# Patient Record
Sex: MALE | Race: WHITE | NOT HISPANIC OR LATINO | URBAN - NONMETROPOLITAN AREA
[De-identification: names, ages, dates, MRNs, and addresses within clinical notes are randomized per-mention and may not be internally consistent; named-entity substitution may affect disease eponyms.]

---

## 2017-10-12 NOTE — PATIENT DISCUSSION
(H40.013) Open angle with borderline findings, low risk, bilateral - Assesment : Examination revealed suspicion for Open Angle Glaucoma OU. Based on Increased C/D Iop within normal range - Plan : Monitor for changes. Advised patient to call our office when decreased vision or increased eye pain.

## 2017-10-12 NOTE — PATIENT DISCUSSION
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 Hard drusen OU - Plan : Patient advised to check [employer] regularly (once weekly or more) and use nutraceuticals such as AREDS 2 eye vitamins. Wear sunglasses when outdoors and eat green, leafy vegetables to maintain ocular health.

## 2017-10-12 NOTE — PATIENT DISCUSSION
(U90.552) Other secondary cataract, bilateral - Assesment : Significant posterior capsule opacification present. Patient is symptomatic with visual function affected. - Plan : Discussed that opacity is the cause of the decreased vision. Discussed the risks and benefits of performing a YAG Capsulotomy including the risk of floaters, retinal detachment, and retinal swelling. Patient understands to expect floaters after the YAG capsulotomy procedures and understands that they may remain indefinitely.  Recommend that patient undergo a YAG Capsulotomy OU (Both Eyes)

## 2017-11-21 NOTE — PATIENT DISCUSSION
(P20.608) Other secondary cataract, bilateral - Assesment : s/p yag,open - Plan : Monitor for changes. Advised patient to call our office with decreased vision or an increase in symptoms.  Rtc 6 months dilation/octm for amd

## 2018-09-05 PROBLEM — H52.4: Noted: 2018-09-05

## 2018-09-05 PROBLEM — H25.13: Noted: 2019-07-12

## 2019-07-12 ENCOUNTER — IMPORTED ENCOUNTER (OUTPATIENT)
Dept: URBAN - NONMETROPOLITAN AREA CLINIC 1 | Facility: CLINIC | Age: 84
End: 2019-07-12

## 2019-07-12 PROCEDURE — 92014 COMPRE OPH EXAM EST PT 1/>: CPT

## 2019-07-12 PROCEDURE — 92015 DETERMINE REFRACTIVE STATE: CPT

## 2019-07-12 NOTE — PATIENT DISCUSSION
Presbyopia OUDiscussed refractive status in detail with patient. MR done today but do not recommend updating due to cataract progression. Continue to monitor. Cheryl OUDiscussed diagnosis with patient. Reviewed symptoms related to cataract progression. Discussed various treatment options with patient. Recommend cataract evaluation by Dr. Melinda Brown. Patient agrees with plan will schedule

## 2019-10-02 ENCOUNTER — IMPORTED ENCOUNTER (OUTPATIENT)
Dept: URBAN - NONMETROPOLITAN AREA CLINIC 1 | Facility: CLINIC | Age: 84
End: 2019-10-02

## 2019-10-02 PROBLEM — H25.13: Noted: 2019-10-02

## 2019-10-02 PROBLEM — H52.4: Noted: 2019-10-02

## 2019-10-02 PROCEDURE — 92014 COMPRE OPH EXAM EST PT 1/>: CPT

## 2019-10-02 NOTE — PATIENT DISCUSSION
Cataract(s)-Visually significant cataract OU .-Cataract(s) causing symptomatic impairment of visual function not correctable with a tolerable change in glasses or contact lenses lighting or non-operative means resulting in specific activity limitations and/or participation restrictions including but not limited to reading viewing television driving or meeting vocational or recreational needs. -Expectation is clearer vision and functional improvement in symptoms as well as reduced glare disability after cataract removal.-Order IOLMaster and OPD today. -Recommend LenSx OU/Toric OD/LRI OS   based on today's OPD testing and lifestyle questionnaire.-All questions were answered regarding surgery including pre and post-op medications appointments activity restrictions and anesthetic usage.-The risks benefits and alternatives and special risk factors for the patient were discussed in detail including but not limited to: bleeding infection retinal detachment vitreous loss problems with the implant and possible need for additional surgery.-Although rare the possibility of complete vision loss was discussed.-The possible need for glasses post-operatively was discussed.-Order medical clearance exam based on history of HBP-Patient elects to proceed with cataract surgery OS . Will schedule at patient's convenience and re-evaluate OD  in the future. **Doctor Recommendations**- Amblyopia OS discussed realistic visual outcome- Discussed Standard/Traditional surgery with patient - Discussed the need for glasses up close - Informed patient his right eye will always see better patient states understanding - Discussed LenSx surgery in detail with patient - Patient qualifies for Toric OD and LRI OS based on today's testing

## 2019-11-01 ENCOUNTER — IMPORTED ENCOUNTER (OUTPATIENT)
Dept: URBAN - NONMETROPOLITAN AREA CLINIC 1 | Facility: CLINIC | Age: 84
End: 2019-11-01

## 2019-11-01 PROBLEM — I10: Noted: 2019-11-01

## 2019-11-01 PROBLEM — E78.5: Noted: 2019-11-01

## 2019-11-01 PROBLEM — I25.2: Noted: 2019-11-01

## 2019-11-01 PROBLEM — Z01.818: Noted: 2019-11-01

## 2019-11-06 ENCOUNTER — IMPORTED ENCOUNTER (OUTPATIENT)
Dept: URBAN - NONMETROPOLITAN AREA CLINIC 1 | Facility: CLINIC | Age: 84
End: 2019-11-06

## 2019-11-06 PROBLEM — H25.11: Noted: 2019-11-06

## 2019-11-06 PROBLEM — Z98.42: Noted: 2019-11-06

## 2019-11-06 PROCEDURE — 99024 POSTOP FOLLOW-UP VISIT: CPT

## 2019-11-06 NOTE — PATIENT DISCUSSION
1 Day POV CE OS 11/5/19 LRI- Discussed diagnosis in detail with patient- Patient is stable and doing well- Wound intact- Continue all post op drops as directed- Continue to monitor- RTC 1 week POV with Dr. Theresa Cloud -Visually significant.-Cataract(s) causing symptomatic impairment of visual function not correctable with a tolerable change in glasses or contact lenses lighting or non-operative means resulting in specific activity limitations and/or participation restrictions including but not limited to reading viewing television driving or meeting vocational or recreational needs. -Expectation is clearer vision and reduced glare disability after cataract removal.-Refer to Dr Shawnee Gonzalez for cataract evaluation

## 2019-11-12 ENCOUNTER — IMPORTED ENCOUNTER (OUTPATIENT)
Dept: URBAN - NONMETROPOLITAN AREA CLINIC 1 | Facility: CLINIC | Age: 84
End: 2019-11-12

## 2019-11-12 PROBLEM — H25.11: Noted: 2019-11-12

## 2019-11-12 PROBLEM — Z98.42: Noted: 2019-11-12

## 2019-11-12 PROCEDURE — 92012 INTRM OPH EXAM EST PATIENT: CPT

## 2019-11-12 PROCEDURE — 99024 POSTOP FOLLOW-UP VISIT: CPT

## 2019-11-12 NOTE — PATIENT DISCUSSION
Cataract(s)-Visually significant cataract OD . -Cataract(s) causing symptomatic impairment of visual function not correctable with a tolerable change in glasses or contact lenses lighting or non-operative means resulting in specific activity limitations and/or participation restrictions including but not limited to reading viewing television driving or meeting vocational or recreational needs. -Expectation is clearer vision and functional improvement in symptoms as well as reduced glare disability after cataract removal.-Recommend Toric IOL based on previous OPD testing and lifestyle questionnaire.-All questions were answered regarding surgery including pre and post-op medications appointments activity restrictions and anesthetic usage.-The risks benefits and alternatives and special risk factors for the patient were discussed in detail including but not limited to: bleeding infection retinal detachment vitreous loss problems with the implant and possible need for additional surgery.-Although rare the possibility of complete vision loss was discussed.-The need for glasses post-operatively was discussed.-Patient elects to proceed with cataract surgery OD . Will schedule at patient's convenience. **Hx of Trauma OD**s/p PC IOL OS-Pt doing well at 1 week s/p PCIOL. -Continue post-op gtts according to instruction sheet.-Okay to resume usual activites and d/c eye shield.

## 2019-11-20 ENCOUNTER — IMPORTED ENCOUNTER (OUTPATIENT)
Dept: URBAN - NONMETROPOLITAN AREA CLINIC 1 | Facility: CLINIC | Age: 84
End: 2019-11-20

## 2019-11-20 PROBLEM — Z98.42: Noted: 2019-11-12

## 2019-11-20 PROBLEM — Z98.41: Noted: 2019-11-20

## 2019-11-20 PROCEDURE — 99024 POSTOP FOLLOW-UP VISIT: CPT

## 2019-11-20 NOTE — PATIENT DISCUSSION
1 day POV s/p CE OD 11/19/2019  - TORIC / CE OS 11/05/2019 - LRI- Discussed diagnosis in detail with patient - Patient is stable and doing well- Wound intact - Continue all post op drops as directed - Continue to montior - RTC A/S POV

## 2019-12-09 ENCOUNTER — IMPORTED ENCOUNTER (OUTPATIENT)
Dept: URBAN - NONMETROPOLITAN AREA CLINIC 1 | Facility: CLINIC | Age: 84
End: 2019-12-09

## 2019-12-09 PROCEDURE — 99024 POSTOP FOLLOW-UP VISIT: CPT

## 2019-12-09 NOTE — PATIENT DISCUSSION
3 week POV s/p CE OD 11/19/2019  - TORIC / CE OS 11/05/2019 - LRI- Discussed diagnosis in detail with patient - Patient is stable and doing well- Wound intact - Finish all post op drops as directed - Continue to [facility] 3 month for follow up with [facility]

## 2020-03-09 ENCOUNTER — IMPORTED ENCOUNTER (OUTPATIENT)
Dept: URBAN - NONMETROPOLITAN AREA CLINIC 1 | Facility: CLINIC | Age: 85
End: 2020-03-09

## 2020-03-09 PROBLEM — H43.811: Noted: 2020-03-09

## 2020-03-09 PROBLEM — Z96.1: Noted: 2020-03-09

## 2020-03-09 PROCEDURE — 92012 INTRM OPH EXAM EST PATIENT: CPT

## 2020-03-09 NOTE — PATIENT DISCUSSION
P/C IOL OUDiscussed diagnosis in detail with patient. Both intraocular implants in place and stable. Continue to monitor. PVD ODDiscussed findings of exam in detail with the patient. The risk of retinal detachment in patients with PVDs was discussed with the patient and the warning signs of retinal detachment were carefully reviewed with the patient. The patient was warned to return to the office or contact the ophthalmologist on call immediately if they experience signs of retinal detachment. Continue to monitor. RTC in 1 year with [facility]

## 2021-05-25 ENCOUNTER — TELEPHONE (OUTPATIENT)
Dept: CARDIOLOGY CLINIC | Age: 86
End: 2021-05-25

## 2021-05-25 NOTE — TELEPHONE ENCOUNTER
Records received from [facility] in [place], [place].

## 2021-06-02 ENCOUNTER — OFFICE VISIT (OUTPATIENT)
Dept: FAMILY MEDICINE CLINIC | Age: 86
End: 2021-06-02
Payer: MEDICARE

## 2021-06-02 VITALS
WEIGHT: 204 LBS | BODY MASS INDEX: 28.56 KG/M2 | OXYGEN SATURATION: 96 % | HEART RATE: 72 BPM | DIASTOLIC BLOOD PRESSURE: 64 MMHG | TEMPERATURE: 98.4 F | SYSTOLIC BLOOD PRESSURE: 114 MMHG | HEIGHT: 71 IN

## 2021-06-02 DIAGNOSIS — C61 PROSTATE CA (HCC): ICD-10-CM

## 2021-06-02 DIAGNOSIS — I25.10 CORONARY ARTERY DISEASE INVOLVING NATIVE CORONARY ARTERY OF NATIVE HEART WITHOUT ANGINA PECTORIS: Primary | ICD-10-CM

## 2021-06-02 PROCEDURE — 99204 OFFICE O/P NEW MOD 45 MIN: CPT | Performed by: STUDENT IN AN ORGANIZED HEALTH CARE EDUCATION/TRAINING PROGRAM

## 2021-06-02 RX ORDER — NITROGLYCERIN 0.4 MG/1
0.4 TABLET SUBLINGUAL
COMMUNITY

## 2021-06-02 RX ORDER — PANTOPRAZOLE SODIUM 40 MG/1
40 TABLET, DELAYED RELEASE ORAL DAILY
COMMUNITY
End: 2021-06-11 | Stop reason: SDUPTHER

## 2021-06-02 RX ORDER — METOPROLOL TARTRATE 100 MG/1
100 TABLET ORAL 2 TIMES DAILY
COMMUNITY
End: 2022-01-12

## 2021-06-02 RX ORDER — LISINOPRIL 10 MG/1
10 TABLET ORAL DAILY
COMMUNITY
End: 2022-03-02 | Stop reason: SDUPTHER

## 2021-06-02 RX ORDER — ASPIRIN 81 MG/1
81 TABLET ORAL EVERY OTHER DAY
COMMUNITY

## 2021-06-02 RX ORDER — ATORVASTATIN CALCIUM 20 MG/1
20 TABLET, FILM COATED ORAL DAILY
COMMUNITY
End: 2021-07-28 | Stop reason: SDUPTHER

## 2021-06-02 RX ORDER — LANOLIN ALCOHOL/MO/W.PET/CERES
400 CREAM (GRAM) TOPICAL DAILY
COMMUNITY

## 2021-06-02 RX ORDER — AMLODIPINE BESYLATE 10 MG/1
10 TABLET ORAL DAILY
COMMUNITY
End: 2021-06-11 | Stop reason: SDUPTHER

## 2021-06-02 NOTE — PATIENT INSTRUCTIONS
Request records from previous providers. Let new urologist know to forward records to primary care provider. Learning About Coronary Artery Disease (CAD) What is coronary artery disease? Coronary artery disease is a condition that occurs when plaque builds up in the arteries that bring oxygen-rich blood to your heart. Plaque is a fatty substance made of cholesterol, calcium, and other substances in the blood. This process is called hardening of the arteries, or atherosclerosis. What happens when you have coronary artery disease? · Plaque may narrow the coronary arteries. Narrowed arteries cause poor blood flow. This can lead to angina symptoms such as chest pain or discomfort. If blood flow is completely blocked, you could have a heart attack. · You can slow and reduce the risk of future problems by making changes in your lifestyle. These include quitting smoking and eating heart-healthy foods. · Treatment, along with changes in your lifestyle, can help you live a longer and healthier life. How can you prevent coronary artery disease? · Do not smoke. It may be the best thing you can do to prevent coronary artery disease. If you need help quitting, talk to your doctor about stop-smoking programs and medicines. These can increase your chances of quitting for good. · Be active. Try to do moderate activity at least 2½ hours a week. Or try to do vigorous activity at least 1¼ hours a week. You may want to walk or try other activities, such as running, swimming, cycling, or playing tennis or team sports. · Eat heart-healthy foods. Eat more fruits and vegetables and less food that contains saturated and trans fats. Limit alcohol, sodium, and sweets. · Stay at a healthy weight. Lose weight if you need to. · Manage other health problems such as diabetes, high blood pressure, and high cholesterol. How is coronary artery disease treated? · Your doctor will suggest that you make lifestyle changes.  For example, your doctor may ask you to eat healthy foods, quit smoking, lose extra weight, and be more active. · You will take medicines that help prevent a heart attack. · Your doctor may suggest a procedure to open narrowed or blocked arteries. This is called angioplasty. Or your doctor may suggest using healthy blood vessels to create detours around narrowed or blocked arteries. This is called bypass surgery. Follow-up care is a key part of your treatment and safety. Be sure to make and go to all appointments, and call your doctor if you are having problems. It's also a good idea to know your test results and keep a list of the medicines you take. Where can you learn more? Go to http://www.gray.com/ Enter (37) [phone] in the search box to learn more about \"Learning About Coronary Artery Disease (CAD). \" Current as of: August 31, 2020               Content Version: 12.8 © 8315-3963 [employer]. Care instructions adapted under license by [employer] (which disclaims liability or warranty for this information). If you have questions about a medical condition or this instruction, always ask your healthcare professional. [place] [address] any warranty or liability for your use of this information.

## 2021-06-02 NOTE — ASSESSMENT & PLAN NOTE
PSA elevated to 12.4, biopsied and 3/13 demonstrated \"something\". Has a follow-up with Urology in [place] area 7/2021.

## 2021-06-02 NOTE — PROGRESS NOTES
Faith Benito is a 81 y.o. male , id x 2(name and ). Reviewed record, history, and  medications. Chief Complaint   Patient presents with   [address]     new to the area, recent fall at home, had surgery, fell after surgery and had additional injury        Vitals:    21 1404   BP: 114/64   Pulse: (!) 45   Temp: 98.4 °F (36.9 °C)   TempSrc: Oral   SpO2: 96%   Weight: 204 lb (92.5 kg)   Height: 5' 11\" (1.803 m)       Coordination of Care Questionnaire:   1) Have you been to an emergency room, urgent care, or hospitalized since your last visit? yes       2. Have seen or consulted any other health care provider since your last visit? YES      3 most recent PHQ Screens 2021   Little interest or pleasure in doing things Not at all   Feeling down, depressed, irritable, or hopeless Several days   Total Score PHQ 2 1       Patient is accompanied by self I have received verbal consent from Faith Benito to discuss any/all medical information while they are present in the room.

## 2021-06-02 NOTE — PROGRESS NOTES
Progress Note    he is a 81y.o. year old male who presents for evalution. Chief Complaint   Patient presents with   [address]     new to the area, recent fall at home, had surgery, fell after surgery and had additional injury          Assessment/ Plan:   Diagnoses and all orders for this visit:    1. Coronary artery disease involving native coronary artery of native heart without angina pectoris  Assessment & Plan:  Plans to establish with Dr. Marita Marquez  Remote history of MI  No current symptoms. 2. Prostate CA [facility])  Assessment & Plan:   PSA elevated to 12.4, biopsied and 3/13 demonstrated \"something\". Has a follow-up with Urology in [facility] 7/2021. Follow-up and Dispositions    · Return in about 3 months (around 9/2/2021) for follow-up, fasting labs. I have discussed the diagnosis with the patient and the intended plan as seen in the above orders. The patient has received an after-visit summary and questions were answered concerning future plans. Pt conveyed understanding of plan. Medication Side Effects and Warnings were discussed with patient        Subjective:     Chief Complaint   Patient presents with   [address]     new to the area, recent fall at home, had surgery, fell after surgery and had additional injury      Moved to the area to be near family  Follow-up tomorrow with ortho. Hoping to have brace removed. Recently lost wife on mother's day. Heart problems 2013. Reports he had a \"funny feeling\", chest pain, took asa, and called 911. Found to have an MI and went to [place] where he had a stent  No other heart attacks. Reports no lasting damage to the heart. Last stress test normal, a couple years ago  Echo a couple months ago which was fine. No current concerns for his heart. Reviewed PmHx, RxHx, FmHx, SocHx, AllgHx and updated and dated in the chart.     Review of Systems - negative except as listed above in the HPI    Objective: Vitals:    06/02/21 1404   BP: 114/64   Pulse: 72   Temp: 98.4 °F (36.9 °C)   TempSrc: Oral   SpO2: 96%   Weight: 204 lb (92.5 kg)   Height: 5' 11\" (1.803 m)       Current Outpatient Medications   Medication Sig    atorvastatin (LIPITOR) 20 mg tablet Take 20 mg by mouth daily.  pantoprazole (PROTONIX) 40 mg tablet Take 40 mg by mouth daily.  amLODIPine (NORVASC) 10 mg tablet Take 10 mg by mouth daily.  lisinopriL (PRINIVIL, ZESTRIL) 10 mg tablet Take 10 mg by mouth daily.  metoprolol tartrate (LOPRESSOR) 100 mg IR tablet Take 100 mg by mouth two (2) times a day.  aspirin delayed-release 81 mg tablet Take 81 mg by mouth daily.  cetirizine HCl (ALLER-ABRAN PO) Take  by mouth.  nitroglycerin (NITROSTAT) 0.4 mg SL tablet 0.4 mg by SubLINGual route every five (5) minutes as needed for Chest Pain. Up to 3 doses.  docosahexaenoic acid/epa (FISH OIL PO) Take 300 mg by mouth two (2) times a day.  magnesium oxide (MAG-OX) 400 mg tablet Take 400 mg by mouth daily. No current facility-administered medications for this visit. Physical Exam  Vitals and nursing note reviewed. Constitutional:       General: He is not in acute distress. Appearance: Normal appearance. He is not ill-appearing, toxic-appearing or diaphoretic. HENT:      Head: Normocephalic and atraumatic. Eyes:      General: No scleral icterus. Right eye: No discharge. Left eye: No discharge. Conjunctiva/sclera: Conjunctivae normal.   Cardiovascular:      Rate and Rhythm: Normal rate and regular rhythm. Pulses: Normal pulses. Pulmonary:      Effort: Pulmonary effort is normal. No respiratory distress. Breath sounds: Normal breath sounds. Musculoskeletal:      Cervical back: No rigidity. Right lower leg: No edema. Left lower leg: No edema. Skin:     General: Skin is warm and dry. Neurological:      General: No focal deficit present. Mental Status: He is alert. Bradly Hernandez MD

## 2021-06-10 ENCOUNTER — TELEPHONE (OUTPATIENT)
Dept: FAMILY MEDICINE CLINIC | Age: 86
End: 2021-06-10

## 2021-06-10 DIAGNOSIS — K21.9 GASTROESOPHAGEAL REFLUX DISEASE, UNSPECIFIED WHETHER ESOPHAGITIS PRESENT: Primary | ICD-10-CM

## 2021-06-10 DIAGNOSIS — I10 ESSENTIAL HYPERTENSION: ICD-10-CM

## 2021-06-10 NOTE — TELEPHONE ENCOUNTER
Patient came into office and requested that the following medications be submitted to [facility] mail order      Amlodipine, 10 mg  Pantoprazole 40 mg

## 2021-06-11 ENCOUNTER — DOCUMENTATION ONLY (OUTPATIENT)
Dept: FAMILY MEDICINE CLINIC | Age: 86
End: 2021-06-11

## 2021-06-11 RX ORDER — AMLODIPINE BESYLATE 10 MG/1
10 TABLET ORAL DAILY
Qty: 90 TABLET | Refills: 3 | Status: SHIPPED | OUTPATIENT
Start: 2021-06-11 | End: 2022-05-23 | Stop reason: SDUPTHER

## 2021-06-11 RX ORDER — PANTOPRAZOLE SODIUM 40 MG/1
40 TABLET, DELAYED RELEASE ORAL DAILY
Qty: 90 TABLET | Refills: 3 | Status: SHIPPED | OUTPATIENT
Start: 2021-06-11 | End: 2022-06-23 | Stop reason: SDUPTHER

## 2021-06-11 NOTE — TELEPHONE ENCOUNTER
Please call patient and let him know that his medications have been sent to [address].     If he needs any local rx sent so there is no lapse in treatment, send back to me

## 2021-06-14 NOTE — TELEPHONE ENCOUNTER
Pt notified medications requested have been sent to THE [facility]. No need to send to local pharmacy.

## 2021-06-24 ENCOUNTER — DOCUMENTATION ONLY (OUTPATIENT)
Dept: FAMILY MEDICINE CLINIC | Age: 86
End: 2021-06-24

## 2021-06-28 ENCOUNTER — OFFICE VISIT (OUTPATIENT)
Dept: CARDIOLOGY CLINIC | Age: 86
End: 2021-06-28
Payer: MEDICARE

## 2021-06-28 VITALS
SYSTOLIC BLOOD PRESSURE: 118 MMHG | HEART RATE: 68 BPM | DIASTOLIC BLOOD PRESSURE: 72 MMHG | WEIGHT: 206 LBS | BODY MASS INDEX: 28.84 KG/M2 | HEIGHT: 71 IN | OXYGEN SATURATION: 97 %

## 2021-06-28 DIAGNOSIS — I10 ESSENTIAL HYPERTENSION: ICD-10-CM

## 2021-06-28 DIAGNOSIS — E78.5 DYSLIPIDEMIA: ICD-10-CM

## 2021-06-28 DIAGNOSIS — I25.10 CORONARY ARTERY DISEASE INVOLVING NATIVE CORONARY ARTERY OF NATIVE HEART WITHOUT ANGINA PECTORIS: Primary | ICD-10-CM

## 2021-06-28 PROCEDURE — G8754 DIAS BP LESS 90: HCPCS | Performed by: SPECIALIST

## 2021-06-28 PROCEDURE — G8427 DOCREV CUR MEDS BY ELIG CLIN: HCPCS | Performed by: SPECIALIST

## 2021-06-28 PROCEDURE — G8536 NO DOC ELDER MAL SCRN: HCPCS | Performed by: SPECIALIST

## 2021-06-28 PROCEDURE — 99204 OFFICE O/P NEW MOD 45 MIN: CPT | Performed by: SPECIALIST

## 2021-06-28 PROCEDURE — 1101F PT FALLS ASSESS-DOCD LE1/YR: CPT | Performed by: SPECIALIST

## 2021-06-28 PROCEDURE — G8419 CALC BMI OUT NRM PARAM NOF/U: HCPCS | Performed by: SPECIALIST

## 2021-06-28 PROCEDURE — 93000 ELECTROCARDIOGRAM COMPLETE: CPT | Performed by: SPECIALIST

## 2021-06-28 PROCEDURE — G8510 SCR DEP NEG, NO PLAN REQD: HCPCS | Performed by: SPECIALIST

## 2021-06-28 PROCEDURE — G8752 SYS BP LESS 140: HCPCS | Performed by: SPECIALIST

## 2021-06-28 NOTE — PROGRESS NOTES
Yeimy Shaikh MD. [facility]              Patient: Yaima Valdivia  : 1935      Today's Date: 2021          HISTORY OF PRESENT ILLNESS:     History of Present Illness:  Here to establish care since moving back to [place]. Had CABG in . Has done OK since then. No problems with chest pain. Breathing OK. Uses a cane to walk. PAST MEDICAL HISTORY:     Past Medical History:   Diagnosis Date    CAD (coronary artery disease)     NSTEMI ---> Cath  - LAD 99% ---> ROQUE to LAD     Dyslipidemia     Factor 5 Leiden mutation, heterozygous ([address]     H/O carpal tunnel repair     HTN (hypertension)     Prostate CA ([address]     radiation treatments          Past Surgical History:   Procedure Laterality Date    HX CORONARY STENT PLACEMENT      HX OPEN REDUCTION INTERNAL FIXATION Left 2021    left patella    CO CARDIAC SURG PROCEDURE UNLIST             MEDICATIONS:     Current Outpatient Medications   Medication Sig Dispense Refill    pantoprazole (PROTONIX) 40 mg tablet Take 1 Tablet by mouth daily. 90 Tablet 3    amLODIPine (NORVASC) 10 mg tablet Take 1 Tablet by mouth daily. 90 Tablet 3    atorvastatin (LIPITOR) 20 mg tablet Take 20 mg by mouth daily.  lisinopriL (PRINIVIL, ZESTRIL) 10 mg tablet Take 10 mg by mouth daily.  metoprolol tartrate (LOPRESSOR) 100 mg IR tablet Take 100 mg by mouth two (2) times a day.  aspirin delayed-release 81 mg tablet Take 81 mg by mouth daily.  cetirizine HCl (ALLER-ABRAN PO) Take  by mouth.  nitroglycerin (NITROSTAT) 0.4 mg SL tablet 0.4 mg by SubLINGual route every five (5) minutes as needed for Chest Pain. Up to 3 doses.  docosahexaenoic acid/epa (FISH OIL PO) Take 300 mg by mouth two (2) times a day.  magnesium oxide (MAG-OX) 400 mg tablet Take 400 mg by mouth daily.          No Known Allergies          SOCIAL HISTORY:     Social History     Tobacco Use    Smoking status: Never Smoker    Smokeless tobacco: Never Used   Substance Use Topics    Alcohol use: Not Currently    Drug use: Never         FAMILY HISTORY:     Family History   Problem Relation Age of Onset    Cancer Mother     Heart Disease Father            REVIEW OF SYMPTOMS:     Review of Symptoms:  Constitutional: Negative for fever, chills  HEENT: Negative for nosebleeds, tinnitus, and vision changes. Respiratory: Negative for cough, wheezing  Cardiovascular: Negative for orthopnea, claudication, syncope, and PND. Gastrointestinal: Negative for abdominal pain, diarrhea, melena. Genitourinary: Negative for dysuria  Musculoskeletal: Negative for myalgias. Skin: Negative for rash  Heme: No problems bleeding. Neurological: Negative for speech change and focal weakness. PHYSICAL EXAM:     Physical Exam:  Visit Vitals  /72 (BP 1 Location: Left upper arm, BP Patient Position: Sitting)   Pulse 68   Ht 5' 11\" (1.803 m)   Wt 206 lb (93.4 kg)   SpO2 97%   BMI 28.73 kg/m²     Patient appears generally well, mood and affect are appropriate and pleasant. HEENT:  Hearing intact, non-icteric, normocephalic, atraumatic. Neck Exam: Supple, No JVD or carotid bruits. Lung Exam: Clear to auscultation, even breath sounds. Cardiac Exam: Regular rate and rhythm with 2/6 systolic murmur   Abdomen: Soft, non-tender, normal bowel sounds. Obese   Extremities: Moves all ext well. Trace bilat lower extremity edema. MSKTL: Overall good ROM ext  Skin: No significant rashes  Psych: Appropriate affect  Neuro - Grossly intact        LABS / OTHER STUDIES reviewed:       No results found for: NA, K, CL, CO2, AGAP, GLU, BUN, CREA, BUCR, GFRAA, GFRNA, CA, TBIL, TBILI, AP, TP, ALB, GLOB, AGRAT, ALT, AST            CARDIAC DIAGNOSTICS:     Cardiac Evaluation Includes:  I reviewed the results below.      EKG 6/28/21 - NSR, multifocal PVC's, RBBB, LAFB      ASSESSMENT AND PLAN:     Assessment and Plan:    1) CAD   - He had PCI to LAD in 2013 after NSTEMI   - he is doing well lately and he denies angina  - get prior records to review   - continue cardiac meds as above (asa, statin, BB, ACE-I)  - Check an echo next visit    2) HTN  - BP OK - cont meds which he is tolerating     3) Dyslipidemia  - cont statin   - check labs     4) EKG 6/28/21 - NSR, multifocal PVC's, RBBB, LAFB  - will get prior records to review (he says an echo was done in past 6 months)   - Check echo next visit     5) See me back in 6 months. Wife passed in May 2021 (ESRD). 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 Jocy Bernardo MD, [place] [id number] [facility], [place] [address].  [address], [address] [place] [address]  Ph: [phone]   Ph [phone]      ADDENDUM   7/12/2021  Records reviewed     NSTEMI ---> Cath 6/13 - LAD 99% ---> ROQUE to LAD   Carotids 1/19 - < 50% ICA stenosis bilat   Echo 1/19 - LVEF 60-65%, mild AS   Exercise Cardiolite 2/19 - 7 METS, no ischemia, nml EF

## 2021-06-28 NOTE — PROGRESS NOTES
Room     Moved from [place] to [facility]    CAD-MI  CHOL  HTN  Irreg heart beat  Hx CABG 2013    Unable to review     Chest pain: no  Shortness of breath: no  Edema: no  Palpitations: no  Dizziness: no    New diagnosis/Surgeries: no    ER/Hospitalizations: no    Refills:

## 2021-07-13 LAB
ALBUMIN SERPL-MCNC: 4.7 G/DL (ref 3.6–4.6)
ALBUMIN/GLOB SERPL: 1.9 {RATIO} (ref 1.2–2.2)
ALP SERPL-CCNC: 84 IU/L (ref 48–121)
ALT SERPL-CCNC: 12 IU/L (ref 0–44)
AST SERPL-CCNC: 16 IU/L (ref 0–40)
BILIRUB SERPL-MCNC: 0.6 MG/DL (ref 0–1.2)
BUN SERPL-MCNC: 18 MG/DL (ref 8–27)
BUN/CREAT SERPL: 23 (ref 10–24)
CALCIUM SERPL-MCNC: 9.4 MG/DL (ref 8.6–10.2)
CHLORIDE SERPL-SCNC: 102 MMOL/L (ref 96–106)
CHOLEST SERPL-MCNC: 148 MG/DL (ref 100–199)
CO2 SERPL-SCNC: 26 MMOL/L (ref 20–29)
CREAT SERPL-MCNC: 0.79 MG/DL (ref 0.76–1.27)
ERYTHROCYTE [DISTWIDTH] IN BLOOD BY AUTOMATED COUNT: 12.3 % (ref 11.6–15.4)
GLOBULIN SER CALC-MCNC: 2.5 G/DL (ref 1.5–4.5)
GLUCOSE SERPL-MCNC: 118 MG/DL (ref 65–99)
HCT VFR BLD AUTO: 44.3 % (ref 37.5–51)
HDLC SERPL-MCNC: 57 MG/DL
HGB BLD-MCNC: 14.6 G/DL (ref 13–17.7)
LDLC SERPL CALC-MCNC: 72 MG/DL (ref 0–99)
MCH RBC QN AUTO: 28.9 PG (ref 26.6–33)
MCHC RBC AUTO-ENTMCNC: 33 G/DL (ref 31.5–35.7)
MCV RBC AUTO: 88 FL (ref 79–97)
PLATELET # BLD AUTO: 195 X10E3/UL (ref 150–450)
POTASSIUM SERPL-SCNC: 4.6 MMOL/L (ref 3.5–5.2)
PROT SERPL-MCNC: 7.2 G/DL (ref 6–8.5)
RBC # BLD AUTO: 5.05 X10E6/UL (ref 4.14–5.8)
SODIUM SERPL-SCNC: 139 MMOL/L (ref 134–144)
TRIGL SERPL-MCNC: 105 MG/DL (ref 0–149)
TSH SERPL DL<=0.005 MIU/L-ACNC: 0.63 UIU/ML (ref 0.45–4.5)
VLDLC SERPL CALC-MCNC: 19 MG/DL (ref 5–40)
WBC # BLD AUTO: 5.1 X10E3/UL (ref 3.4–10.8)

## 2021-07-26 PROBLEM — N40.0 BPH (BENIGN PROSTATIC HYPERPLASIA): Status: ACTIVE | Noted: 2021-07-26

## 2021-09-01 ENCOUNTER — OFFICE VISIT (OUTPATIENT)
Dept: FAMILY MEDICINE CLINIC | Age: 86
End: 2021-09-01
Payer: MEDICARE

## 2021-09-01 VITALS
TEMPERATURE: 97.9 F | SYSTOLIC BLOOD PRESSURE: 131 MMHG | WEIGHT: 211.5 LBS | HEIGHT: 71 IN | BODY MASS INDEX: 29.61 KG/M2 | DIASTOLIC BLOOD PRESSURE: 68 MMHG | OXYGEN SATURATION: 98 % | HEART RATE: 62 BPM

## 2021-09-01 DIAGNOSIS — I10 ESSENTIAL HYPERTENSION: ICD-10-CM

## 2021-09-01 DIAGNOSIS — R73.01 ELEVATED FASTING GLUCOSE: Primary | ICD-10-CM

## 2021-09-01 DIAGNOSIS — C61 PROSTATE CA (HCC): ICD-10-CM

## 2021-09-01 DIAGNOSIS — E78.5 DYSLIPIDEMIA: ICD-10-CM

## 2021-09-01 DIAGNOSIS — F43.21 GRIEVING: ICD-10-CM

## 2021-09-01 DIAGNOSIS — I25.10 CORONARY ARTERY DISEASE INVOLVING NATIVE CORONARY ARTERY OF NATIVE HEART WITHOUT ANGINA PECTORIS: ICD-10-CM

## 2021-09-01 PROCEDURE — G8754 DIAS BP LESS 90: HCPCS | Performed by: STUDENT IN AN ORGANIZED HEALTH CARE EDUCATION/TRAINING PROGRAM

## 2021-09-01 PROCEDURE — 99214 OFFICE O/P EST MOD 30 MIN: CPT | Performed by: STUDENT IN AN ORGANIZED HEALTH CARE EDUCATION/TRAINING PROGRAM

## 2021-09-01 PROCEDURE — G8419 CALC BMI OUT NRM PARAM NOF/U: HCPCS | Performed by: STUDENT IN AN ORGANIZED HEALTH CARE EDUCATION/TRAINING PROGRAM

## 2021-09-01 PROCEDURE — G8427 DOCREV CUR MEDS BY ELIG CLIN: HCPCS | Performed by: STUDENT IN AN ORGANIZED HEALTH CARE EDUCATION/TRAINING PROGRAM

## 2021-09-01 PROCEDURE — 1101F PT FALLS ASSESS-DOCD LE1/YR: CPT | Performed by: STUDENT IN AN ORGANIZED HEALTH CARE EDUCATION/TRAINING PROGRAM

## 2021-09-01 PROCEDURE — G8536 NO DOC ELDER MAL SCRN: HCPCS | Performed by: STUDENT IN AN ORGANIZED HEALTH CARE EDUCATION/TRAINING PROGRAM

## 2021-09-01 PROCEDURE — G8510 SCR DEP NEG, NO PLAN REQD: HCPCS | Performed by: STUDENT IN AN ORGANIZED HEALTH CARE EDUCATION/TRAINING PROGRAM

## 2021-09-01 PROCEDURE — G8752 SYS BP LESS 140: HCPCS | Performed by: STUDENT IN AN ORGANIZED HEALTH CARE EDUCATION/TRAINING PROGRAM

## 2021-09-01 NOTE — PATIENT INSTRUCTIONS
Portions are an important thing to pay attention to in regards to diet and weight gain. Find ways to keep busy and engaged with people. Take time for exercise daily, try new things to keep your body moving. Eating healthy and staying well hydrated are important for physical and mental health. Let Dr. Marvin Moon know if you feel that sadness stays all the time or if you feel it is impacting your ability to enjoy activities. Grief (Actual/Anticipated): Care Instructions  Your Care Instructions   Grief is your emotional reaction to a major loss. The words \"sorrow\" and \"heartache\" often are used to describe feelings of grief. You feel grief when you lose a beloved person, pet, place, or thing. It is also natural to feel grief when you lose a valued way of life, such as a job, marriage, or good health. You may begin to grieve before a loss occurs. You may grieve for a loved one who is sick and dying. Children and adults often feel the pain of loss before a big move or divorce. This type of grief helps you get ready for a loss. Grief is different for each person. There is no \"normal\" or \"expected\" period of time for grieving. Some people adjust to their loss within a couple of months. Others may take 2 years or longer, especially if their lives were changed a lot or if the loss was sudden and shocking. Grieving can cause problems such as headaches, loss of appetite, and trouble with thinking or sleeping. You may withdraw from friends and family and behave in ways that are unusual for you. Grief may cause you to question your beliefs and views about life. Grief is natural and does not require medical treatment. But if you have trouble sleeping, it may help to take sleeping pills for a short time. It may help to talk with people who have been through or are going through similar losses. You may also want to talk to a counselor about your feelings.  Talking about your loss, sharing your cares and concerns, and getting support from others are important parts of healthy grieving. Follow-up care is a key part of your treatment and safety. Be sure to make and go to all appointments, and call your doctor if you are having problems. It's also a good idea to know your test results and keep a list of the medicines you take. How can you care for yourself at home? · Get enough sleep. Your mind helps make sense of your life while you sleep. Missing sleep can lead to illness and make it harder for you to deal with your grief. · Eat healthy foods. Try to avoid eating only foods that give you comfort. Ask someone to join you for a meal if you do not like eating alone. Consider taking a multivitamin every day. · Get some exercise every day. Even a walk can help you deal with your grief. Other exercises, such as yoga, can also help you manage stress. · Comfort yourself. Take time to look at photos or use special items that make you feel better. · Stay involved in your life. Do not withdraw from the activities you enjoy. People you know at work, [employer], clubs, or other groups can help you get through your period of grief. · Think about joining a support group to help you deal with your grief. There are many support groups to help people recover from grief. When should you call for help? Call 911 anytime you think you may need emergency care. For example, call if:    · You feel you cannot stop from hurting yourself or someone else. Watch closely for changes in your health, and be sure to contact your doctor if:    · You think you may be depressed.     · You do not get better as expected. Where can you learn more? Go to http://www.gray.com/  Enter H249 in the search box to learn more about \"Grief (Actual/Anticipated): Care Instructions. \"  Current as of: July 17, 2020               Content Version: 12.8  © 0763-1085 Healthwise, Incorporated.    Care instructions adapted under license by [employer] (which disclaims liability or warranty for this information). If you have questions about a medical condition or this instruction, always ask your healthcare professional. [address] any warranty or liability for your use of this information.

## 2021-09-01 NOTE — ASSESSMENT & PLAN NOTE
No history of diabetes. Previously has been told that blood sugar was mildly elevated.   Assess further at this time with A1c  Discussed healthy diet and activity to prevent developing diabetes and target weight gain

## 2021-09-01 NOTE — ASSESSMENT & PLAN NOTE
well controlled, continue current medications  Lab Results   Component Value Date/Time    LDL, calculated 72 07/12/2021 08:10 AM

## 2021-09-01 NOTE — PROGRESS NOTES
Noble Cabezas is a 81 y.o. male , id x 2(name and ). Reviewed record, history, and  medications. Chief Complaint   Patient presents with    Labs     3 month f/up , had cantalope and trailmix bar, and a cup of coffee. Vitals:    21 0836   BP: 131/68   Pulse: 62   Temp: 97.9 °F (36.6 °C)   SpO2: 98%   Weight: 211 lb 8 oz (95.9 kg)   Height: 5' 11\" (1.803 m)       Coordination of Care Questionnaire:   1) Have you been to an emergency room, urgent care, or hospitalized since your last visit?   no       2. Have seen or consulted any other health care provider since your last visit? NO      3 most recent PHQ Screens 2021   Little interest or pleasure in doing things Not at all   Feeling down, depressed, irritable, or hopeless Not at all   Total Score PHQ 2 0       Patient is accompanied by self I have received verbal consent from Noble Cabezas to discuss any/all medical information while they are present in the room.

## 2021-09-01 NOTE — ASSESSMENT & PLAN NOTE
Encouraged healthy lifestyle, maintaining relationships, staying active and busy. Discussed signs of normal grief and signs of depression. At this time, low concern for depression.

## 2021-09-01 NOTE — PROGRESS NOTES
Progress Note    he is a 81y.o. year old male who presents for evalution. Chief Complaint   Patient presents with    Labs     3 month f/up , had cantalope and trailmix bar, and a cup of coffee. Assessment/ Plan:   Diagnoses and all orders for this visit:    1. Elevated fasting glucose  Assessment & Plan:  No history of diabetes. Previously has been told that blood sugar was mildly elevated. Assess further at this time with A1c  Discussed healthy diet and activity to prevent developing diabetes and target weight gain    Orders:  -     HEMOGLOBIN A1C WITH EAG; Future    2. Grieving  Assessment & Plan:  Encouraged healthy lifestyle, maintaining relationships, staying active and busy. Discussed signs of normal grief and signs of depression. At this time, low concern for depression. 3. Essential hypertension  Assessment & Plan:   well controlled, continue current medications      4. Dyslipidemia  Assessment & Plan:   well controlled, continue current medications  Lab Results   Component Value Date/Time    LDL, calculated 72 07/12/2021 08:10 AM           5. Prostate CA [facility])  Assessment & Plan:   monitored by specialist. No acute findings meriting change in the plan      6. Coronary artery disease involving native coronary artery of native heart without angina pectoris  Assessment & Plan:   monitored by specialist. No acute findings meriting change in the plan  Followed by Dr. Heena Galeana, notes from recent office visit reviewed. Follow-up and Dispositions    · Return in about 6 months (around 3/1/2022) for follow-up blood pressure and labs; sooner for concerns with mood or continued weight gain. I have discussed the diagnosis with the patient and the intended plan as seen in the above orders. The patient has received an after-visit summary and questions were answered concerning future plans. Pt conveyed understanding of plan.     Medication Side Effects and Warnings were discussed with patient        Subjective:     Chief Complaint   Patient presents with    Labs     3 month f/up , had cantalope and trailmix bar, and a cup of coffee. Got a good report from urologist  PSA 12.4 to 2    Notes weight up to 227 lbs, lowest was 195  Sees weight going up again now. Diet: reports doesn't eat bad but does eat too much; ice cream is a favorite of his; eats 3 meals/day   Goes to his daughter's house a few nights/week for dinner   Not a fast food person, once every 1-2 weeks   TV dinner once a week. Exercise: active around the home  Was fasting for labs with Dr. Korin Dixon. Grieving due to wife's death in May  No persistent sadness or anhedonia. Keeps busy with projects, recently built a deck with son-in-law  Good family support, children call him frequently      Reviewed PmHx, RxHx, FmHx, SocHx, AllgHx and updated and dated in the chart. Review of Systems - negative except as listed above in the HPI    Objective:     Vitals:    09/01/21 0836   BP: 131/68   Pulse: 62   Temp: 97.9 °F (36.6 °C)   SpO2: 98%   Weight: 211 lb 8 oz (95.9 kg)   Height: 5' 11\" (1.803 m)       Current Outpatient Medications   Medication Sig    atorvastatin (LIPITOR) 20 mg tablet Take 1 Tablet by mouth daily.  pantoprazole (PROTONIX) 40 mg tablet Take 1 Tablet by mouth daily.  amLODIPine (NORVASC) 10 mg tablet Take 1 Tablet by mouth daily.  lisinopriL (PRINIVIL, ZESTRIL) 10 mg tablet Take 10 mg by mouth daily.  metoprolol tartrate (LOPRESSOR) 100 mg IR tablet Take 100 mg by mouth two (2) times a day.  aspirin delayed-release 81 mg tablet Take 81 mg by mouth daily.  cetirizine HCl (ALLER-ABRAN PO) Take  by mouth.  nitroglycerin (NITROSTAT) 0.4 mg SL tablet 0.4 mg by SubLINGual route every five (5) minutes as needed for Chest Pain. Up to 3 doses.  docosahexaenoic acid/epa (FISH OIL PO) Take 300 mg by mouth two (2) times a day.  magnesium oxide (MAG-OX) 400 mg tablet Take 400 mg by mouth daily. No current facility-administered medications for this visit. Physical Exam  Vitals and nursing note reviewed. Constitutional:       General: He is not in acute distress. Appearance: Normal appearance. He is not ill-appearing, toxic-appearing or diaphoretic. HENT:      Head: Normocephalic and atraumatic. Eyes:      General: No scleral icterus. Right eye: No discharge. Left eye: No discharge. Conjunctiva/sclera: Conjunctivae normal.   Cardiovascular:      Rate and Rhythm: Normal rate and regular rhythm. Pulses: Normal pulses. Pulmonary:      Effort: Pulmonary effort is normal. No respiratory distress. Breath sounds: Normal breath sounds. Musculoskeletal:      Cervical back: No rigidity. Right lower leg: No edema. Left lower leg: No edema. Skin:     General: Skin is warm and dry. Neurological:      General: No focal deficit present. Mental Status: He is alert. Psychiatric:         Mood and Affect: Mood normal. Affect is tearful (when discussing wife's death). Behavior: Behavior normal.         Thought Content:  Thought content normal.         Judgment: Judgment normal.              Alyssa Vega MD

## 2021-09-01 NOTE — ASSESSMENT & PLAN NOTE
monitored by specialist. No acute findings meriting change in the plan  Followed by Dr. Dolly Gonzales, notes from recent office visit reviewed.

## 2021-09-02 LAB
EST. AVERAGE GLUCOSE BLD GHB EST-MCNC: 123 MG/DL
HBA1C MFR BLD: 5.9 % (ref 4–5.6)

## 2021-09-06 NOTE — PROGRESS NOTES
May call or send letter. A1c is mildly elevated, in the prediabetic range. I recommend focusing on healthy diet and activity level to prevent developing diabetes. Reference diet handouts given at recent office visit.

## 2021-12-07 ENCOUNTER — OFFICE VISIT (OUTPATIENT)
Dept: FAMILY MEDICINE CLINIC | Age: 86
End: 2021-12-07
Payer: MEDICARE

## 2021-12-07 VITALS
HEIGHT: 71 IN | TEMPERATURE: 97.8 F | WEIGHT: 217.8 LBS | DIASTOLIC BLOOD PRESSURE: 78 MMHG | BODY MASS INDEX: 30.49 KG/M2 | SYSTOLIC BLOOD PRESSURE: 130 MMHG | HEART RATE: 65 BPM | OXYGEN SATURATION: 98 %

## 2021-12-07 DIAGNOSIS — S09.93XD BLUNT TRAUMA OF FACE, SUBSEQUENT ENCOUNTER: Primary | ICD-10-CM

## 2021-12-07 PROCEDURE — 99213 OFFICE O/P EST LOW 20 MIN: CPT | Performed by: STUDENT IN AN ORGANIZED HEALTH CARE EDUCATION/TRAINING PROGRAM

## 2021-12-07 NOTE — PATIENT INSTRUCTIONS
Monitor for signs of infection:  - redness spreading  - increasing swelling  - skin feeling hot  - pus drainage      Skin Tears: Care Instructions  Your Care Instructions  As we get older, our skin gets drier and more fragile. Sometimes this can cause the outer layers of skin to split and tear open. Skin tears are treated in different ways. In some cases, doctors use pieces of tape called Steri-Strips to pull the skin together and help it heal. Other times, it's best to leave the tear open and cover it with a special wound-care bandage. Skin tears are usually not serious. They usually heal in a few weeks. But how long you take to heal depends on your body and the type of tear you have. Sometimes the torn piece of skin is used to protect the wound while it heals. But that piece of skin does not heal. It may fall off on its own. Or the doctor may remove it. As your tear heals, it's important to keep it clean to help prevent infection. The doctor has checked you carefully, but problems can develop later. If you notice any problems or new symptoms, get medical treatment right away. Follow-up care is a key part of your treatment and safety. Be sure to make and go to all appointments, and call your doctor if you are having problems. It's also a good idea to know your test results and keep a list of the medicines you take. How can you care for yourself at home? · If you have pain, ask your doctor if you can take an over-the-counter pain medicine, such as acetaminophen (Tylenol), ibuprofen (Advil, Motrin), or naproxen (Aleve). Be safe with medicines. Read and follow all instructions on the label. · If you have a bandage, follow your doctor's instructions for changing it. · If you have Steri-Strips, leave them on until they fall off. · Follow your doctor's instructions about bathing. · Gently wash the skin tear with plain water 2 times a day. Do not rub the area. · Let the area air dry.  Or you can pat it carefully with a soft towel. When should you call for help? Call your doctor now or seek immediate medical care if:    · You have signs of infection, such as:  ? Increased pain, swelling, warmth, or redness around the tear. ? Red streaks leading from the tear. ? Pus draining from the tear. ? A fever.     · The tear starts to bleed a lot. Small amounts of blood are normal.   Watch closely for changes in your health, and be sure to contact your doctor if:    · You do not get better as expected. Where can you learn more? Go to http://www.gray.com/  Enter O444 in the search box to learn more about \"Skin Tears: Care Instructions. \"  Current as of: July 1, 2021               Content Version: 13.0  © 2006-2021 Healthwise, Incorporated. Care instructions adapted under license by [employer] (which disclaims liability or warranty for this information). If you have questions about a medical condition or this instruction, always ask your healthcare professional. [address] any warranty or liability for your use of this information.

## 2021-12-07 NOTE — PROGRESS NOTES
Progress Note    he is a 81y.o. year old male who presents for evalution. Assessment/ Plan:   Diagnoses and all orders for this visit:    1. Blunt trauma of face, subsequent encounter    All injuries appear to be healing well. Continue otc ointment to keep wounds moist.  Encouraged to monitor for signs of infection. Follow-up as previously scheduled      Follow-up and Dispositions    · Return if symptoms worsen or fail to improve. I have discussed the diagnosis with the patient and the intended plan as seen in the above orders. The patient has received an after-visit summary and questions were answered concerning future plans. Pt conveyed understanding of plan. Medication Side Effects and Warnings were discussed with patient        Subjective:     Chief Complaint   Patient presents with   [facility] Follow[facility]up     fell Saturday and hit his head on a slab of concrete. here for a wound check. Earlyne Whit Saturday, had a lot of bleeding through the night   No LOC, oral trauma  EMS came, same des that responded when his wife passed. [facility], no fracture   Given a tetanus shot   Been using polysporin on the abrasions. Been increasing activity and trying to watch diet      Reviewed PmHx, RxHx, FmHx, SocHx, AllgHx and updated and dated in the chart. Review of Systems - negative except as listed above in the HPI    Objective:     Vitals:    12/07/21 1001   BP: 130/78   Pulse: 65   Temp: 97.8 °F (36.6 °C)   SpO2: 98%   Weight: 217 lb 12.8 oz (98.8 kg)   Height: 5' 11\" (1.803 m)       Current Outpatient Medications   Medication Sig    atorvastatin (LIPITOR) 20 mg tablet Take 1 Tablet by mouth daily.  pantoprazole (PROTONIX) 40 mg tablet Take 1 Tablet by mouth daily.  amLODIPine (NORVASC) 10 mg tablet Take 1 Tablet by mouth daily.  lisinopriL (PRINIVIL, ZESTRIL) 10 mg tablet Take 10 mg by mouth daily.     metoprolol tartrate (LOPRESSOR) 100 mg IR tablet Take 100 mg by mouth two (2) times a day.  aspirin delayed-release 81 mg tablet Take 81 mg by mouth daily.  cetirizine HCl (ALLER-ABRAN PO) Take  by mouth.  nitroglycerin (NITROSTAT) 0.4 mg SL tablet 0.4 mg by SubLINGual route every five (5) minutes as needed for Chest Pain. Up to 3 doses.  docosahexaenoic acid/epa (FISH OIL PO) Take 300 mg by mouth two (2) times a day.  magnesium oxide (MAG-OX) 400 mg tablet Take 400 mg by mouth daily. No current facility-administered medications for this visit. Physical Exam  Vitals and nursing note reviewed. Constitutional:       General: He is not in acute distress. Appearance: Normal appearance. He is not ill-appearing, toxic-appearing or diaphoretic. HENT:      Head: Normocephalic and atraumatic. Right Ear: External ear normal.      Left Ear: External ear normal.      Nose:      Comments: Swelling, overlying abrasion     Mouth/Throat:      Mouth: Mucous membranes are moist.      Pharynx: Oropharynx is clear. No oropharyngeal exudate or posterior oropharyngeal erythema. Eyes:      General: No scleral icterus. Right eye: No discharge. Left eye: No discharge. Extraocular Movements: Extraocular movements intact. Conjunctiva/sclera: Conjunctivae normal.   Pulmonary:      Effort: Pulmonary effort is normal. No respiratory distress. Musculoskeletal:      Cervical back: No rigidity. Skin:     General: Skin is warm and dry. Findings: Bruising (R periorbital and overlying R maxilla) present. Comments: Skin tears and swelling over R eyebrow   Neurological:      General: No focal deficit present. Mental Status: He is alert. Mental status is at baseline. Cranial Nerves: No cranial nerve deficit. Motor: No weakness.       Gait: Gait normal.              Theresa Shaw MD

## 2021-12-07 NOTE — PROGRESS NOTES
Duke Franklin is a 81 y.o. male , id x 2(name and ). Reviewed record, history, and  medications. Chief Complaint   Patient presents with   [facility] ED Follow-up     fell Saturday and hit his head on a slab of concrete. here for a wound check. Vitals:    21 1001   BP: 130/78   Pulse: 65   Temp: 97.8 °F (36.6 °C)   SpO2: 98%   Weight: 217 lb 12.8 oz (98.8 kg)   Height: 5' 11\" (1.803 m)       Coordination of Care Questionnaire:   1) Have you been to an emergency room, urgent care, or hospitalized since your last visit?   no       2. Have seen or consulted any other health care provider since your last visit? NO      3 most recent PHQ Screens 2021   Little interest or pleasure in doing things Not at all   Feeling down, depressed, irritable, or hopeless Not at all   Total Score PHQ 2 0       Patient is accompanied by self I have received verbal consent from Duke Franklin to discuss any/all medical information while they are present in the room.

## 2022-01-12 ENCOUNTER — ANCILLARY PROCEDURE (OUTPATIENT)
Dept: CARDIOLOGY CLINIC | Age: 87
End: 2022-01-12

## 2022-01-12 ENCOUNTER — OFFICE VISIT (OUTPATIENT)
Dept: CARDIOLOGY CLINIC | Age: 87
End: 2022-01-12
Payer: MEDICARE

## 2022-01-12 VITALS
BODY MASS INDEX: 31.22 KG/M2 | HEART RATE: 66 BPM | WEIGHT: 223 LBS | DIASTOLIC BLOOD PRESSURE: 66 MMHG | HEIGHT: 71 IN | SYSTOLIC BLOOD PRESSURE: 100 MMHG

## 2022-01-12 VITALS
BODY MASS INDEX: 31.22 KG/M2 | SYSTOLIC BLOOD PRESSURE: 118 MMHG | DIASTOLIC BLOOD PRESSURE: 62 MMHG | HEIGHT: 71 IN | WEIGHT: 223 LBS

## 2022-01-12 DIAGNOSIS — E78.5 DYSLIPIDEMIA: ICD-10-CM

## 2022-01-12 DIAGNOSIS — I25.10 CORONARY ARTERY DISEASE INVOLVING NATIVE CORONARY ARTERY OF NATIVE HEART WITHOUT ANGINA PECTORIS: ICD-10-CM

## 2022-01-12 DIAGNOSIS — I10 PRIMARY HYPERTENSION: ICD-10-CM

## 2022-01-12 DIAGNOSIS — I25.10 CORONARY ARTERY DISEASE INVOLVING NATIVE CORONARY ARTERY OF NATIVE HEART WITHOUT ANGINA PECTORIS: Primary | ICD-10-CM

## 2022-01-12 LAB
ECHO AO ASC DIAM: 3.9 CM
ECHO AO ASCENDING AORTA INDEX: 1.76 CM/M2
ECHO AO ROOT DIAM: 3.7 CM
ECHO AO ROOT INDEX: 1.67 CM/M2
ECHO AV AREA PEAK VELOCITY: 1.9 CM2
ECHO AV AREA PEAK VELOCITY: 1.9 CM2
ECHO AV AREA VTI: 2 CM2
ECHO AV AREA VTI: 2 CM2
ECHO AV MEAN GRADIENT: 7 MMHG
ECHO AV MEAN VELOCITY: 1.2 M/S
ECHO AV PEAK GRADIENT: 12 MMHG
ECHO AV PEAK VELOCITY: 1.7 M/S
ECHO AV VELOCITY RATIO: 0.47
ECHO AV VTI: 38.1 CM
ECHO EST RA PRESSURE: 3 MMHG
ECHO LA DIAMETER INDEX: 2.35 CM/M2
ECHO LA DIAMETER: 5.2 CM
ECHO LA TO AORTIC ROOT RATIO: 1.41
ECHO LA VOL 2C: 70 ML (ref 18–58)
ECHO LA VOL 4C: 78 ML (ref 18–58)
ECHO LA VOLUME AREA LENGTH: 83 ML
ECHO LA VOLUME INDEX A2C: 32 ML/M2 (ref 16–34)
ECHO LA VOLUME INDEX A4C: 35 ML/M2 (ref 16–34)
ECHO LA VOLUME INDEX AREA LENGTH: 38 ML/M2 (ref 16–34)
ECHO LV E' LATERAL VELOCITY: 8 CM/S
ECHO LV E' SEPTAL VELOCITY: 6 CM/S
ECHO LV EDV A2C: 127 ML
ECHO LV EDV A4C: 136 ML
ECHO LV EDV BP: 131 ML (ref 67–155)
ECHO LV EDV BP: 131 ML (ref 67–155)
ECHO LV EDV INDEX A4C: 62 ML/M2
ECHO LV EDV NDEX A2C: 57 ML/M2
ECHO LV EJECTION FRACTION A2C: 61 %
ECHO LV EJECTION FRACTION A4C: 60 %
ECHO LV EJECTION FRACTION BIPLANE: 59 % (ref 55–100)
ECHO LV ESV A2C: 50 ML
ECHO LV ESV A4C: 55 ML
ECHO LV ESV BP: 54 ML (ref 22–58)
ECHO LV ESV INDEX A2C: 23 ML/M2
ECHO LV ESV INDEX A4C: 25 ML/M2
ECHO LV ESV INDEX BP: 24 ML/M2
ECHO LV FRACTIONAL SHORTENING: 32 % (ref 28–44)
ECHO LV INTERNAL DIMENSION DIASTOLE INDEX: 2.58 CM/M2
ECHO LV INTERNAL DIMENSION DIASTOLIC: 5.7 CM (ref 4.2–5.9)
ECHO LV INTERNAL DIMENSION SYSTOLIC INDEX: 1.76 CM/M2
ECHO LV INTERNAL DIMENSION SYSTOLIC: 3.9 CM
ECHO LV IVSD: 1 CM (ref 0.6–1)
ECHO LV MASS 2D: 226.4 G (ref 88–224)
ECHO LV MASS INDEX 2D: 102.4 G/M2 (ref 49–115)
ECHO LV POSTERIOR WALL DIASTOLIC: 1 CM (ref 0.6–1)
ECHO LV RELATIVE WALL THICKNESS RATIO: 0.35
ECHO LVOT AREA: 4.2 CM2
ECHO LVOT AV VTI INDEX: 0.47
ECHO LVOT DIAM: 2.3 CM
ECHO LVOT MEAN GRADIENT: 1 MMHG
ECHO LVOT PEAK GRADIENT: 2 MMHG
ECHO LVOT PEAK VELOCITY: 0.8 M/S
ECHO LVOT STROKE VOLUME INDEX: 33.4 ML/M2
ECHO LVOT SV: 73.9 ML
ECHO LVOT VTI: 17.8 CM
ECHO MV A VELOCITY: 0.88 M/S
ECHO MV AREA PHT: 3.8 CM2
ECHO MV E DECELERATION TIME (DT): 197.9 MS
ECHO MV E VELOCITY: 0.74 M/S
ECHO MV E/A RATIO: 0.84
ECHO MV E/E' LATERAL: 9.25
ECHO MV E/E' RATIO (AVERAGED): 10.79
ECHO MV E/E' SEPTAL: 12.33
ECHO MV PRESSURE HALF TIME (PHT): 57.4 MS
ECHO RIGHT VENTRICULAR SYSTOLIC PRESSURE (RVSP): 35 MMHG
ECHO RV FREE WALL PEAK S': 12 CM/S
ECHO RV INTERNAL DIMENSION: 3.3 CM
ECHO RV TAPSE: 2.8 CM (ref 1.5–2)
ECHO TV REGURGITANT MAX VELOCITY: 2.84 M/S
ECHO TV REGURGITANT PEAK GRADIENT: 32 MMHG

## 2022-01-12 PROCEDURE — G8427 DOCREV CUR MEDS BY ELIG CLIN: HCPCS | Performed by: SPECIALIST

## 2022-01-12 PROCEDURE — G8754 DIAS BP LESS 90: HCPCS | Performed by: SPECIALIST

## 2022-01-12 PROCEDURE — 1101F PT FALLS ASSESS-DOCD LE1/YR: CPT | Performed by: SPECIALIST

## 2022-01-12 PROCEDURE — 93306 TTE W/DOPPLER COMPLETE: CPT | Performed by: SPECIALIST

## 2022-01-12 PROCEDURE — 99214 OFFICE O/P EST MOD 30 MIN: CPT | Performed by: SPECIALIST

## 2022-01-12 PROCEDURE — G8536 NO DOC ELDER MAL SCRN: HCPCS | Performed by: SPECIALIST

## 2022-01-12 PROCEDURE — G8752 SYS BP LESS 140: HCPCS | Performed by: SPECIALIST

## 2022-01-12 PROCEDURE — G8432 DEP SCR NOT DOC, RNG: HCPCS | Performed by: SPECIALIST

## 2022-01-12 PROCEDURE — G8417 CALC BMI ABV UP PARAM F/U: HCPCS | Performed by: SPECIALIST

## 2022-01-12 RX ORDER — METOPROLOL SUCCINATE 100 MG/1
100 TABLET, EXTENDED RELEASE ORAL DAILY
COMMUNITY
End: 2022-02-11 | Stop reason: SDUPTHER

## 2022-01-12 RX ORDER — MULTIVITAMIN
1000 TABLET ORAL 2 TIMES DAILY
COMMUNITY

## 2022-01-12 RX ORDER — TAMSULOSIN HYDROCHLORIDE 0.4 MG/1
0.4 CAPSULE ORAL DAILY
COMMUNITY

## 2022-01-12 NOTE — PROGRESS NOTES
Philip Timmons MD. [facility]              Patient: Ely Lopez  : 1935      Today's Date: 2022          HISTORY OF PRESENT ILLNESS:     History of Present Illness: Had CABG in . Has done OK since then. No problems with chest pain. Breathing OK. Uses a cane to walk. Lives alone since wife passed in . PAST MEDICAL HISTORY:     Past Medical History:   Diagnosis Date    CAD (coronary artery disease)     NSTEMI ---> Cath  - LAD 99% ---> ROQUE to LAD     Dyslipidemia     Factor 5 Leiden mutation, heterozygous ([facility]     H/O carpal tunnel repair     HTN (hypertension)     Prostate CA ([facility]     radiation treatments          Past Surgical History:   Procedure Laterality Date    HX CORONARY STENT PLACEMENT      HX OPEN REDUCTION INTERNAL FIXATION Left 2021    left patella    HI CARDIAC SURG PROCEDURE UNLIST             MEDICATIONS:     Current Outpatient Medications   Medication Sig Dispense Refill    tamsulosin (FLOMAX) 0.4 mg capsule Take 0.4 mg by mouth daily.  cpap machine kit by Does Not Apply route.  atorvastatin (LIPITOR) 20 mg tablet Take 1 Tablet by mouth daily. 90 Tablet 3    pantoprazole (PROTONIX) 40 mg tablet Take 1 Tablet by mouth daily. 90 Tablet 3    amLODIPine (NORVASC) 10 mg tablet Take 1 Tablet by mouth daily. 90 Tablet 3    lisinopriL (PRINIVIL, ZESTRIL) 10 mg tablet Take 10 mg by mouth daily.  aspirin delayed-release 81 mg tablet Take 81 mg by mouth every other day.  cetirizine HCl (ALLER-ABRAN PO) Take  by mouth.  nitroglycerin (NITROSTAT) 0.4 mg SL tablet 0.4 mg by SubLINGual route every five (5) minutes as needed for Chest Pain. Up to 3 doses.  docosahexaenoic acid/epa (FISH OIL PO) Take 300 mg by mouth two (2) times a day.  magnesium oxide (MAG-OX) 400 mg tablet Take 400 mg by mouth daily.  metoprolol tartrate (LOPRESSOR) 100 mg IR tablet Take 100 mg by mouth two (2) times a day.          No Known Allergies          SOCIAL HISTORY:     Social History     Tobacco Use    Smoking status: Never Smoker    Smokeless tobacco: Never Used   Substance Use Topics    Alcohol use: Not Currently    Drug use: Never         FAMILY HISTORY:     Family History   Problem Relation Age of Onset    Cancer Mother     Heart Disease Father            REVIEW OF SYMPTOMS:     Review of Symptoms:  Constitutional: Negative for fever, chills  HEENT: Negative for nosebleeds, tinnitus, and vision changes. Respiratory: Negative for cough, wheezing  Cardiovascular: Negative for orthopnea, claudication, syncope, and PND. Gastrointestinal: Negative for abdominal pain, diarrhea, melena. Genitourinary: Negative for dysuria  Musculoskeletal: Negative for myalgias. Skin: Negative for rash  Heme: No problems bleeding. Neurological: Negative for speech change and focal weakness. PHYSICAL EXAM:     Physical Exam:  Visit Vitals  /66   Pulse 66   Ht 5' 11\" (1.803 m)   Wt 223 lb (101.2 kg)   BMI 31.10 kg/m²     Patient appears generally well, mood and affect are appropriate and pleasant. HEENT:  Hearing intact, non-icteric, normocephalic, atraumatic. Neck Exam: Supple, No JVD or carotid bruits. Lung Exam: Clear to auscultation, even breath sounds. Cardiac Exam: Regular rate and rhythm with 2/6 systolic murmur   Abdomen: Soft, non-tender, normal bowel sounds. Obese   Extremities: Moves all ext well. Trace bilat lower extremity edema.   MSKTL: Overall good ROM ext  Skin: No significant rashes  Psych: Appropriate affect  Neuro - Grossly intact        LABS / OTHER STUDIES reviewed:       Lab Results   Component Value Date/Time    Sodium 139 07/12/2021 08:10 AM    Potassium 4.6 07/12/2021 08:10 AM    Chloride 102 07/12/2021 08:10 AM    CO2 26 07/12/2021 08:10 AM    Glucose 118 (H) 07/12/2021 08:10 AM    BUN 18 07/12/2021 08:10 AM    Creatinine 0.79 07/12/2021 08:10 AM    BUN/Creatinine ratio 23 07/12/2021 08:10 AM GFR est AA 95 07/12/2021 08:10 AM    GFR est non-AA 82 07/12/2021 08:10 AM    Calcium 9.4 07/12/2021 08:10 AM    Bilirubin, total 0.6 07/12/2021 08:10 AM    Alk. phosphatase 84 07/12/2021 08:10 AM    Protein, total 7.2 07/12/2021 08:10 AM    Albumin 4.7 (H) 07/12/2021 08:10 AM    A-G Ratio 1.9 07/12/2021 08:10 AM    ALT (SGPT) 12 07/12/2021 08:10 AM    AST (SGOT) 16 07/12/2021 08:10 AM     Lab Results   Component Value Date/Time    Sodium 139 07/12/2021 08:10 AM    Potassium 4.6 07/12/2021 08:10 AM    Chloride 102 07/12/2021 08:10 AM    CO2 26 07/12/2021 08:10 AM    Glucose 118 (H) 07/12/2021 08:10 AM    BUN 18 07/12/2021 08:10 AM    Creatinine 0.79 07/12/2021 08:10 AM    BUN/Creatinine ratio 23 07/12/2021 08:10 AM    GFR est AA 95 07/12/2021 08:10 AM    GFR est non-AA 82 07/12/2021 08:10 AM    Calcium 9.4 07/12/2021 08:10 AM    Bilirubin, total 0.6 07/12/2021 08:10 AM    Alk. phosphatase 84 07/12/2021 08:10 AM    Protein, total 7.2 07/12/2021 08:10 AM    Albumin 4.7 (H) 07/12/2021 08:10 AM    A-G Ratio 1.9 07/12/2021 08:10 AM    ALT (SGPT) 12 07/12/2021 08:10 AM    AST (SGOT) 16 07/12/2021 08:10 AM     Lab Results   Component Value Date/Time    WBC 5.1 07/12/2021 08:10 AM    HGB 14.6 07/12/2021 08:10 AM    HCT 44.3 07/12/2021 08:10 AM    PLATELET 298 26/69/9543 08:10 AM    MCV 88 07/12/2021 08:10 AM       Lab Results   Component Value Date/Time    Cholesterol, total 148 07/12/2021 08:10 AM    HDL Cholesterol 57 07/12/2021 08:10 AM    LDL, calculated 72 07/12/2021 08:10 AM    VLDL, calculated 19 07/12/2021 08:10 AM    Triglyceride 105 07/12/2021 08:10 AM     Lab Results   Component Value Date/Time    TSH 0.627 07/12/2021 08:10 AM             CARDIAC DIAGNOSTICS:     Cardiac Evaluation Includes:  I reviewed the results below.      EKG 6/28/21 - NSR, multifocal PVC's, RBBB, LAFB    NSTEMI ---> Cath 6/13 - LAD 99% ---> ROQUE to LAD   Carotids 1/19 - < 50% ICA stenosis bilat   Echo 1/19 - LVEF 60-65%, mild AS   Exercise Cardiolite 2/19 - 7 METS, no ischemia, nml EF      Echo 1/12/22 - LVEF 55-60%, AV sclerosis and mild AS, Asc Ao 3.9 cm         ASSESSMENT AND PLAN:     Assessment and Plan:    1) CAD   - He had PCI to LAD in 2013 after NSTEMI   - Exercise Cardiolite 2/19 - 7 METS, no ischemia, nml EF  - he is doing well lately and he denies angina  - continue cardiac meds as above (asa, statin, BB, ACE-I) ---> he is to call and verify his BB dose     2) HTN  - BP OK overall   - cont meds which he is tolerating and follow at home occasionally     3) Dyslipidemia  - cont statin   - prior lipids OK     4) Very Mild aortic stenosis  - follow     5) See me back in 6 months (his preference). Wife passed in May 2021 (ESRD). Retired builder. Moved back to [address] [facility] in 2021 to be near family. Almas Tripp MD, [address], [place] [address].  [address], [address] [place] [address]  Ph: [phone]   Ph [phone]

## 2022-01-12 NOTE — PROGRESS NOTES
Emily Wong is a 81 y.o. male    There were no vitals taken for this visit.     Chief Complaint   Patient presents with    Coronary Artery Disease    Hypertension    Other     DLD

## 2022-01-12 NOTE — PROGRESS NOTES
Chief Complaint   Patient presents with    Coronary Artery Disease     Echo today    Hypertension    Other     DLD     Visit Vitals  /62   Ht 5' 11\" (1.803 m)   Wt 223 lb (101.2 kg)   BMI 31.10 kg/m²     Chest pain denied   SOB denied   Palpitations denied   Swelling in hands/feet denied   Dizziness denied   Recent hospital stays denied   Refills denied     Pt forgot to bring his med list.  Stated the only medication that was added was tamsulosin. I don't have anything I'm taking BID. Pt stated he can call when gets home to confirm meds. Had prostate cancer last year, lasered, PSA is getting better.

## 2022-02-14 RX ORDER — METOPROLOL SUCCINATE 100 MG/1
100 TABLET, EXTENDED RELEASE ORAL DAILY
Qty: 90 TABLET | Refills: 1 | Status: SHIPPED | OUTPATIENT
Start: 2022-02-14 | End: 2022-02-16 | Stop reason: SDUPTHER

## 2022-02-15 ENCOUNTER — DOCUMENTATION ONLY (OUTPATIENT)
Dept: FAMILY MEDICINE CLINIC | Age: 87
End: 2022-02-15

## 2022-02-16 RX ORDER — METOPROLOL SUCCINATE 100 MG/1
100 TABLET, EXTENDED RELEASE ORAL DAILY
Qty: 90 TABLET | Refills: 1 | Status: ON HOLD | OUTPATIENT
Start: 2022-02-16 | End: 2022-06-29 | Stop reason: SDUPTHER

## 2022-03-02 ENCOUNTER — OFFICE VISIT (OUTPATIENT)
Dept: FAMILY MEDICINE CLINIC | Age: 87
End: 2022-03-02
Payer: MEDICARE

## 2022-03-02 VITALS
SYSTOLIC BLOOD PRESSURE: 123 MMHG | RESPIRATION RATE: 18 BRPM | DIASTOLIC BLOOD PRESSURE: 62 MMHG | HEART RATE: 64 BPM | HEIGHT: 71 IN | BODY MASS INDEX: 31.5 KG/M2 | OXYGEN SATURATION: 95 % | TEMPERATURE: 97.8 F | WEIGHT: 225 LBS

## 2022-03-02 DIAGNOSIS — C61 PROSTATE CA (HCC): ICD-10-CM

## 2022-03-02 DIAGNOSIS — S09.93XS: Primary | ICD-10-CM

## 2022-03-02 DIAGNOSIS — R73.01 ELEVATED FASTING GLUCOSE: ICD-10-CM

## 2022-03-02 DIAGNOSIS — I10 PRIMARY HYPERTENSION: ICD-10-CM

## 2022-03-02 PROCEDURE — G8752 SYS BP LESS 140: HCPCS | Performed by: STUDENT IN AN ORGANIZED HEALTH CARE EDUCATION/TRAINING PROGRAM

## 2022-03-02 PROCEDURE — 99214 OFFICE O/P EST MOD 30 MIN: CPT | Performed by: STUDENT IN AN ORGANIZED HEALTH CARE EDUCATION/TRAINING PROGRAM

## 2022-03-02 PROCEDURE — G8432 DEP SCR NOT DOC, RNG: HCPCS | Performed by: STUDENT IN AN ORGANIZED HEALTH CARE EDUCATION/TRAINING PROGRAM

## 2022-03-02 PROCEDURE — G8754 DIAS BP LESS 90: HCPCS | Performed by: STUDENT IN AN ORGANIZED HEALTH CARE EDUCATION/TRAINING PROGRAM

## 2022-03-02 PROCEDURE — G8427 DOCREV CUR MEDS BY ELIG CLIN: HCPCS | Performed by: STUDENT IN AN ORGANIZED HEALTH CARE EDUCATION/TRAINING PROGRAM

## 2022-03-02 PROCEDURE — G8417 CALC BMI ABV UP PARAM F/U: HCPCS | Performed by: STUDENT IN AN ORGANIZED HEALTH CARE EDUCATION/TRAINING PROGRAM

## 2022-03-02 PROCEDURE — G8536 NO DOC ELDER MAL SCRN: HCPCS | Performed by: STUDENT IN AN ORGANIZED HEALTH CARE EDUCATION/TRAINING PROGRAM

## 2022-03-02 PROCEDURE — 1101F PT FALLS ASSESS-DOCD LE1/YR: CPT | Performed by: STUDENT IN AN ORGANIZED HEALTH CARE EDUCATION/TRAINING PROGRAM

## 2022-03-02 RX ORDER — LISINOPRIL 10 MG/1
10 TABLET ORAL DAILY
Qty: 90 TABLET | Refills: 3 | Status: SHIPPED | OUTPATIENT
Start: 2022-03-02

## 2022-03-02 NOTE — PATIENT INSTRUCTIONS
Add in some walks during the week. Exercise goals:  150 minutes of moderate intensity cardio exercise in the week  3 days of total body strength training  Work on stretching/flexibility as well. It's great to get outside! But you can also check out youtube for fun videos and workouts. New shingles vaccine, Shingrix, is 2 doses. Second dose makes you feel sick so plan accordingly. It should be available at your local pharmacy.

## 2022-03-02 NOTE — PROGRESS NOTES
Patient here for htn , labs. 1. Have you been to the ER, urgent care clinic since your last visit? Hospitalized since your last visit? No    2. Have you seen or consulted any other health care providers outside of the [address] [place] [address] since your last visit? Include any pap smears or colon screening.  No

## 2022-03-02 NOTE — LETTER
3/8/2022    Mr. Shay Gaona [facility] [id number]      Dear Shay Fallon:    Please find your most recent results below. Resulted Orders   METABOLIC PANEL, COMPREHENSIVE   Result Value Ref Range    Glucose 167 (H) 65 - 99 mg/dL    BUN 21 8 - 27 mg/dL    Creatinine 1.05 0.76 - 1.27 mg/dL    eGFR 70 >59 mL/min/1.73    BUN/Creatinine ratio 20 10 - 24    Sodium 143 134 - 144 mmol/L    Potassium 4.3 3.5 - 5.2 mmol/L    Chloride 103 96 - 106 mmol/L    CO2 21 20 - 29 mmol/L    Calcium 9.4 8.6 - 10.2 mg/dL    Protein, total 6.8 6.0 - 8.5 g/dL    Albumin 4.5 3.6 - 4.6 g/dL    GLOBULIN, TOTAL 2.3 1.5 - 4.5 g/dL    A-G Ratio 2.0 1.2 - 2.2    Bilirubin, total 0.7 0.0 - 1.2 mg/dL    Alk. phosphatase 91 44 - 121 IU/L    AST (SGOT) 22 0 - 40 IU/L    ALT (SGPT) 18 0 - 44 IU/L    Narrative    Performed at:  [facility]  [address]  [id number]  : Gary Sanchez MD, Phone:  [phone]   HEMOGLOBIN A1C WITH EAG   Result Value Ref Range    Hemoglobin A1c 6.6 (H) 4.8 - 5.6 %    Estimated average glucose 143 mg/dL    Narrative    Performed at:  [address] [facility]  [id number]  : Gary Sanchez MD, Phone:  [phone]         RECOMMENDATIONS:    -Normal CBC, lipids, TSH    -Elevated blood sugar, otherwise normal CMP    -A1c now in the diabetic range, controlled.  May consider starting metformin or primarily focus on healthy lifestyle.     Please call me if you have any questions: [phone]    Sincerely,      Dr. Ada Rebolledo

## 2022-03-02 NOTE — PROGRESS NOTES
Progress Note    he is a 81y.o. year old male who presents for evalution. Assessment/ Plan:   Diagnoses and all orders for this visit:    1. Blunt trauma of face, sequela  Assessment & Plan:  Concerned about the healing of his face, feels like it has not gone back to normal and would like to see a specialist.    Orders:  -     REFERRAL TO PLASTIC SURGERY    2. Prostate CA ([address]  Assessment & Plan:   monitored by specialist. No acute findings meriting change in the plan      3. Primary hypertension  Assessment & Plan:   well controlled, continue current medications    Orders:  -     lisinopriL (PRINIVIL, ZESTRIL) 10 mg tablet; Take 1 Tablet by mouth daily.  -     METABOLIC PANEL, COMPREHENSIVE; Future  -     LIPID PANEL; Future  -     CBC WITH AUTOMATED DIFF; Future  -     TSH 3RD GENERATION; Future    4. Elevated fasting glucose  Assessment & Plan: With recent weight gain, reassess with labs. Orders:  -     HEMOGLOBIN A1C WITH EAG; Future    Other orders  -     CBC WITH AUTOMATED DIFF  -     METABOLIC PANEL, COMPREHENSIVE  -     LIPID PANEL  -     CVD REPORT  -     CKD REPORT  -     HEMOGLOBIN A1C WITH EAG  -     TSH 3RD GENERATION       Follow-up and Dispositions    · Return in about 7 months (around 10/3/2022) for follow-up blood pressure and medicare wellness visit. I have discussed the diagnosis with the patient and the intended plan as seen in the above orders. The patient has received an after-visit summary and questions were answered concerning future plans. Pt conveyed understanding of plan. Medication Side Effects and Warnings were discussed with patient        Subjective:     Chief Complaint   Patient presents with    Hypertension     6 month f/u     18 grandchildren  6 great-grandchildren now. Little girl is the new arrival, Analy Kirkpatrick (wife's name). Her parents will be  on his wedding anniversary. Going to visit and meet her soon.     Notes weight gain, had been down to 195 lbs. 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 Reports home weight is 214 lbs  Sleep is good. Stress management is good. Seeing Dr. Martínez April, cardiology  Seeing Dr. Fito Pérez, urology, last in January. Reports the numbers have gone in the right direction. Feels like face hasn't smoothed out since trauma. [place] stairs have been torn out and replaced. Saw the dermatologist in January who said it was too soon to assess further. Hasn't noticed any issues with motor function. 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 He is sensitive about his eyes due to wife's blindness. Reviewed PmHx, RxHx, FmHx, SocHx, AllgHx and updated and dated in the chart. Review of Systems - negative except as listed above in the HPI    Objective:     Vitals:    03/02/22 0852   BP: 123/62   Pulse: 64   Resp: 18   Temp: 97.8 °F (36.6 °C)   SpO2: 95%   Weight: 225 lb (102.1 kg)   Height: 5' 11\" (1.803 m)       Current Outpatient Medications   Medication Sig    lisinopriL (PRINIVIL, ZESTRIL) 10 mg tablet Take 1 Tablet by mouth daily.  metoprolol succinate (TOPROL-XL) 100 mg tablet Take 1 Tablet by mouth daily.  tamsulosin (FLOMAX) 0.4 mg capsule Take 0.4 mg by mouth daily.  cpap machine kit by Does Not Apply route.  cinnamon bark (Cinnamon) 500 mg cap Take 1,000 mg by mouth two (2) times a day.  MOMETASONE FUROATE, BULK, by Does Not Apply route. Topical on scapl daily.  atorvastatin (LIPITOR) 20 mg tablet Take 1 Tablet by mouth daily.  pantoprazole (PROTONIX) 40 mg tablet Take 1 Tablet by mouth daily.  amLODIPine (NORVASC) 10 mg tablet Take 1 Tablet by mouth daily.  aspirin delayed-release 81 mg tablet Take 81 mg by mouth every other day.  cetirizine HCl (ALLER-ABRAN PO) Take 1 Tablet by mouth daily.     nitroglycerin (NITROSTAT) 0.4 mg SL tablet 0.4 mg by SubLINGual route every five (5) minutes as needed for Chest Pain. Up to 3 doses.  docosahexaenoic acid/epa (FISH OIL PO) Take 300 mg by mouth two (2) times a day.  magnesium oxide (MAG-OX) 400 mg tablet Take 400 mg by mouth daily.  metFORMIN ER (GLUCOPHAGE XR) 500 mg tablet Take 1 Tablet by mouth daily (with dinner). No current facility-administered medications for this visit. Physical Exam  Vitals and nursing note reviewed. Constitutional:       General: He is not in acute distress. Appearance: Normal appearance. He is not ill-appearing, toxic-appearing or diaphoretic. HENT:      Head: Normocephalic and atraumatic. Eyes:      General: No scleral icterus. Right eye: No discharge. Left eye: No discharge. Conjunctiva/sclera: Conjunctivae normal.      Comments: Swelling over right eyelid, medial>lateral   Cardiovascular:      Rate and Rhythm: Normal rate and regular rhythm. Pulses: Normal pulses. Heart sounds: Murmur heard. Pulmonary:      Effort: Pulmonary effort is normal. No respiratory distress. Breath sounds: Normal breath sounds. Musculoskeletal:      Cervical back: No rigidity. Right lower leg: No edema. Left lower leg: No edema. Skin:     General: Skin is warm and dry. Neurological:      General: No focal deficit present. Mental Status: He is alert. Psychiatric:         Mood and Affect: Mood and affect normal.         Behavior: Behavior normal.         Thought Content:  Thought content normal.         Judgment: Judgment normal.              Say Hammond MD

## 2022-03-03 PROBLEM — E11.9 DIABETES MELLITUS TYPE 2, DIET-CONTROLLED (HCC): Status: ACTIVE | Noted: 2021-09-01

## 2022-03-03 LAB
ALBUMIN SERPL-MCNC: 4.5 G/DL (ref 3.6–4.6)
ALBUMIN/GLOB SERPL: 2 {RATIO} (ref 1.2–2.2)
ALP SERPL-CCNC: 91 IU/L (ref 44–121)
ALT SERPL-CCNC: 18 IU/L (ref 0–44)
AST SERPL-CCNC: 22 IU/L (ref 0–40)
BASOPHILS # BLD AUTO: 0.1 X10E3/UL (ref 0–0.2)
BASOPHILS NFR BLD AUTO: 1 %
BILIRUB SERPL-MCNC: 0.7 MG/DL (ref 0–1.2)
BUN SERPL-MCNC: 21 MG/DL (ref 8–27)
BUN/CREAT SERPL: 20 (ref 10–24)
CALCIUM SERPL-MCNC: 9.4 MG/DL (ref 8.6–10.2)
CHLORIDE SERPL-SCNC: 103 MMOL/L (ref 96–106)
CHOLEST SERPL-MCNC: 128 MG/DL (ref 100–199)
CO2 SERPL-SCNC: 21 MMOL/L (ref 20–29)
CREAT SERPL-MCNC: 1.05 MG/DL (ref 0.76–1.27)
EGFR: 70 ML/MIN/1.73
EOSINOPHIL # BLD AUTO: 0.3 X10E3/UL (ref 0–0.4)
EOSINOPHIL NFR BLD AUTO: 5 %
ERYTHROCYTE [DISTWIDTH] IN BLOOD BY AUTOMATED COUNT: 13.1 % (ref 11.6–15.4)
EST. AVERAGE GLUCOSE BLD GHB EST-MCNC: 143 MG/DL
GLOBULIN SER CALC-MCNC: 2.3 G/DL (ref 1.5–4.5)
GLUCOSE SERPL-MCNC: 167 MG/DL (ref 65–99)
HBA1C MFR BLD: 6.6 % (ref 4.8–5.6)
HCT VFR BLD AUTO: 42.1 % (ref 37.5–51)
HDLC SERPL-MCNC: 48 MG/DL
HGB BLD-MCNC: 13.8 G/DL (ref 13–17.7)
IMM GRANULOCYTES # BLD AUTO: 0 X10E3/UL (ref 0–0.1)
IMM GRANULOCYTES NFR BLD AUTO: 1 %
IMP & REVIEW OF LAB RESULTS: NORMAL
INTERPRETATION: NORMAL
LDLC SERPL CALC-MCNC: 57 MG/DL (ref 0–99)
LYMPHOCYTES # BLD AUTO: 1 X10E3/UL (ref 0.7–3.1)
LYMPHOCYTES NFR BLD AUTO: 16 %
MCH RBC QN AUTO: 27.8 PG (ref 26.6–33)
MCHC RBC AUTO-ENTMCNC: 32.8 G/DL (ref 31.5–35.7)
MCV RBC AUTO: 85 FL (ref 79–97)
MONOCYTES # BLD AUTO: 0.5 X10E3/UL (ref 0.1–0.9)
MONOCYTES NFR BLD AUTO: 8 %
NEUTROPHILS # BLD AUTO: 4.4 X10E3/UL (ref 1.4–7)
NEUTROPHILS NFR BLD AUTO: 69 %
PLATELET # BLD AUTO: 215 X10E3/UL (ref 150–450)
POTASSIUM SERPL-SCNC: 4.3 MMOL/L (ref 3.5–5.2)
PROT SERPL-MCNC: 6.8 G/DL (ref 6–8.5)
RBC # BLD AUTO: 4.96 X10E6/UL (ref 4.14–5.8)
SODIUM SERPL-SCNC: 143 MMOL/L (ref 134–144)
TRIGL SERPL-MCNC: 134 MG/DL (ref 0–149)
TSH SERPL DL<=0.005 MIU/L-ACNC: 0.65 UIU/ML (ref 0.45–4.5)
VLDLC SERPL CALC-MCNC: 23 MG/DL (ref 5–40)
WBC # BLD AUTO: 6.3 X10E3/UL (ref 3.4–10.8)

## 2022-03-04 NOTE — PROGRESS NOTES
Normal CBC, lipids, TSH  Elevated blood sugar, otherwise normal CMP  A1c now in the diabetic range, controlled. May consider starting metformin or primarily focus on healthy lifestyle.

## 2022-03-11 ENCOUNTER — TELEPHONE (OUTPATIENT)
Dept: FAMILY MEDICINE CLINIC | Age: 87
End: 2022-03-11

## 2022-03-11 RX ORDER — METFORMIN HYDROCHLORIDE 500 MG/1
500 TABLET, EXTENDED RELEASE ORAL
Qty: 90 TABLET | Refills: 1 | Status: SHIPPED | OUTPATIENT
Start: 2022-03-11 | End: 2022-09-07

## 2022-03-11 NOTE — TELEPHONE ENCOUNTER
Pt states he saw lab results and in agreement with starting metformin therapy. Please send 90 day supply to [facility].     Pt DB#[phone]

## 2022-03-14 PROBLEM — S09.93XA BLUNT TRAUMA OF FACE: Status: ACTIVE | Noted: 2022-03-14

## 2022-03-15 NOTE — ASSESSMENT & PLAN NOTE
Concerned about the healing of his face, feels like it has not gone back to normal and would like to see a specialist.

## 2022-03-19 PROBLEM — F43.21 GRIEVING: Status: ACTIVE | Noted: 2021-09-01

## 2022-03-19 PROBLEM — N40.0 BPH (BENIGN PROSTATIC HYPERPLASIA): Status: ACTIVE | Noted: 2021-07-26

## 2022-03-19 PROBLEM — E11.9 DIABETES MELLITUS TYPE 2, DIET-CONTROLLED (HCC): Status: ACTIVE | Noted: 2021-09-01

## 2022-03-19 PROBLEM — S09.93XA BLUNT TRAUMA OF FACE: Status: ACTIVE | Noted: 2022-03-14

## 2022-04-09 ASSESSMENT — TONOMETRY
OD_IOP_MMHG: 12
OD_IOP_MMHG: 10
OS_IOP_MMHG: 17
OS_IOP_MMHG: 13
OD_IOP_MMHG: 12
OS_IOP_MMHG: 12
OS_IOP_MMHG: 12
OS_IOP_MMHG: 13
OS_IOP_MMHG: 13
OD_IOP_MMHG: 12
OS_IOP_MMHG: 12

## 2022-04-09 ASSESSMENT — VISUAL ACUITY
OS_GLARE: 20/63+
OD_CC: 20/20
OS_PH: 20/32+
OS_CC: 20/20-1
OD_PAM: 20/20
OD_CC: 20/20-1
OD_CC: 20/29
OD_GLARE: 20/32+
OU_CC: 20/20
OS_PH: 20/30-2
OS_AM: 20/25
OS_CC: 20/20
OD_CC: 20/20
OD_GLARE: 20/32+
OD_PAM: 20/20
OS_CC: 20/20
OS_CC: 20/40-
OD_GLARE: 20/32+
OS_CC: 20/20
OS_GLARE: 20/63+
OD_CC: 20/20-2
OD_CC: 20/20-
OS_CC: 20/40

## 2022-06-23 DIAGNOSIS — K21.9 GASTROESOPHAGEAL REFLUX DISEASE, UNSPECIFIED WHETHER ESOPHAGITIS PRESENT: ICD-10-CM

## 2022-06-23 RX ORDER — PANTOPRAZOLE SODIUM 40 MG/1
40 TABLET, DELAYED RELEASE ORAL DAILY
Qty: 90 TABLET | Refills: 3 | Status: SHIPPED | OUTPATIENT
Start: 2022-06-23

## 2022-06-27 ENCOUNTER — HOSPITAL ENCOUNTER (INPATIENT)
Age: 87
LOS: 4 days | Discharge: HOME HEALTH | DRG: 287 | End: 2022-07-01
Attending: EMERGENCY MEDICINE | Admitting: FAMILY MEDICINE
Payer: MEDICARE

## 2022-06-27 ENCOUNTER — APPOINTMENT (OUTPATIENT)
Dept: GENERAL RADIOLOGY | Age: 87
DRG: 287 | End: 2022-06-27
Attending: EMERGENCY MEDICINE
Payer: MEDICARE

## 2022-06-27 DIAGNOSIS — I10 PRIMARY HYPERTENSION: ICD-10-CM

## 2022-06-27 DIAGNOSIS — K21.9 GASTROESOPHAGEAL REFLUX DISEASE, UNSPECIFIED WHETHER ESOPHAGITIS PRESENT: ICD-10-CM

## 2022-06-27 DIAGNOSIS — I25.10 CORONARY ARTERY DISEASE INVOLVING NATIVE CORONARY ARTERY OF NATIVE HEART WITHOUT ANGINA PECTORIS: ICD-10-CM

## 2022-06-27 DIAGNOSIS — R94.39 ABNORMAL STRESS TEST: ICD-10-CM

## 2022-06-27 DIAGNOSIS — I20.9 ANGINA PECTORIS (HCC): Primary | ICD-10-CM

## 2022-06-27 DIAGNOSIS — E78.5 DYSLIPIDEMIA: ICD-10-CM

## 2022-06-27 DIAGNOSIS — N40.0 BENIGN PROSTATIC HYPERPLASIA, UNSPECIFIED WHETHER LOWER URINARY TRACT SYMPTOMS PRESENT: ICD-10-CM

## 2022-06-27 DIAGNOSIS — E11.9 DIABETES MELLITUS TYPE 2, DIET-CONTROLLED (HCC): ICD-10-CM

## 2022-06-27 LAB
ANION GAP SERPL CALC-SCNC: 12 MMOL/L (ref 5–15)
BASOPHILS # BLD: 0.1 K/UL (ref 0–0.1)
BASOPHILS NFR BLD: 1 % (ref 0–1)
BUN SERPL-MCNC: 17 MG/DL (ref 8–23)
BUN/CREAT SERPL: 22 (ref 12–20)
CALCIUM SERPL-MCNC: 9.2 MG/DL (ref 8.8–10.2)
CHLORIDE SERPL-SCNC: 105 MMOL/L (ref 98–107)
CO2 SERPL-SCNC: 24 MMOL/L (ref 22–29)
CREAT SERPL-MCNC: 0.78 MG/DL (ref 0.7–1.2)
DIFFERENTIAL METHOD BLD: NORMAL
EOSINOPHIL # BLD: 0.3 K/UL (ref 0–0.4)
EOSINOPHIL NFR BLD: 5 % (ref 0–7)
ERYTHROCYTE [DISTWIDTH] IN BLOOD BY AUTOMATED COUNT: 14.5 % (ref 11.5–14.5)
GLUCOSE SERPL-MCNC: 172 MG/DL (ref 65–100)
HCT VFR BLD AUTO: 41.9 % (ref 36.6–50.3)
HGB BLD-MCNC: 13.8 G/DL (ref 12.1–17)
IMM GRANULOCYTES # BLD AUTO: 0 K/UL (ref 0–0.04)
IMM GRANULOCYTES NFR BLD AUTO: 0 % (ref 0–0.5)
LYMPHOCYTES # BLD: 1.1 K/UL (ref 0.8–3.5)
LYMPHOCYTES NFR BLD: 18 % (ref 12–49)
MCH RBC QN AUTO: 27.8 PG (ref 26–34)
MCHC RBC AUTO-ENTMCNC: 32.9 G/DL (ref 30–36.5)
MCV RBC AUTO: 84.3 FL (ref 80–99)
MONOCYTES # BLD: 0.6 K/UL (ref 0–1)
MONOCYTES NFR BLD: 10 % (ref 5–13)
NEUTS SEG # BLD: 4.3 K/UL (ref 1.8–8)
NEUTS SEG NFR BLD: 67 % (ref 32–75)
NRBC # BLD: 0 K/UL (ref 0–0.01)
NRBC BLD-RTO: 0 PER 100 WBC
PLATELET # BLD AUTO: 185 K/UL (ref 150–400)
PMV BLD AUTO: 10 FL (ref 8.9–12.9)
POTASSIUM SERPL-SCNC: 4 MMOL/L (ref 3.5–5.1)
RBC # BLD AUTO: 4.97 M/UL (ref 4.1–5.7)
SODIUM SERPL-SCNC: 141 MMOL/L (ref 136–145)
TROPONIN I BLD-MCNC: <0.04 NG/ML (ref 0–0.08)
WBC # BLD AUTO: 6.4 K/UL (ref 4.1–11.1)

## 2022-06-27 PROCEDURE — 84484 ASSAY OF TROPONIN QUANT: CPT

## 2022-06-27 PROCEDURE — 99285 EMERGENCY DEPT VISIT HI MDM: CPT

## 2022-06-27 PROCEDURE — 65270000046 HC RM TELEMETRY

## 2022-06-27 PROCEDURE — 36415 COLL VENOUS BLD VENIPUNCTURE: CPT

## 2022-06-27 PROCEDURE — 85025 COMPLETE CBC W/AUTO DIFF WBC: CPT

## 2022-06-27 PROCEDURE — 80048 BASIC METABOLIC PNL TOTAL CA: CPT

## 2022-06-27 PROCEDURE — 74011250637 HC RX REV CODE- 250/637: Performed by: EMERGENCY MEDICINE

## 2022-06-27 PROCEDURE — 71046 X-RAY EXAM CHEST 2 VIEWS: CPT

## 2022-06-27 PROCEDURE — 93005 ELECTROCARDIOGRAM TRACING: CPT

## 2022-06-27 RX ORDER — GUAIFENESIN 100 MG/5ML
243 LIQUID (ML) ORAL
Status: COMPLETED | OUTPATIENT
Start: 2022-06-27 | End: 2022-06-27

## 2022-06-27 RX ADMIN — ASPIRIN 243 MG: 81 TABLET, CHEWABLE ORAL at 19:19

## 2022-06-27 RX ADMIN — NITROGLYCERIN 1 INCH: 20 OINTMENT TOPICAL at 20:14

## 2022-06-27 NOTE — Clinical Note
TRANSFER - OUT REPORT:     Verbal report given to: Iftikhar Martinez. Report consisted of patient's Situation, Background, Assessment and   Recommendations(SBAR). Opportunity for questions and clarification was provided. Patient transported with a Registered Nurse. Patient transported to: recovery.

## 2022-06-27 NOTE — Clinical Note
TRANSFER - IN REPORT:     Verbal report received from: Elke Massey. Report consisted of patient's Situation, Background, Assessment and   Recommendations(SBAR). Opportunity for questions and clarification was provided. Assessment completed upon patient's arrival to unit and care assumed. Patient transported with a Registered Nurse and Monitor.

## 2022-06-27 NOTE — ED TRIAGE NOTES
Pt states that he started having chest pain approx 15 min pta. States that the pain was rt sided and sharp in nature. Pt states that he took 2 SL nitro and now is pain free. Pt states that he had a MI in 2013 but the pain was different. No s/s of distress obs. Skin warm and dry. Resp wnl. Neuro intact.

## 2022-06-28 ENCOUNTER — APPOINTMENT (OUTPATIENT)
Dept: VASCULAR SURGERY | Age: 87
DRG: 287 | End: 2022-06-28
Attending: STUDENT IN AN ORGANIZED HEALTH CARE EDUCATION/TRAINING PROGRAM
Payer: MEDICARE

## 2022-06-28 ENCOUNTER — APPOINTMENT (OUTPATIENT)
Dept: NON INVASIVE DIAGNOSTICS | Age: 87
DRG: 287 | End: 2022-06-28
Attending: NURSE PRACTITIONER
Payer: MEDICARE

## 2022-06-28 LAB
ANION GAP SERPL CALC-SCNC: 7 MMOL/L (ref 5–15)
BUN SERPL-MCNC: 16 MG/DL (ref 6–20)
BUN/CREAT SERPL: 21 (ref 12–20)
CALCIUM SERPL-MCNC: 9.1 MG/DL (ref 8.5–10.1)
CHLORIDE SERPL-SCNC: 107 MMOL/L (ref 97–108)
CO2 SERPL-SCNC: 27 MMOL/L (ref 21–32)
COMMENT, HOLDF: NORMAL
CREAT SERPL-MCNC: 0.75 MG/DL (ref 0.7–1.3)
ECHO AO ARCH DIAM: 2.5 CM
ECHO AO ASC DIAM: 3.8 CM
ECHO AO ASCENDING AORTA INDEX: 1.78 CM/M2
ECHO AV AREA PEAK VELOCITY: 1.7 CM2
ECHO AV AREA/BSA PEAK VELOCITY: 0.8 CM2/M2
ECHO AV PEAK GRADIENT: 13 MMHG
ECHO AV PEAK VELOCITY: 1.8 M/S
ECHO AV VELOCITY RATIO: 0.44
ECHO LA DIAMETER INDEX: 1.6 CM/M2
ECHO LA DIAMETER: 3.4 CM
ECHO LA VOL 2C: 33 ML (ref 18–58)
ECHO LA VOL 4C: 33 ML (ref 18–58)
ECHO LA VOL BP: 34 ML (ref 18–58)
ECHO LA VOL/BSA BIPLANE: 16 ML/M2 (ref 16–34)
ECHO LA VOLUME AREA LENGTH: 38 ML
ECHO LA VOLUME INDEX A2C: 15 ML/M2 (ref 16–34)
ECHO LA VOLUME INDEX A4C: 15 ML/M2 (ref 16–34)
ECHO LA VOLUME INDEX AREA LENGTH: 18 ML/M2 (ref 16–34)
ECHO LV E' LATERAL VELOCITY: 7 CM/S
ECHO LV E' SEPTAL VELOCITY: 5 CM/S
ECHO LV EDV A2C: 106 ML
ECHO LV EDV A4C: 163 ML
ECHO LV EDV BP: 132 ML (ref 67–155)
ECHO LV EDV INDEX A4C: 77 ML/M2
ECHO LV EDV INDEX BP: 62 ML/M2
ECHO LV EDV NDEX A2C: 50 ML/M2
ECHO LV EF PHYSICIAN: 60 %
ECHO LV EJECTION FRACTION A2C: 50 %
ECHO LV EJECTION FRACTION A4C: 50 %
ECHO LV ESV A2C: 53 ML
ECHO LV ESV A4C: 82 ML
ECHO LV ESV BP: 67 ML (ref 22–58)
ECHO LV ESV INDEX A2C: 25 ML/M2
ECHO LV ESV INDEX A4C: 38 ML/M2
ECHO LV ESV INDEX BP: 31 ML/M2
ECHO LV FRACTIONAL SHORTENING: 35 % (ref 28–44)
ECHO LV INTERNAL DIMENSION DIASTOLE INDEX: 2.25 CM/M2
ECHO LV INTERNAL DIMENSION DIASTOLIC: 4.8 CM (ref 4.2–5.9)
ECHO LV INTERNAL DIMENSION SYSTOLIC INDEX: 1.46 CM/M2
ECHO LV INTERNAL DIMENSION SYSTOLIC: 3.1 CM
ECHO LV IVSD: 0.8 CM (ref 0.6–1)
ECHO LV MASS 2D: 126.7 G (ref 88–224)
ECHO LV MASS INDEX 2D: 59.5 G/M2 (ref 49–115)
ECHO LV POSTERIOR WALL DIASTOLIC: 0.8 CM (ref 0.6–1)
ECHO LV RELATIVE WALL THICKNESS RATIO: 0.33
ECHO LVOT AREA: 3.8 CM2
ECHO LVOT DIAM: 2.2 CM
ECHO LVOT PEAK GRADIENT: 3 MMHG
ECHO LVOT PEAK VELOCITY: 0.8 M/S
ECHO MV A VELOCITY: 0.83 M/S
ECHO MV E DECELERATION TIME (DT): 360.3 MS
ECHO MV E VELOCITY: 0.65 M/S
ECHO MV E/A RATIO: 0.78
ECHO MV E/E' LATERAL: 9.29
ECHO MV E/E' RATIO (AVERAGED): 11.14
ECHO MV E/E' SEPTAL: 13
ECHO MV REGURGITANT PEAK GRADIENT: 112 MMHG
ECHO MV REGURGITANT PEAK VELOCITY: 5.3 M/S
ECHO PULMONARY ARTERY END DIASTOLIC PRESSURE: 3 MMHG
ECHO PV MAX VELOCITY: 1 M/S
ECHO PV PEAK GRADIENT: 4 MMHG
ECHO PV REGURGITANT MAX VELOCITY: 0.9 M/S
ECHO RV FREE WALL PEAK S': 9 CM/S
ECHO RV INTERNAL DIMENSION: 4.2 CM
ECHO RV TAPSE: 2.1 CM (ref 1.7–?)
ECHO TV REGURGITANT MAX VELOCITY: 2.61 M/S
ECHO TV REGURGITANT PEAK GRADIENT: 27 MMHG
ERYTHROCYTE [DISTWIDTH] IN BLOOD BY AUTOMATED COUNT: 14.3 % (ref 11.5–14.5)
GLUCOSE SERPL-MCNC: 107 MG/DL (ref 65–100)
HCT VFR BLD AUTO: 41.5 % (ref 36.6–50.3)
HGB BLD-MCNC: 13.6 G/DL (ref 12.1–17)
LACTATE SERPL-SCNC: 1.1 MMOL/L (ref 0.4–2)
MAGNESIUM SERPL-MCNC: 1.8 MG/DL (ref 1.6–2.4)
MCH RBC QN AUTO: 28 PG (ref 26–34)
MCHC RBC AUTO-ENTMCNC: 32.8 G/DL (ref 30–36.5)
MCV RBC AUTO: 85.4 FL (ref 80–99)
NRBC # BLD: 0 K/UL (ref 0–0.01)
NRBC BLD-RTO: 0 PER 100 WBC
PLATELET # BLD AUTO: 174 K/UL (ref 150–400)
PMV BLD AUTO: 10.5 FL (ref 8.9–12.9)
POTASSIUM SERPL-SCNC: 4 MMOL/L (ref 3.5–5.1)
RBC # BLD AUTO: 4.86 M/UL (ref 4.1–5.7)
SAMPLES BEING HELD,HOLD: NORMAL
SODIUM SERPL-SCNC: 141 MMOL/L (ref 136–145)
TROPONIN-HIGH SENSITIVITY: 11 NG/L (ref 0–76)
TROPONIN-HIGH SENSITIVITY: 9 NG/L (ref 0–76)
WBC # BLD AUTO: 5.8 K/UL (ref 4.1–11.1)

## 2022-06-28 PROCEDURE — 93306 TTE W/DOPPLER COMPLETE: CPT | Performed by: INTERNAL MEDICINE

## 2022-06-28 PROCEDURE — 93306 TTE W/DOPPLER COMPLETE: CPT

## 2022-06-28 PROCEDURE — 97530 THERAPEUTIC ACTIVITIES: CPT

## 2022-06-28 PROCEDURE — 80048 BASIC METABOLIC PNL TOTAL CA: CPT

## 2022-06-28 PROCEDURE — 97535 SELF CARE MNGMENT TRAINING: CPT

## 2022-06-28 PROCEDURE — 97116 GAIT TRAINING THERAPY: CPT

## 2022-06-28 PROCEDURE — 83605 ASSAY OF LACTIC ACID: CPT

## 2022-06-28 PROCEDURE — 83735 ASSAY OF MAGNESIUM: CPT

## 2022-06-28 PROCEDURE — 85027 COMPLETE CBC AUTOMATED: CPT

## 2022-06-28 PROCEDURE — 93005 ELECTROCARDIOGRAM TRACING: CPT

## 2022-06-28 PROCEDURE — 93970 EXTREMITY STUDY: CPT

## 2022-06-28 PROCEDURE — 99222 1ST HOSP IP/OBS MODERATE 55: CPT | Performed by: FAMILY MEDICINE

## 2022-06-28 PROCEDURE — 36415 COLL VENOUS BLD VENIPUNCTURE: CPT

## 2022-06-28 PROCEDURE — 97161 PT EVAL LOW COMPLEX 20 MIN: CPT

## 2022-06-28 PROCEDURE — 74011250637 HC RX REV CODE- 250/637: Performed by: STUDENT IN AN ORGANIZED HEALTH CARE EDUCATION/TRAINING PROGRAM

## 2022-06-28 PROCEDURE — 84484 ASSAY OF TROPONIN QUANT: CPT

## 2022-06-28 PROCEDURE — 99222 1ST HOSP IP/OBS MODERATE 55: CPT | Performed by: SPECIALIST

## 2022-06-28 PROCEDURE — 65270000046 HC RM TELEMETRY

## 2022-06-28 PROCEDURE — 97165 OT EVAL LOW COMPLEX 30 MIN: CPT

## 2022-06-28 PROCEDURE — APPSS30 APP SPLIT SHARED TIME 16-30 MINUTES: Performed by: NURSE PRACTITIONER

## 2022-06-28 RX ORDER — ENOXAPARIN SODIUM 100 MG/ML
40 INJECTION SUBCUTANEOUS DAILY
Status: DISCONTINUED | OUTPATIENT
Start: 2022-06-28 | End: 2022-07-01 | Stop reason: HOSPADM

## 2022-06-28 RX ORDER — LISINOPRIL 5 MG/1
10 TABLET ORAL DAILY
Status: DISCONTINUED | OUTPATIENT
Start: 2022-06-28 | End: 2022-07-01 | Stop reason: HOSPADM

## 2022-06-28 RX ORDER — PANTOPRAZOLE SODIUM 40 MG/1
40 TABLET, DELAYED RELEASE ORAL
Status: DISCONTINUED | OUTPATIENT
Start: 2022-06-28 | End: 2022-07-01 | Stop reason: HOSPADM

## 2022-06-28 RX ORDER — CETIRIZINE HCL 10 MG
10 TABLET ORAL DAILY
Status: DISCONTINUED | OUTPATIENT
Start: 2022-06-28 | End: 2022-07-01 | Stop reason: HOSPADM

## 2022-06-28 RX ORDER — ASPIRIN 81 MG/1
81 TABLET ORAL EVERY OTHER DAY
Status: DISCONTINUED | OUTPATIENT
Start: 2022-06-28 | End: 2022-07-01 | Stop reason: HOSPADM

## 2022-06-28 RX ORDER — ATORVASTATIN CALCIUM 20 MG/1
20 TABLET, FILM COATED ORAL DAILY
Status: DISCONTINUED | OUTPATIENT
Start: 2022-06-28 | End: 2022-07-01 | Stop reason: HOSPADM

## 2022-06-28 RX ORDER — LANOLIN ALCOHOL/MO/W.PET/CERES
400 CREAM (GRAM) TOPICAL DAILY
Status: DISCONTINUED | OUTPATIENT
Start: 2022-06-28 | End: 2022-07-01 | Stop reason: HOSPADM

## 2022-06-28 RX ORDER — TAMSULOSIN HYDROCHLORIDE 0.4 MG/1
0.4 CAPSULE ORAL DAILY
Status: DISCONTINUED | OUTPATIENT
Start: 2022-06-28 | End: 2022-07-01 | Stop reason: HOSPADM

## 2022-06-28 RX ORDER — AMLODIPINE BESYLATE 5 MG/1
10 TABLET ORAL DAILY
Status: DISCONTINUED | OUTPATIENT
Start: 2022-06-28 | End: 2022-07-01 | Stop reason: HOSPADM

## 2022-06-28 RX ORDER — METOPROLOL SUCCINATE 50 MG/1
100 TABLET, EXTENDED RELEASE ORAL DAILY
Status: DISCONTINUED | OUTPATIENT
Start: 2022-06-28 | End: 2022-06-29

## 2022-06-28 RX ADMIN — Medication 400 MG: at 09:11

## 2022-06-28 RX ADMIN — PANTOPRAZOLE SODIUM 40 MG: 40 TABLET, DELAYED RELEASE ORAL at 06:48

## 2022-06-28 RX ADMIN — ATORVASTATIN CALCIUM 20 MG: 20 TABLET, FILM COATED ORAL at 09:11

## 2022-06-28 RX ADMIN — AMLODIPINE BESYLATE 10 MG: 5 TABLET ORAL at 09:11

## 2022-06-28 RX ADMIN — ASPIRIN 81 MG: 81 TABLET, COATED ORAL at 09:11

## 2022-06-28 RX ADMIN — LISINOPRIL 10 MG: 5 TABLET ORAL at 09:11

## 2022-06-28 RX ADMIN — TAMSULOSIN HYDROCHLORIDE 0.4 MG: 0.4 CAPSULE ORAL at 09:11

## 2022-06-28 RX ADMIN — CETIRIZINE HYDROCHLORIDE 10 MG: 10 TABLET, FILM COATED ORAL at 09:11

## 2022-06-28 NOTE — PROGRESS NOTES
Physical Therapy Note    Chart reviewed. Duplex B LE to rule out DVT ordered. Will follow-up as appropriate and able following results.     Avel Andrea, PT, DPT

## 2022-06-28 NOTE — ED NOTES
Critical Care transport in department. No change noted in pt condition. Report given and care handed over.

## 2022-06-28 NOTE — PROGRESS NOTES
Senior Resident Admission Note     CC: Chest pain. HPI:  Kay Mayorga is a 81 y.o. male w/ a PMHX of CAD, Type II DM, HLD, HTN, Prostate CA who presents to the ER complaining of chest pain. According to patient, he was sitting on his chair when he all of a sudden had right sided chest pain. Sharp, occurred for a few seconds and went away. He had frequent episodes. He took 81 mg of ASA before he came to the ED and also Nitroglycerin. When patient was evaluated, he did not have chest pain. ED doctor discussed case with Dr. Paddy Liu (cardiology), he agress with admission for additional cardiac workup. Chart reviewed. Patient seen, examined, and discussed with Dr. Aisha Hager (PGY-1). See H&P for more details. A/P: Admit to Tele. Code status: Full code. 1.  Angina pectoris: in the setting of CAD. Episodes of sharp right sided chest pain. Currently, patient does not have chest pain. POC trop: <0.04. CXR: NAP. Per ED's note: EKG showed normal sinus rhythm with a first-degree AV block, 72 bpm, premature atrial complexes, right bundle branch block, no STEMI. Received Nitroglycerin and 243 ASA in the ED TSH (03/2/2022) wnl. Echo 01/2022: EF 55-60%. Grade I diastolic dysfunction. He had PCI to LAD in 2013 after NSTEMI   - Cardiac monitoring.   - Trend trop  - Mag daily.   - Follow up EKG. - Cardiology consulted, appreciate recs. I agree with remaining assessment and plan as documented in Dr. Chris Dillon Full H&P. Pt discussed with Dr. Jack Pace. (on-call attending physician).     Willis Voss MD  Family Medicine Resident, PGY-2

## 2022-06-28 NOTE — PROGRESS NOTES
Problem: Falls - Risk of  Goal: *Absence of Falls  Description: Document [place] Fall Risk and appropriate interventions in the flowsheet.   Outcome: Progressing Towards Goal  Note: Fall Risk Interventions:  Mobility Interventions: Bed/chair exit alarm                   History of Falls Interventions: Bed/chair exit alarm

## 2022-06-28 NOTE — H&P
[address] [place] [address]   Office [phone]  Fax [phone]       Admission H&P     Name: Isael Galvez MRN: [id number]  Sex: Male   YOB: 1935  Age: 81 y.o. PCP: Susanne Michele MD     Source of Information: patient, medical records    Chief complaint: Chest pain    History of Present Illness  Isael Galvez is a 81 y.o. male with known history of CAD (s/p stent x1 in 2013), T2DM, HTN, HLD, GERD, prostate cancer who presents to the ER complaining of chest pain. He says that yesterday he was painting throughout the day, and when he sat down after he finished, he began to feel a sense sharp pain in his right upper chest.  The pain lasted for few seconds, and went away, but then returned several minutes later. At this time he took an aspirin and a nitro, followed by another nitro 5 minutes later, with relief. He then went to the emergency department. He has not experienced chest pain since the second dose of nitro. Denies shortness of breath, nausea, vomiting, radiating pain. COVID Screening Questions:   Experiencing any of the following symptoms: fever, chills, cough, SOB, diarrhea, URI symptoms. No  Any Sick contacts with fever, cough, diarrhea, SOB, URI symptoms. No  Traveled out of state or out of country. 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 No    In the Er:   vital signs were   Visit Vitals  /68 (BP 1 Location: Right upper arm, BP Patient Position: At rest)   Pulse 60   Temp 97.6 °F (36.4 °C)   Resp 16   Ht 5' 11\" (1.803 m)   Wt 205 lb (93 kg)   SpO2 92%   BMI 28.59 kg/m²     Labs were remarkable for troponin <0.04. EKG showed: Per ED provider, first-degree AV block, 72 bpm, premature atrial complexes, right bundle branch block, no STEMI. Similar to EKG from 6/28/2021. Imaging showed: CXR: Clear lungs. Treatment included  mg, Nitropaste.      Patient Vitals for the past 12 hrs:   Temp Pulse Resp BP SpO2   06/28/22 0249 97.6 °F (36.4 °C) 60 16 125/68 92 %   06/28/22 0030 -- (!) 53 -- -- --   06/28/22 0007 97.9 °F (36.6 °C) (!) 56 18 131/66 95 %   06/27/22 2315 -- -- -- (!) 131/58 --   06/27/22 2300 -- (!) 56 -- (!) 106/58 96 %   06/27/22 2245 -- 63 -- 123/60 93 %   06/27/22 2230 -- 61 -- 114/64 90 %   06/27/22 2215 -- (!) 55 -- 120/74 94 %   06/27/22 2200 -- 64 -- 115/62 95 %   06/27/22 2145 -- 61 -- (!) 110/58 96 %   06/27/22 2130 -- 68 -- (!) 124/58 93 %   06/27/22 2115 -- 65 -- (!) 126/59 95 %   06/27/22 2100 -- 64 -- (!) 120/58 91 %   06/27/22 2045 -- (!) 59 -- 125/68 92 %   06/27/22 2030 -- 63 -- 125/62 91 %   06/27/22 2015 -- 62 19 122/63 93 %   06/27/22 2014 -- 62 -- 120/66 --   06/27/22 2000 -- 63 -- 120/66 95 %   06/27/22 1945 -- 67 14 123/86 --   06/1935 98.6 °F (37 °C) 78 18 (!) 142/87 98 %        Past Medical History:   Diagnosis Date    CAD (coronary artery disease)     NSTEMI ---> Cath 6/13 - LAD 99% ---> ROQUE to LAD     Dyslipidemia     [facility] Leiden [facility], [facility]     H/O carpal tunnel repair     HTN (hypertension)     Prostate CA ([facility]     radiation treatments         Home Medications   Prior to Admission medications    Medication Sig Start Date End Date Taking? Authorizing Provider   pantoprazole (PROTONIX) 40 mg tablet Take 1 Tablet by mouth daily. 6/23/22  Yes Nigel Sifuentes MD   amLODIPine (NORVASC) 10 mg tablet Take 1 Tablet by mouth daily. 5/23/22  Yes Nigel Sifuentes MD   metFORMIN ER (GLUCOPHAGE XR) 500 mg tablet Take 1 Tablet by mouth daily (with dinner). 3/11/22  Yes Nigel Sifuentes MD   lisinopriL (PRINIVIL, ZESTRIL) 10 mg tablet Take 1 Tablet by mouth daily. 3/2/22  Yes Nigel Sifuentes MD   metoprolol succinate (TOPROL-XL) 100 mg tablet Take 1 Tablet by mouth daily. 2/16/22  Yes Nigel Sifuentes MD   tamsulosin (FLOMAX) 0.4 mg capsule Take 0.4 mg by mouth daily. Yes Provider, Historical   cpap machine kit by Does Not Apply route.    Yes Provider, Historical   cinnamon bark (Cinnamon) 500 mg cap Take 1,000 mg by mouth two (2) times a day. Yes Provider, Historical   MOMETASONE FUROATE, BULK, by Does Not Apply route. Topical on scapl daily. Yes Provider, Historical   atorvastatin (LIPITOR) 20 mg tablet Take 1 Tablet by mouth daily. 7/28/21  Yes Maria Antonia Heck MD   aspirin delayed-release 81 mg tablet Take 81 mg by mouth every other day. Yes Provider, Historical   nitroglycerin (NITROSTAT) 0.4 mg SL tablet 0.4 mg by SubLINGual route every five (5) minutes as needed for Chest Pain. Up to 3 doses. Yes Provider, Historical   docosahexaenoic acid/epa (FISH OIL PO) Take 300 mg by mouth two (2) times a day. Yes Provider, Historical   magnesium oxide (MAG-OX) 400 mg tablet Take 400 mg by mouth daily. Yes Provider, Historical   cetirizine HCl (ALLER-ABRAN PO) Take 1 Tablet by mouth daily.     Provider, Historical       Allergies  No Known Allergies    Past Medical History:   Diagnosis Date    CAD (coronary artery disease)     NSTEMI ---> Cath 6/13 - LAD 99% ---> ROQUE to LAD     Dyslipidemia     Factor 5 Leiden mutation, heterozygous ([facility]     H/O carpal tunnel repair     HTN (hypertension)     Prostate CA ([facility]     radiation treatments        Past Surgical History:   Procedure Laterality Date    HX CORONARY STENT PLACEMENT  2013    HX OPEN REDUCTION INTERNAL FIXATION Left 05/2021    left patella    ID CARDIAC SURG PROCEDURE UNLIST         Family History   Problem Relation Age of Onset    Cancer Mother     Heart Disease Father        Social History  Social History     Socioeconomic History    Marital status:      Spouse name: Not on file    Number of children: Not on file    Years of education: Not on file    Highest education level: Not on file   Occupational History    Not on file   Tobacco Use    Smoking status: Never Smoker    Smokeless tobacco: Never Used   Substance and Sexual Activity    Alcohol use: Not Currently    Drug use: Never    Sexual activity: Not Currently   Other Topics Concern    Not on file   Social History Narrative    Not on file     Social Determinants of Health     Financial Resource Strain:     Difficulty of Paying Living Expenses: Not on file   Food Insecurity:     Worried About Running Out of Food in the Last Year: Not on file    Aakash of Food in the Last Year: Not on file   Transportation Needs:     Lack of Transportation (Medical): Not on file    Lack of Transportation (Non-Medical): Not on file   Physical Activity:     Days of Exercise per Week: Not on file    Minutes of Exercise per Session: Not on file   Stress:     Feeling of Stress : Not on file   Social Connections:     Frequency of Communication with Friends and Family: Not on file    Frequency of Social Gatherings with Friends and Family: Not on file    Attends Confucianist Services: Not on file    Active Member of [address] [place] [address] or Organizations: Not on file    Attends Club or Organization Meetings: Not on file    Marital Status: Not on file   Intimate Partner Violence:     Fear of Current or Ex-Partner: Not on file    Emotionally Abused: Not on file    Physically Abused: Not on file    Sexually Abused: Not on file   Housing Stability:     Unable to Pay for Housing in the Last Year: Not on file    Number of [place] in the Last Year: Not on file    Unstable Housing in the Last Year: Not on file       Alcohol history: Rarely  Smoking history: Non-smoker  Illicit drug history: Not at all    Living arrangement: patient lives alone. Ambulates: Independently     Review of Systems:   Review of Systems   Constitutional: Negative for chills and fever. HENT: Negative for congestion and sore throat. Eyes: Negative for discharge and redness. Respiratory: Negative for cough and shortness of breath. Cardiovascular: Negative for chest pain and palpitations. Gastrointestinal: Negative for diarrhea and vomiting. Endocrine: Negative for polydipsia and polyuria. Genitourinary: Negative for dysuria and hematuria. Skin: Negative for rash and wound. Neurological: Negative for weakness and numbness. Physical Exam  Visit Vitals  /68 (BP 1 Location: Right upper arm, BP Patient Position: At rest)   Pulse 60   Temp 97.6 °F (36.4 °C)   Resp 16   Ht 5' 11\" (1.803 m)   Wt 205 lb (93 kg)   SpO2 92%   BMI 28.59 kg/m²       O2 Device: None (Room air)   General:  No acute distress. Alert. Cooperative. Head: Normocephalic. Atraumatic. Eyes:              Conjunctiva pink. Sclera white. Throat: Mucosa pink. Moist mucous membranes. Neck: Supple. Normal ROM. No stiffness. Respiratory: CTAB. No w/r/r/c.   Cardiovascular: RRR. Normal S1,S2. No m/r/g. Pulses 2+ throughout. GI: + bowel sounds. Nontender. No rebound tenderness or guarding. Nondistended. Extremities:  2+ RLE edema, 1+ LLE edema. No tenderness, erythema or warmth to RLE. Distal pulses intact. Musculoskeletal: Full ROM in all extremities. Skin: Warm, dry. No rashes. Neuro: A&Ox 3. CN II-XII grossly intact. Strength 5/5 in all extremities. Laboratory Data  Recent Results (from the past 8 hour(s))   CBC WITH AUTOMATED DIFF    Collection Time: 06/27/22  7:15 PM   Result Value Ref Range    WBC 6.4 4.1 - 11.1 K/uL    RBC 4.97 4.10 - 5.70 M/uL    HGB 13.8 12.1 - 17.0 g/dL    HCT 41.9 36.6 - 50.3 %    MCV 84.3 80.0 - 99.0 FL    MCH 27.8 26.0 - 34.0 PG    MCHC 32.9 30.0 - 36.5 g/dL    RDW 14.5 11.5 - 14.5 %    PLATELET 576 555 - 241 K/uL    MPV 10.0 8.9 - 12.9 FL    NRBC 0.0 0  WBC    ABSOLUTE NRBC 0.00 0.00 - 0.01 K/uL    NEUTROPHILS 67 32 - 75 %    LYMPHOCYTES 18 12 - 49 %    MONOCYTES 10 5 - 13 %    EOSINOPHILS 5 0 - 7 %    BASOPHILS 1 0 - 1 %    IMMATURE GRANULOCYTES 0 0.0 - 0.5 %    ABS. NEUTROPHILS 4.3 1.8 - 8.0 K/UL    ABS. LYMPHOCYTES 1.1 0.8 - 3.5 K/UL    ABS. MONOCYTES 0.6 0.0 - 1.0 K/UL    ABS. EOSINOPHILS 0.3 0.0 - 0.4 K/UL    ABS. BASOPHILS 0.1 0.0 - 0.1 K/UL    ABS. IMM. GRANS. 0.0 0.00 - 0.04 K/UL    DF AUTOMATED     METABOLIC PANEL, BASIC    Collection Time: 06/27/22  7:15 PM   Result Value Ref Range    Sodium 141 136 - 145 mmol/L    Potassium 4.0 3.5 - 5.1 mmol/L    Chloride 105 98 - 107 mmol/L    CO2 24 22 - 29 mmol/L    Anion gap 12 5 - 15 mmol/L    Glucose 172 (H) 65 - 100 mg/dL    BUN 17 8 - 23 MG/DL    Creatinine 0.78 0.70 - 1.20 MG/DL    BUN/Creatinine ratio 22 (H) 12 - 20      GFR est AA >60 >60 ml/min/1.73m2    GFR est non-AA >60 >60 ml/min/1.73m2    Calcium 9.2 8.8 - 10.2 MG/DL   POC TROPONIN-I    Collection Time: 06/27/22  7:22 PM   Result Value Ref Range    Troponin-I (POC) <0.04 0.00 - 0.08 ng/mL   TROPONIN-HIGH SENSITIVITY    Collection Time: 06/28/22 12:34 AM   Result Value Ref Range    Troponin-High Sensitivity 9 0 - 76 ng/L   METABOLIC PANEL, BASIC    Collection Time: 06/28/22 12:34 AM   Result Value Ref Range    Sodium 141 136 - 145 mmol/L    Potassium 4.0 3.5 - 5.1 mmol/L    Chloride 107 97 - 108 mmol/L    CO2 27 21 - 32 mmol/L    Anion gap 7 5 - 15 mmol/L    Glucose 107 (H) 65 - 100 mg/dL    BUN 16 6 - 20 MG/DL    Creatinine 0.75 0.70 - 1.30 MG/DL    BUN/Creatinine ratio 21 (H) 12 - 20      GFR est AA >60 >60 ml/min/1.73m2    GFR est non-AA >60 >60 ml/min/1.73m2    Calcium 9.1 8.5 - 10.1 MG/DL   MAGNESIUM    Collection Time: 06/28/22 12:34 AM   Result Value Ref Range    Magnesium 1.8 1.6 - 2.4 mg/dL   CBC W/O DIFF    Collection Time: 06/28/22 12:34 AM   Result Value Ref Range    WBC 5.8 4.1 - 11.1 K/uL    RBC 4.86 4.10 - 5.70 M/uL    HGB 13.6 12.1 - 17.0 g/dL    HCT 41.5 36.6 - 50.3 %    MCV 85.4 80.0 - 99.0 FL    MCH 28.0 26.0 - 34.0 PG    MCHC 32.8 30.0 - 36.5 g/dL    RDW 14.3 11.5 - 14.5 %    PLATELET 322 006 - 699 K/uL    MPV 10.5 8.9 - 12.9 FL    NRBC 0.0 0  WBC    ABSOLUTE NRBC 0.00 0.00 - 0.01 K/uL   LACTIC ACID    Collection Time: 06/28/22 12:40 AM   Result Value Ref Range    Lactic acid 1.1 0.4 - 2.0 MMOL/L   SAMPLES BEING HELD    Collection Time: 06/28/22 12:48 AM   Result Value Ref Range    SAMPLES BEING HELD [id number]     COMMENT        Add-on orders for these samples will be processed based on acceptable specimen integrity and analyte stability, which may vary by analyte. Imaging  CXR Results  (Last 48 hours)               06/1935  XR CHEST PA LAT Final result    Impression:  Clear lungs. Narrative:  PA AND LATERAL CHEST RADIOGRAPHS: 6/27/2022 7:36 PM       INDICATION: Chest pain. COMPARISON: None. TECHNIQUE: Frontal and lateral radiographs of the chest.       FINDINGS:   The lungs are clear. The central airways are patent. The heart size is normal.   No pneumothorax or pleural effusion. CT Results  (Last 48 hours)    None            Assessment and Plan     Lillie Alva is a 81 y.o. male with a PMHx of  CAD (s/p stent x1 in 2013), T2DM, HTN, HLD, GERD, prostate cancer who is admitted for angina pectoris. Angina pectoris in s/o CAD s/p stent: Patient with significant cardiac history, presents with chest pain relieved by nitro. Had stent placed in LAD in 2013. Follows with Dr. Bernhard Oppenheim. Troponin negative at OSH ED, EKG unchanged from prior. Given cardiac history, will admit for further cardiac work-up. - Admit to telemetry  - Monitor vitals per unit protocol  - O2 prn, wean as tolerated  -Continue home ASA 81 mg every other day  -Continue home Lipitor 20 mg daily  -Nitro-Bid as needed  - Daily CBC, BMP, mag  - F/U repeat troponin  - Consult cardiology    RLE edema: Patient says this is something he recently noticed. No warmth or erythema to the region. Patient not on anticoagulation at home. -LE duplex    Hypertension: POA /87.  - Continuing home medications of amlodipine 10 mg daily, lisinopril 10 mg daily, metoprolol 100 mg daily. - Will continue to monitor at this time and readjust as BP's trend.      Diabetes Mellitus T2:  Lab Results   Component Value Date/Time    Hemoglobin A1c 6.6 (H) 03/02/2022 12:00 AM   - Holding home milligrams daily. -Monitor blood sugar on daily labs     Hyperlipidemia:   Lab Results   Component Value Date/Time    Cholesterol, total 128 03/02/2022 12:00 AM    HDL Cholesterol 48 03/02/2022 12:00 AM    LDL, calculated 57 03/02/2022 12:00 AM    VLDL, calculated 23 03/02/2022 12:00 AM    Triglyceride 134 03/02/2022 12:00 AM   -Continue home Lipitor 20 mg daily    Hx of Prostate Cancer: s/p radiation at [address] in [place].  -Continue outpatient follow-up    GERD: Chronic.  -Continue home Protonix 40 daily    Seasonal allergies: Chronic.  -Continue home cetirizine 10 mg daily    BPH: Chronic.  -Continue home Flomax 0.4 mg daily      FEN/GI - NPO. Activity - Ambulate with assistance  DVT prophylaxis - Lovenox  GI prophylaxis - Protonix  Fall prophylaxis - Not indicated at this time. Disposition - Admit to Telemetry. Plan to d/c to TBD. Consulting PT/OT/CM  Code Status - Full, discussed with patient / caregivers. Next of Kin Name and Contact Latanya Ivy (Daughter)   [phone]    Patient Marcy Fung will be discussed Dr. Leonora Denny.      2:42 AM, 06/28/22  Ted Ron MD  Family Medicine Resident       For Billing    Chief Complaint   Patient presents with   Tee Chest Pain       Hospital Problems  Date Reviewed: 1/12/2022          Codes Class Noted POA    * (Principal) [facility] p[facility]is ([facility] ICD-10-CM: I20.9  ICD-9-CM: 413.9  6/27/2022 Unknown        Diabetes mellitus type 2, diet-controlled ([facility] ICD-10-CM: E11.9  ICD-9-CM: 250.00  9/1/2021 Yes        HTN (hypertension) ICD-10-CM: I10  ICD-9-CM: 401.9  Unknown Yes        Dyslipidemia ICD-10-CM: E78.5  ICD-9-CM: 272.4  Unknown Yes        GERD (gastroesophageal reflux disease) ICD-10-CM: K21.9  ICD-9-CM: 530.81  Unknown Yes        BPH (benign prostatic hyperplasia) ICD-10-CM: N40.0  ICD-9-CM: 600.00  7/26/2021 Yes        Prostate CA ([facility] ICD-10-CM: C61  ICD-9-CM: 039  Unknown Yes    Overview Addendum 7/26/2021 9:08 PM by Rosalee Gallegos MD     Seeing [facility], last was Dr. 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

## 2022-06-28 NOTE — PROGRESS NOTES
1930 Bedside shift change report given to Chelsie (oncoming nurse) by Gab Mock RN  (offgoing nurse). Report included the following information SBAR, Intake/Output, MAR, Recent Results and Cardiac Rhythm NSR.     0700 Bedside shift change report given to [facility] MELVA Edwards (oncoming nurse) by Chelsie (offgoing nurse). Report included the following information SBAR, Intake/Output, MAR, Recent Results and Cardiac Rhythm NSR. This patient was assisted with Intentional Toileting every 2 hours during this shift as appropriate. Documentation of ambulation and output reflected on Flowsheet as appropriate. Purposeful hourly rounding was completed using AIDET and 5Ps. Outcomes of PHR documented as they occurred. Bed alarm in use as appropriate. Dual Suction and ambubag in place.

## 2022-06-28 NOTE — PROGRESS NOTES
OCCUPATIONAL THERAPY EVALUATION/DISCHARGE  Patient: Radha Gimenez (61 y.o. male)  Date: 6/28/2022  Primary Diagnosis: Angina pectoris ([facility] [address] [I20.9]       Precautions: fall       ASSESSMENT  Based on the objective data described below, the patient presents near baseline in regards to functional abilities following admission for chest pain and R LE edema. Patient had B LE duplex to r/o DVT that was negative. Patient was received in bed alert, oriented x4, talkative, and agreeable to activity. Patient lives alone but has local support from his daughter and son-in-law. Patient performed transfers with SBA with higher level balance impairment noted. Patient engaged in ADLs with good tolerance. Education provided regarding energy conservation techniques, safe trasnfer techniques, and home safety. Would recommend home health at discharge and patient is considering. Current Level of Function (ADLs/self-care): Patient required SBA for functional mobility. Patient is independent to mod I level for ADLs. Functional Outcome Measure: The patient scored 80/100 on the Barthel Index outcome measure. PLAN :  Recommendation for discharge: (in order for the patient to meet his/her long term goals)  Occupational therapy at least 2 days/week in the home     This discharge recommendation:  Has been made in collaboration with the attending provider and/or case management    IF patient discharges home will need the following DME: none       SUBJECTIVE:   Patient stated I do pretty well at home.     OBJECTIVE DATA SUMMARY:   HISTORY:   Past Medical History:   Diagnosis Date    CAD (coronary artery disease)     NSTEMI ---> Cath 6/13 - LAD 99% ---> ROQUE to LAD     Dyslipidemia     Factor 5 Leiden mutation, heterozygous ([facility] [address]     H/O carpal tunnel repair     HTN (hypertension)     Prostate CA ([facility] [address]     radiation treatments      Past Surgical History:   Procedure Laterality Date    HX CORONARY STENT PLACEMENT 2013    HX OPEN REDUCTION INTERNAL FIXATION Left 05/2021    left patella    [place] CARDIAC SURG PROCEDURE UNLIST         Prior Level of Function/Environment/Context: Patient lives alone. Expanded or extensive additional review of patient history:   Home Situation  Home Environment: Private residence  # Steps to Enter: 3  Rails to Enter: Yes  Hand Rails : Bilateral  One/Two Story Residence: One story  Living Alone: Yes  Support Systems: Child(karen)  Patient Expects to be Discharged to[de-identified] Home  Current DME Used/Available at Home: Cane, straight,Walker, rolling,Wheelchair,Shower chair,Commode, bedside    Hand dominance: Right    EXAMINATION OF PERFORMANCE DEFICITS:  Cognitive/Behavioral Status:  Neurologic State: Alert  Orientation Level: Oriented X4  Cognition: Follows commands  Perception: Appears intact  Perseveration: No perseveration noted  Safety/Judgement: Awareness of environment    Skin: Intact in the uppers    Edema: None noted in the uppers    Hearing: Auditory  Auditory Impairment: Hard of hearing, bilateral,Hearing aid(s)  Hearing Aids/Status: Not in hospital    Vision/Perceptual:    Tracking: Able to track stimulus in all quadrants w/o difficulty    Diplopia: No    Acuity: Within Defined Limits       Range of Motion:  AROM: Within functional limits  PROM: Within functional limits    Strength:  WDL in the uppers     Coordination:  Fine Motor Skills-Upper: Left Intact; Right Intact    Gross Motor Skills-Upper: Left Intact; Right Intact    Tone & Sensation:  Tone: Normal  Sensation:  (denies changes)    Balance:  Sitting: Intact  Standing: Impaired  Standing - Static: Good;Fair (improved with use of shoes)  Standing - Dynamic : Fair    Functional Mobility and Transfers for ADLs:  Bed Mobility:  Supine to Sit: Stand-by assistance; Additional time    Transfers:  Sit to Stand: Stand-by assistance  Stand to Sit: Stand-by assistance    ADL Assessment:  Feeding: Independent    Oral Facial Hygiene/Grooming: Independent    Bathing: Modified independent    Upper Body Dressing: Independent    Lower Body Dressing: Modified independent    Toileting: Modified independent     Cognitive Retraining  Safety/Judgement: Awareness of environment    Functional Measure:    Barthel Index:  Bathin  Bladder: 10  Bowels: 10  Groomin  Dressing: 10  Feeding: 10  Mobility: 10  Stairs: 5  Toilet Use: 5  Transfer (Bed to Chair and Back): 10  Total: 80/100      The Barthel ADL Index: Guidelines  1. The index should be used as a record of what a patient does, not as a record of what a patient could do. 2. The main aim is to establish degree of independence from any help, physical or verbal, however minor and for whatever reason. 3. The need for supervision renders the patient not independent. 4. A patient's performance should be established using the best available evidence. Asking the patient, friends/relatives and nurses are the usual sources, but direct observation and common sense are also important. However direct testing is not needed. 5. Usually the patient's performance over the preceding 24-48 hours is important, but occasionally longer periods will be relevant. 6. Middle categories imply that the patient supplies over 50 per cent of the effort. 7. Use of aids to be independent is allowed. Score Interpretation (from [address] [address])    Independent   60-79 Minimally independent   40-59 Partially dependent   20-39 Very dependent   <20 Totally dependent     -Liseth Singer., Barthel, D.W. (1965). Functional evaluation: the Barthel Index. [address] [facility] ([address] (). The Barthel activities of daily living index: self-reporting versus actual performance in the old (> or = 75 years). Journal of [address] [place] [address] 45(7), [facility], J.JYeniferMYeniferF, Dmitri Andrew., [place]. (1999).  Measuring the change in disability after inpatient rehabilitation; comparison of the responsiveness of the Barthel Index and Functional [place] Measure. Journal of Neurology, Neurosurgery, and Psychiatry, 66(4), 443-100. FAHAD Dimas, NADJA Yanez, & Gisselle Sierra M.A. (2004) Assessment of post-stroke quality of life in cost-effectiveness studies: The usefulness of the Barthel Index and the EuroQoL-5D. Quality of Life Research, 15, 202-10       Occupational Therapy Evaluation Charge Determination   History Examination Decision-Making   LOW Complexity : Brief history review  LOW Complexity : 1-3 performance deficits relating to physical, cognitive , or psychosocial skils that result in activity limitations and / or participation restrictions  LOW Complexity : No comorbidities that affect functional and no verbal or physical assistance needed to complete eval tasks       Based on the above components, the patient evaluation is determined to be of the following complexity level: LOW   Activity Tolerance:   Good    After treatment patient left in no apparent distress:    Sitting in chair, Call bell within reach, Bed / chair alarm activated and Caregiver / family present    COMMUNICATION/EDUCATION:   The patients plan of care was discussed with: Physical therapist, Registered nurse and patient. .     Thank you for this referral.  Melina Boland OTR/L  Time Calculation: 37 mins

## 2022-06-28 NOTE — ED PROVIDER NOTES
The history is provided by the patient. Chest Pain   This is a new problem. The current episode started 1 to 2 hours ago. The problem has been resolved. The pain is associated with normal activity. The pain is present in the substernal region. The pain is moderate. The quality of the pain is described as sharp. The pain does not radiate. Pertinent negatives include no cough, no diaphoresis, no fever, no nausea, no near-syncope, no shortness of breath and no vomiting. He has tried nitroglycerin for the symptoms. The treatment provided significant relief. Risk factors include cardiac disease and hypertension. Procedural history includes cardiac catheterization, cardiac stents and CABG.        Past Medical History:   Diagnosis Date    CAD (coronary artery disease)     NSTEMI ---> Cath 6/13 - LAD 99% ---> ROQUE to LAD     Dyslipidemia     Factor 5 Leiden mutation, heterozygous ([facility] [address]     H/O carpal tunnel repair     HTN (hypertension)     Prostate CA ([facility] [address]     radiation treatments        Past Surgical History:   Procedure Laterality Date    HX CORONARY STENT PLACEMENT  2013    HX OPEN REDUCTION INTERNAL FIXATION Left 05/2021    left patella    [place] CARDIAC SURG PROCEDURE UNLIST           Family History:   Problem Relation Age of Onset    Cancer Mother     Heart Disease Father        Social History     Socioeconomic History    Marital status:      Spouse name: Not on file    Number of children: Not on file    Years of education: Not on file    Highest education level: Not on file   Occupational History    Not on file   Tobacco Use    Smoking status: Never Smoker    Smokeless tobacco: Never Used   Substance and Sexual Activity    Alcohol use: Not Currently    Drug use: Never    Sexual activity: Not Currently   Other Topics Concern    Not on file   Social History Narrative    Not on file     Social Determinants of Health     Financial Resource Strain:     Difficulty of Paying Living Expenses: Not on file   Food Insecurity:     Worried About [address] in the Last Year: Not on file    Aakash of Food in the Last Year: Not on file   Transportation Needs:     Lack of Transportation (Medical): Not on file    Lack of Transportation (Non-Medical): Not on file   Physical Activity:     Days of Exercise per Week: Not on file    Minutes of Exercise per Session: Not on file   Stress:     Feeling of Stress : Not on file   Social Connections:     Frequency of Communication with Friends and Family: Not on file    Frequency of Social Gatherings with Friends and Family: Not on file    Attends Ep[employer] Services: Not on file    Active Member of [address] [place] [address] [employer] or Organizations: Not on file    Attends Club or Organization Meetings: Not on file    Marital Status: Not on file   Intimate Partner Violence:     Fear of Current or Ex-Partner: Not on file    Emotionally Abused: Not on file    Physically Abused: Not on file    Sexually Abused: Not on file   Housing Stability:     Unable to Pay for Housing in the Last Year: Not on file    Number of [place] in the Last Year: Not on file    Unstable Housing in the Last Year: Not on file         ALLERGIES: Patient has no known allergies. Review of Systems   Constitutional: Negative for diaphoresis and fever. Respiratory: Negative for cough and shortness of breath. Cardiovascular: Positive for chest pain. Negative for near-syncope. Gastrointestinal: Negative for nausea and vomiting. All other systems reviewed and are negative. Vitals:    06/27/22 1945 06/27/22 2000 06/27/22 2014 06/27/22 2015   BP: 123/86 120/66 120/66 122/63   Pulse: 67 63 62 62   Resp: 14   19   Temp:       SpO2:  95%  93%   Weight:       Height:                Physical Exam  Vitals and nursing note reviewed. Constitutional:       General: He is not in acute distress. Appearance: He is well-developed. HENT:      Head: Normocephalic and atraumatic.    Eyes: Conjunctiva/sclera: Conjunctivae normal.      Pupils: Pupils are equal, round, and reactive to light. Cardiovascular:      Rate and Rhythm: Normal rate. Rhythm irregular. Pulmonary:      Effort: Pulmonary effort is normal.      Breath sounds: Normal breath sounds. Abdominal:      General: There is no distension. Palpations: Abdomen is soft. Tenderness: There is no abdominal tenderness. Musculoskeletal:         General: Normal range of motion. Cervical back: Normal range of motion. Right lower leg: Edema present. Left lower leg: Edema present. Skin:     General: Skin is warm and dry. Capillary Refill: Capillary refill takes less than 2 seconds. Neurological:      General: No focal deficit present. Mental Status: He is alert and oriented to person, place, and time. Psychiatric:         Mood and Affect: Mood normal.         Behavior: Behavior normal.          [facility]  ED Course as of 22 EKG at 7:06 PM shows normal sinus rhythm with a first-degree AV block, 72 bpm, premature atrial complexes, right bundle branch block, no STEMI. EKG is interpreted by Sigrid Rob MD  [IO]      ED Course User Index  [IO] Stacy Hallman MD       Procedures          MEDICAL DECISION MAKIN y.o. male presents with Chest Pain    Differential diagnosis includes but not limited to:  acute myocardial infarction, significant arrhythmia, unstable angina, esophageal perforation, pulmonary embolism, aortic dissection, pneumothorax, severe pneumonia, sepsis     LABORATORY TESTS:  Labs Reviewed   METABOLIC PANEL, BASIC - Abnormal; Notable for the following components:       Result Value    Glucose 172 (*)     BUN/Creatinine ratio 22 (*)     All other components within normal limits   CBC WITH AUTOMATED DIFF   POC TROPONIN-I       IMAGING RESULTS:  XR CHEST PA LAT   Final Result   Clear lungs.           MEDICATIONS GIVEN:  Medications   nitroglycerin (NITROBID) 2 % ointment 1 Inch (1 Inch Topical Given 6/27/22 2014)   aspirin chewable tablet 243 mg (243 mg Oral Given 6/27/22 1919)       CONSULTS:  Family Practice:  [facility] for Admission  8:50 PM    ED Room Number: C10/C10  Patient Name and age:  Andrew Buitrago 81 y.o.  male  Working Diagnosis:   1. Angina pectoris ([address]        COVID-19 Suspicion:  no  Sepsis present:  no  Reassessment needed: N/A  Code Status:  Full Code  Readmission: no  Isolation Requirements:  no  Recommended Level of Care:  telemetry  Department: [facility]  Other:  8:51 PM case discussed with Dr. Bailee Trilpett, cardiology, who agrees with admission for additional cardiac workup        Eugene Vasquez MD      Please note that this dictation was completed with [employer], the computer voice recognition software. Quite often unanticipated grammatical, syntax, homophones, and other interpretive errors are inadvertently transcribed by the computer software. Please disregard these errors. Please excuse any errors that have escaped final proofreading.

## 2022-06-28 NOTE — PROGRESS NOTES
06/28/22      Medicare pt has received, reviewed, and signed 1st  IM letter informing them of their right to appeal the discharge. Patient unable to sign document provider and nurse in room with patient. Letter was given to nurse to give to patient.

## 2022-06-28 NOTE — PROGRESS NOTES
Reason for Admission:  Angina pectoris                     RUR Score:    6%                 Plan for utilizing home health: To be determined        PCP: First and Last name:  Nadia Shanks MD     Name of Practice:    Are you a current patient: Yes/No: yes   Approximate date of last visit: a few months   Can you participate in a virtual visit with your PCP:  Yes                     Current Advanced Directive/Advance Care Plan: Full Code  None; his three adult children are his next of kin    Healthcare Decision Maker:   Click here to complete [address] including selection of the Healthcare Decision Maker Relationship (ie \"Primary\")                             Transition of Care Plan:                    Met with pt for d/c planning. PTA pt was independent with ADL's, was driving and used no DME. He has a w/c, RW, cane and a built-in shower bench at home. He lives by himself loretta 1 story house with 2-3 entry steps. Medications are from [facility] on [address] [place] [facility] and by mail. 1. Pt's family to transport him home at d/c  2. PT/OT following  3. Cardiology following--ECHO pending  4. Pt to f/u OP  5. CM following    Care Management Interventions  Mode of Transport at Discharge: Other (see comment)  Physical Therapy Consult: Yes  Occupational Therapy Consult: Yes  Support Systems: Child(karen)  Confirm Follow Up Transport: Family  Discharge Location  Patient Expects to be Discharged to[de-identified] Home with one kitty Mendes RN

## 2022-06-28 NOTE — CONSULTS
CARDIOLOGY CONSULTATION NOTE    Boom Atkinson MD,  [address], [place], [address] [facility]  Phone [phone]; Fax [phone]  Mobile [phone]   Voice Mail [phone]                               2022  7:02 PM  Primary Care Trav Zuñiga MD  :  [id number]   MRN:  [id number]     CC: Chest pain right-sided and sharp    Reason for consult:  Chest discomfort      Admission Diagnosis: Angina pectoris ([address] [I20.9]         ATTENTION:   This medical record was transcribed using an electronic medical records/speech recognition system. Although proofread, it may and can contain electronic, spelling and other errors. Corrections may be executed at a later time. Please feel free to contact us for any clarifications as needed. 20 minutes reviewing records and 12 minutes talking with patient. His family is present and answered their questions. Impression Plan/Recommendation   1. Chest discomfort  2. History of CAD with stenting in 2013  3. Hypertension  4. Dyslipidemia  5. Factor V Leiden mutation  6. Aortic stenosis, mild      Troponin 9, BUN 16, Cr 0.75     1. Repeat troponin, 9 on admission  2. Check TTE  3. EKG with first degree block, RBBB which are present on prev EKG's   4. Metoprolol on hold for bradycardia? Could resume at lower dose       Would plan on lexiscan Cardiolite for tomorrow. He is followed by Dr. Caro Monroe and last saw him on 2022. Esteban Plaza is a 81 y.o. male I am seeing for chest discomfort in the setting of negative troponins and right-sided pain. He has a past medical history significant for CAD with history of MI in 2013 s/p stenting, dyslipidemia, hypertension, and T2DM. Pt reports he was painting at his house yesterday and when he sat down experienced a sharp pain in R side of his chest.  It occurred a few seconds and then went away.   He took an aspirin and a nitro at home, then took a second dose of nitro about 5 min later and pain resolved. He describes the pain as substernal on moderate and soft sensation with no radiation. He has not had any pain since. Denies any associated SOB or palpitations. He does admit to Solvonics retention\" and will see swelling in his legs. Does not take a diuretic currently. He was using the computer at the time. never had similar pain to MI and believes it was all arm pain. Cardiac risk factors: dyslipidemia, diabetes mellitus, male gender, hypertension. No Known Allergies      Past Medical History:   Diagnosis Date    CAD (coronary artery disease)     NSTEMI ---> Cath 6/13 - LAD 99% ---> ROQUE to LAD     Dyslipidemia     Factor 5 Leiden mutation, heterozygous ([facility]     H/O carpal tunnel repair     HTN (hypertension)     Prostate CA ([facility] [facility]     radiation treatments         Past Surgical History:   Procedure Laterality Date    HX CORONARY STENT PLACEMENT  2013    HX OPEN REDUCTION INTERNAL FIXATION Left 05/2021    left patella    CT CARDIAC SURG PROCEDURE UNLIST          . Home Medications:  Prior to Admission Medications   Prescriptions Last Dose Informant Patient Reported? Taking? MOMETASONE FUROATE, BULK, 6/28/2022 at Unknown time  Yes Yes   Sig: by Does Not Apply route. Topical on scapl daily. amLODIPine (NORVASC) 10 mg tablet 6/28/2022 at Unknown time  No Yes   Sig: Take 1 Tablet by mouth daily. aspirin delayed-release 81 mg tablet 6/28/2022 at Unknown time  Yes Yes   Sig: Take 81 mg by mouth every other day. atorvastatin (LIPITOR) 20 mg tablet 6/28/2022 at Unknown time  No Yes   Sig: Take 1 Tablet by mouth daily. cetirizine HCl (ALLER-ABRAN PO) Unknown at Unknown time  Yes No   Sig: Take 1 Tablet by mouth daily. cinnamon bark (Cinnamon) 500 mg cap 6/28/2022 at Unknown time  Yes Yes   Sig: Take 1,000 mg by mouth two (2) times a day. cpap machine kit 6/27/2022 at Unknown time  Yes Yes   Sig: by Does Not Apply route.    docosahexaenoic acid/epa (FISH OIL PO) 2022 at Unknown time  Yes Yes   Sig: Take 300 mg by mouth two (2) times a day. lisinopriL (PRINIVIL, ZESTRIL) 10 mg tablet 2022 at Unknown time  No Yes   Sig: Take 1 Tablet by mouth daily. magnesium oxide (MAG-OX) 400 mg tablet 2022 at Unknown time  Yes Yes   Sig: Take 400 mg by mouth daily. metFORMIN ER (GLUCOPHAGE XR) 500 mg tablet 2022 at Unknown time  No Yes   Sig: Take 1 Tablet by mouth daily (with dinner). metoprolol succinate (TOPROL-XL) 100 mg tablet 2022 at Unknown time  No Yes   Sig: Take 1 Tablet by mouth daily. nitroglycerin (NITROSTAT) 0.4 mg SL tablet 2022 at Unknown time  Yes Yes   Si.4 mg by SubLINGual route every five (5) minutes as needed for Chest Pain. Up to 3 doses. pantoprazole (PROTONIX) 40 mg tablet 2022 at Unknown time  No Yes   Sig: Take 1 Tablet by mouth daily. tamsulosin (FLOMAX) 0.4 mg capsule 2022 at Unknown time  Yes Yes   Sig: Take 0.4 mg by mouth daily.       Facility-Administered Medications: None       Hospital Medications:  Current Facility-Administered Medications   Medication Dose Route Frequency    [Held by provider] enoxaparin (LOVENOX) injection 40 mg  40 mg SubCUTAneous DAILY    amLODIPine (NORVASC) tablet 10 mg  10 mg Oral DAILY    aspirin delayed-release tablet 81 mg  81 mg Oral EVERY OTHER DAY    atorvastatin (LIPITOR) tablet 20 mg  20 mg Oral DAILY    cetirizine (ZYRTEC) tablet 10 mg  10 mg Oral DAILY    magnesium oxide (MAG-OX) tablet 400 mg  400 mg Oral DAILY    lisinopriL (PRINIVIL, ZESTRIL) tablet 10 mg  10 mg Oral DAILY    [Held by provider] metoprolol succinate (TOPROL-XL) XL tablet 100 mg  100 mg Oral DAILY    pantoprazole (PROTONIX) tablet 40 mg  40 mg Oral ACB    tamsulosin (FLOMAX) capsule 0.4 mg  0.4 mg Oral DAILY    nitroglycerin (NITROBID) 2 % ointment 1 Inch  1 Inch Topical BID PRN          OBJECTIVE       Laboratory and Imaging have been reviewed and are notable for      ECG:  Date: 6/28/2022 sinus rhythm with right bundle branch block first-degree heart block LAD occasional PACs with aberrant conduction      Diagnostic Tests:     No results for input(s): CPK, CKMB, TROIQ in the last 72 hours. No lab exists for component: CKQMB, CPKMB  Recent Labs     06/28/22  0034 06/27/22  1915    141   K 4.0 4.0   CO2 27 24   BUN 16 17   CREA 0.75 0.78   * 172*   MG 1.8  --    WBC 5.8 6.4   HGB 13.6 13.8   HCT 41.5 41.9    185         Cardiac work up to date:    1) cardiac catheterization  6/13: LAD 99% ROQUE to LAD    2) echocardiogram  1/12/2022: EF 55-60%, mild MR, left atrial argument, right atrial enlargement, mild ascending aorta 3.9 cm    3) stress test  Exercise Cardiolite (2/19)-7 METS no ischemia normal EF    4) carotid studies  (1/19)-less than 50% ICA stenosis bilateral    5) cholesterol  3/2/2022:  HDL 48, LDL 57,                      Social History:  Social History     Tobacco Use    Smoking status: Never Smoker    Smokeless tobacco: Never Used   Substance Use Topics    Alcohol use: Not Currently       Family History:  Family History   Problem Relation Age of Onset    Cancer Mother     Heart Disease Father        Review of Symptoms:  A comprehensive review of systems was negative except for that written in the HPI. Physical Exam:      Visit Vitals  BP (!) 161/72 (BP 1 Location: Right arm, BP Patient Position: At rest)   Pulse 61   Temp 97.4 °F (36.3 °C)   Resp 15   Ht 5' 11\" (1.803 m)   Wt 93 kg (205 lb)   SpO2 96%   BMI 28.59 kg/m²     General Appearance:  Well developed, well nourished,alert and oriented x 3, and individual in no acute distress.    Ears/Nose/Mouth/Throat:   Hearing grossly normal.Normal oral mucosa,no scleral icterus     Neck: Supple no JVD or bruits,no cervical lymphadenopathy left carotid bruit   Chest:   Lungs clear to auscultation bilaterally,no rales rhonchi or wheezing   Cardiovascular:  Regular rate and rhythm,   Abdomen: Soft, non-tender, bowel sounds are active. No abdominal bruits   Extremities: No edema bilaterally. Pulses detected, no varicosities   Skin: Warm and dry. No bruising  Neuro  Moves all extermities and neurologically intact                                                       I have discussed the diagnosis with the patient and the intended plan as seen in the above orders. Questions were answered concerning future plans. I have discussed medication side effects and warnings with the patient as well. Lee Robins is in agreement to the plan listed above and wishes to proceed. he  was instructed not to smoke, eat heart healthy diet  and to exercise. Thank you for the consult and the opportunity to be involved in the care of Lee Robins.         Claudius Klinefelter, MD

## 2022-06-28 NOTE — PROGRESS NOTES
Problem: Mobility Impaired (Adult and Pediatric)  Goal: *Acute Goals and Plan of Care (Insert Text)  Description: FUNCTIONAL STATUS PRIOR TO ADMISSION: Patient was independent and active without use of DME. Was painting his deck prior to admission. Has had two falls in the past year that he reports is related to his stairs that were replaced. One fall resulted in injury to L patella requiring intervention and second fall resulted in hitting head but no LOC. He has not worked with therapy in the past [facility] or Outpatient per patient    [address]: The patient lived alone with son-in-law and daughter living nearby to provide assistance as needed. Physical Therapy Goals  Initiated 6/28/2022  1. Patient will move from supine to sit and sit to supine  in bed with independence within 7 day(s). 2.  Patient will transfer from bed to chair and chair to bed with independence using the least restrictive device within 7 day(s). 3.  Patient will perform sit to stand with independence within 7 day(s). 4.  Patient will ambulate with modified independence for 200 feet with the least restrictive device within 7 day(s). 5.  Patient will ascend/descend 3 stairs with  handrail(s) with supervision/set-up within 7 day(s). Outcome: Progressing Towards Goal     PHYSICAL THERAPY EVALUATION  Patient: Felix Dale (20 y.o. male)  Date: 6/28/2022  Primary Diagnosis: Angina pectoris ([facility] [address] [I20.9]        Precautions: Fall       ASSESSMENT  Patient admitted with chest pain and R LE edema, troponin and DVT duplex negative. Based on the objective data described below, the patient presents with impaired balance, mild deconditioning, and impaired safety awareness. Able to ambulate in hallway with contact guard assist to min assist with path deviations especially with distraction. Unsteady with increased ankle activation in standing without shoes that is improved somewhat with his shoes with inserts.  Has had two falls in past year that he reports were related to issues with his stairs that resulted in a L knee injury and hitting his head. He has not had therapy in the past but would benefit from HHPT for balance training and fall prevention as he is unsteady and a high risk for falls. He stays fairly active and appears somewhat resistant to this recommendation but agreeable to consider with encouragement. Also would recommend increased assist from family as able. Current Level of Function Impacting Discharge (mobility/balance): CGA to min A for balance with gait in hallway    Other factors to consider for discharge: lives alone, prior falls     Patient will benefit from skilled therapy intervention to address the above noted impairments. PLAN :  Recommendations and Planned Interventions: bed mobility training, transfer training, gait training, therapeutic exercises, neuromuscular re-education, patient and family training/education and therapeutic activities      Frequency/Duration: Patient will be followed by physical therapy:  4 times a week to address goals. Recommendation for discharge: (in order for the patient to meet his/her long term goals)  Physical therapy at least 2 days/week in the home AND ensure assist and/or supervision for safety with functional mobility as needed    This discharge recommendation:  Has not yet been discussed the attending provider and/or case management    IF patient discharges home will need the following DME: patient owns DME required for discharge       SUBJECTIVE:   Patient stated I like positive people.     OBJECTIVE DATA SUMMARY:   HISTORY:    Past Medical History:   Diagnosis Date    CAD (coronary artery disease)     NSTEMI ---> Cath 6/13 - LAD 99% ---> ROQUE to LAD     Dyslipidemia     Factor 5 Leiden mutation, heterozygous ([facility] [address]     H/O carpal tunnel repair     HTN (hypertension)     Prostate CA ([facility] [address]     radiation treatments      Past Surgical History:   Procedure Laterality Date    HX CORONARY STENT PLACEMENT  2013    HX OPEN REDUCTION INTERNAL FIXATION Left 05/2021    left patella    CA CARDIAC SURG PROCEDURE UNLIST       Personal factors and/or comorbidities impacting plan of care: CAD, prostate CA    Home Situation  Home Environment: (P) Private residence  # Steps to Enter: (P) 3  Rails to Enter: (P) Yes  Hand Rails : (P) Bilateral  One/Two Story Residence: (P) One story  Living Alone: (P) Yes  Support Systems: (P) Child(karen)  Patient Expects to be Discharged to[de-identified] (P) Home  Current DME Used/Available at Home: (P) Cane, straight,Walker, rolling,Wheelchair,Shower chair,Commode, bedside    EXAMINATION/PRESENTATION/DECISION MAKING:   Critical Behavior:  Neurologic State: (P) Alert  Orientation Level: (P) Oriented X4  Cognition: (P) Follows commands  Safety/Judgement: (P) Awareness of environment  Hearing: Auditory  Auditory Impairment: Hard of hearing, bilateral,Hearing aid(s)  Hearing Aids/Status: Not in hospital  Range Of Motion:  AROM: Within functional limits     PROM: Within functional limits  Strength:    Strength: Generally decreased, functional   Tone & Sensation:   Tone: Normal   Sensation:  (denies changes)     Coordination:  Coordination: Generally decreased, functional  Vision:   Tracking: Able to track stimulus in all quadrants w/o difficulty  Diplopia: No  Acuity: Within Defined Limits  Functional Mobility:  Bed Mobility:     Supine to Sit: Stand-by assistance; Additional time        Transfers:  Sit to Stand: Stand-by assistance  Stand to Sit: Stand-by assistance  Balance:   Sitting: Intact  Standing: Impaired  Standing - Static: Good;Fair (improved with use of shoes)  Standing - Dynamic : Fair  Ambulation/Gait Training:  Distance (ft): 150 Feet (ft)  Ambulation - Level of Assistance: Contact guard assistance;Minimal assistance (gait belt)  Gait Description (WDL): Exceptions to WDL  Gait Abnormalities: Decreased step clearance; Path deviations  Base of Support: Center of gravity altered  Speed/Kayli: Fluctuations     Physical Therapy Evaluation Charge Determination   History Examination Presentation Decision-Making   LOW Complexity : Zero comorbidities / personal factors that will impact the outcome / POC LOW Complexity : 1-2 Standardized tests and measures addressing body structure, function, activity limitation and / or participation in recreation  LOW Complexity : Stable, uncomplicated  LOW Complexity : FOTO score of       Based on the above components, the patient evaluation is determined to be of the following complexity level: LOW     Pain Rating:  Did not interfere    Activity Tolerance:   Good    After treatment patient left in no apparent distress:   Sitting in chair, Call bell within reach and Bed / chair alarm activated    COMMUNICATION/EDUCATION:   The patients plan of care was discussed with: Occupational therapist and Registered nurse. Fall prevention education was provided and the patient/caregiver indicated understanding., Patient/family have participated as able in goal setting and plan of care. and Patient/family agree to work toward stated goals and plan of care.     Thank you for this referral.  Kerry Armstrong, PT   Time Calculation: 33 mins

## 2022-06-28 NOTE — PROGRESS NOTES
TRANSFER - IN REPORT:    [facility] Vaibhav Justice  being [facility] from [facility](unit) for routine progression of care      Report consisted of patients Situation, Background, Assessment and   Recommendations(SBAR). Information from the following report(s) SBAR, ED Summary, Intake/Output, Recent Results and Cardiac Rhythm NSR was reviewed with the receiving nurse. Opportunity for questions and clarification was provided. Assessment completed upon patients arrival to unit and care assumed. 2223 Patient not arrived to unit yet; no current ETA    0009 Patient arrived on unit. Patient runs loretta due to Type 1 AV block; set parameters for notification     0700 Bedside shift change report given to Boubacar Borges RN (oncoming nurse) by Wood Hood (offgoing nurse). Report included the following information SBAR, Intake/Output, MAR, Recent Results and Cardiac Rhythm NSR. This patient was assisted with Intentional Toileting every 2 hours during this shift as appropriate. Documentation of ambulation and output reflected on Flowsheet as appropriate. Purposeful hourly rounding was completed using AIDET and 5Ps. Outcomes of PHR documented as they occurred. Bed alarm in use as appropriate. Dual Suction and ambubag in place.

## 2022-06-28 NOTE — PROGRESS NOTES
Bedside shift change report given to [facility] (oncoming nurse) by Carmen Painting (offgoing nurse). Report included the following information SBAR, Kardex, ED Summary, Procedure Summary, Intake/Output, MAR, Recent Results and Cardiac Rhythm Sinus loretta type 1 av block. 1025 Per Cardiology, Pt may eat. Bedside shift change report given to Carmen Painting (oncoming nurse) by [facility] (offgoing nurse). Report included the following information SBAR, Kardex, ED Summary, Procedure Summary, Intake/Output, MAR, Recent Results and Cardiac Rhythm NSR.

## 2022-06-29 ENCOUNTER — APPOINTMENT (OUTPATIENT)
Dept: NON INVASIVE DIAGNOSTICS | Age: 87
DRG: 287 | End: 2022-06-29
Attending: SPECIALIST
Payer: MEDICARE

## 2022-06-29 ENCOUNTER — APPOINTMENT (OUTPATIENT)
Dept: NUCLEAR MEDICINE | Age: 87
DRG: 287 | End: 2022-06-29
Attending: SPECIALIST
Payer: MEDICARE

## 2022-06-29 DIAGNOSIS — I10 PRIMARY HYPERTENSION: ICD-10-CM

## 2022-06-29 DIAGNOSIS — I25.10 CORONARY ARTERY DISEASE INVOLVING NATIVE CORONARY ARTERY OF NATIVE HEART WITHOUT ANGINA PECTORIS: ICD-10-CM

## 2022-06-29 DIAGNOSIS — K21.9 GASTROESOPHAGEAL REFLUX DISEASE, UNSPECIFIED WHETHER ESOPHAGITIS PRESENT: ICD-10-CM

## 2022-06-29 DIAGNOSIS — E11.9 DIABETES MELLITUS TYPE 2, DIET-CONTROLLED (HCC): ICD-10-CM

## 2022-06-29 DIAGNOSIS — E78.5 DYSLIPIDEMIA: ICD-10-CM

## 2022-06-29 LAB
ANION GAP SERPL CALC-SCNC: 8 MMOL/L (ref 5–15)
BUN SERPL-MCNC: 14 MG/DL (ref 6–20)
BUN/CREAT SERPL: 18 (ref 12–20)
CALCIUM SERPL-MCNC: 8.9 MG/DL (ref 8.5–10.1)
CHLORIDE SERPL-SCNC: 106 MMOL/L (ref 97–108)
CO2 SERPL-SCNC: 25 MMOL/L (ref 21–32)
CREAT SERPL-MCNC: 0.77 MG/DL (ref 0.7–1.3)
ERYTHROCYTE [DISTWIDTH] IN BLOOD BY AUTOMATED COUNT: 14.1 % (ref 11.5–14.5)
GLUCOSE SERPL-MCNC: 136 MG/DL (ref 65–100)
HCT VFR BLD AUTO: 43.9 % (ref 36.6–50.3)
HGB BLD-MCNC: 14.5 G/DL (ref 12.1–17)
MAGNESIUM SERPL-MCNC: 1.9 MG/DL (ref 1.6–2.4)
MCH RBC QN AUTO: 27.9 PG (ref 26–34)
MCHC RBC AUTO-ENTMCNC: 33 G/DL (ref 30–36.5)
MCV RBC AUTO: 84.6 FL (ref 80–99)
NRBC # BLD: 0 K/UL (ref 0–0.01)
NRBC BLD-RTO: 0 PER 100 WBC
PLATELET # BLD AUTO: 188 K/UL (ref 150–400)
PMV BLD AUTO: 10.7 FL (ref 8.9–12.9)
POTASSIUM SERPL-SCNC: 3.6 MMOL/L (ref 3.5–5.1)
RBC # BLD AUTO: 5.19 M/UL (ref 4.1–5.7)
SODIUM SERPL-SCNC: 139 MMOL/L (ref 136–145)
WBC # BLD AUTO: 6 K/UL (ref 4.1–11.1)

## 2022-06-29 PROCEDURE — 74011250637 HC RX REV CODE- 250/637: Performed by: STUDENT IN AN ORGANIZED HEALTH CARE EDUCATION/TRAINING PROGRAM

## 2022-06-29 PROCEDURE — 65270000046 HC RM TELEMETRY

## 2022-06-29 PROCEDURE — 36415 COLL VENOUS BLD VENIPUNCTURE: CPT

## 2022-06-29 PROCEDURE — 80048 BASIC METABOLIC PNL TOTAL CA: CPT

## 2022-06-29 PROCEDURE — 85027 COMPLETE CBC AUTOMATED: CPT

## 2022-06-29 PROCEDURE — 83735 ASSAY OF MAGNESIUM: CPT

## 2022-06-29 PROCEDURE — 99238 HOSP IP/OBS DSCHRG MGMT 30/<: CPT | Performed by: FAMILY MEDICINE

## 2022-06-29 PROCEDURE — A9500 TC99M SESTAMIBI: HCPCS

## 2022-06-29 PROCEDURE — 74011250636 HC RX REV CODE- 250/636: Performed by: SPECIALIST

## 2022-06-29 PROCEDURE — 94760 N-INVAS EAR/PLS OXIMETRY 1: CPT

## 2022-06-29 RX ORDER — TETRAKIS(2-METHOXYISOBUTYLISOCYANIDE)COPPER(I) TETRAFLUOROBORATE 1 MG/ML
30 INJECTION, POWDER, LYOPHILIZED, FOR SOLUTION INTRAVENOUS
Status: COMPLETED | OUTPATIENT
Start: 2022-06-29 | End: 2022-06-29

## 2022-06-29 RX ORDER — TETRAKIS(2-METHOXYISOBUTYLISOCYANIDE)COPPER(I) TETRAFLUOROBORATE 1 MG/ML
11 INJECTION, POWDER, LYOPHILIZED, FOR SOLUTION INTRAVENOUS
Status: COMPLETED | OUTPATIENT
Start: 2022-06-29 | End: 2022-06-29

## 2022-06-29 RX ORDER — METOPROLOL SUCCINATE 50 MG/1
50 TABLET, EXTENDED RELEASE ORAL DAILY
Status: DISCONTINUED | OUTPATIENT
Start: 2022-06-29 | End: 2022-07-01 | Stop reason: HOSPADM

## 2022-06-29 RX ORDER — METOPROLOL SUCCINATE 100 MG/1
50 TABLET, EXTENDED RELEASE ORAL DAILY
Qty: 90 TABLET | Refills: 1 | Status: SHIPPED | OUTPATIENT
Start: 2022-06-29 | End: 2022-07-01

## 2022-06-29 RX ADMIN — AMLODIPINE BESYLATE 10 MG: 5 TABLET ORAL at 09:00

## 2022-06-29 RX ADMIN — TAMSULOSIN HYDROCHLORIDE 0.4 MG: 0.4 CAPSULE ORAL at 09:00

## 2022-06-29 RX ADMIN — CETIRIZINE HYDROCHLORIDE 10 MG: 10 TABLET, FILM COATED ORAL at 09:00

## 2022-06-29 RX ADMIN — PANTOPRAZOLE SODIUM 40 MG: 40 TABLET, DELAYED RELEASE ORAL at 06:13

## 2022-06-29 RX ADMIN — METOPROLOL SUCCINATE 50 MG: 50 TABLET, EXTENDED RELEASE ORAL at 09:00

## 2022-06-29 RX ADMIN — TETRAKIS(2-METHOXYISOBUTYLISOCYANIDE)COPPER(I) TETRAFLUOROBORATE 30 MILLICURIE: 1 INJECTION, POWDER, LYOPHILIZED, FOR SOLUTION INTRAVENOUS at 10:06

## 2022-06-29 RX ADMIN — ATORVASTATIN CALCIUM 20 MG: 20 TABLET, FILM COATED ORAL at 09:00

## 2022-06-29 RX ADMIN — LISINOPRIL 10 MG: 5 TABLET ORAL at 09:00

## 2022-06-29 RX ADMIN — TETRAKIS(2-METHOXYISOBUTYLISOCYANIDE)COPPER(I) TETRAFLUOROBORATE 11 MILLICURIE: 1 INJECTION, POWDER, LYOPHILIZED, FOR SOLUTION INTRAVENOUS at 08:49

## 2022-06-29 RX ADMIN — REGADENOSON 0.4 MG: 0.08 INJECTION, SOLUTION INTRAVENOUS at 09:58

## 2022-06-29 RX ADMIN — Medication 400 MG: at 09:00

## 2022-06-29 NOTE — PROGRESS NOTES
0700 Bedside shift change report given to [id number] Coco Grace (oncoming nurse) by Brant Hutchins (offgoing nurse). Report included the following information SBAR, Kardex, ED Summary, Intake/Output, MAR and Recent Results. This patient was assisted with Intentional Toileting every 2 hours during this shift as appropriate. Documentation of ambulation and output reflected on Flowsheet as appropriate. Purposeful hourly rounding was completed using AIDET and 5Ps. Outcomes of PHR documented as they occurred. Bed alarm in use as appropriate. Dual Suction and ambubag in place. 1 Pt waiting on cardiologist to read his stress test results prior to discharge \"pt stated its ok for him to leave later on tonight prior to discharge. FP made aware of pt statement. 1930   Bedside shift change report given to Heaven Beyer RN (oncoming nurse) by Shaka Singh RN  (offgoing nurse). Report included the following information SBAR, Kardex, ED Summary, [facility] and Recent Results.

## 2022-06-29 NOTE — PROGRESS NOTES
Problem: Falls - Risk of  Goal: *Absence of Falls  Description: Document Conrad Powell Fall Risk and appropriate interventions in the flowsheet.   Outcome: Progressing Towards Goal  Note: Fall Risk Interventions:  Mobility Interventions: Bed/chair exit alarm         Medication Interventions: Bed/chair exit alarm,Patient to call before getting OOB         History of Falls Interventions: Bed/chair exit alarm

## 2022-06-29 NOTE — PROGRESS NOTES
[address] [facility] [address] [place] [address]   Office [phone]  Fax [phone]          Subjective / Objective     24 Hour Events: No acute events. Subjective: Patient seen and evaluated. Sitting comfortably in chair. Temp (24hrs), Av.5 °F (36.4 °C), Min:97.3 °F (36.3 °C), Max:98 °F (36.7 °C)     Physical Exam:  General:  No acute distress. Alert. Cooperative. Head: Normocephalic. Atraumatic. Eyes:              Conjunctiva pink. Sclera white. Throat: Mucosa pink. Moist mucous membranes. Neck: Supple. Normal ROM. No stiffness. Respiratory: CTAB. No w/r/r/c.   Cardiovascular: RRR. Normal S1,S2. No m/r/g. Pulses 2+ throughout. GI: + bowel sounds. Nontender. No rebound tenderness or guarding. Nondistended. Extremities:  2+ RLE edema, 1+ LLE edema. No tenderness, erythema or warmth to RLE. Distal pulses intact. Musculoskeletal: Full ROM in all extremities. Skin: Warm, dry. No rashes. Neuro: A&Ox 3. CN II-XII grossly intact. Strength 5/5 in all extremities. Respiratory:   O2 Device: None (Room air)     I/O:  Date 22 - 22 0622 07 - 22 0659   Shift 3897-0861 6760-8646 24 Hour Total 0120-3855 1956-6039 24 Hour Total   INTAKE   P.O. 400  400        P. O. 400  400      Shift Total(mL/kg) 400(4.3)  400(4.3)      OUTPUT   Shift Total(mL/kg)           400      Weight (kg) 93 93 93 93 93 93       Inpatient Medications  Current Facility-Administered Medications   Medication Dose Route Frequency    metoprolol succinate (TOPROL-XL) XL tablet 50 mg  50 mg Oral DAILY    enoxaparin (LOVENOX) injection 40 mg  40 mg SubCUTAneous DAILY    amLODIPine (NORVASC) tablet 10 mg  10 mg Oral DAILY    aspirin delayed-release tablet 81 mg  81 mg Oral EVERY OTHER DAY    atorvastatin (LIPITOR) tablet 20 mg  20 mg Oral DAILY    cetirizine (ZYRTEC) tablet 10 mg  10 mg Oral DAILY    magnesium oxide (MAG-OX) tablet 400 mg  400 mg Oral DAILY    lisinopriL (PRINIVIL, ZESTRIL) tablet 10 mg  10 mg Oral DAILY    pantoprazole (PROTONIX) tablet 40 mg  40 mg Oral ACB    tamsulosin (FLOMAX) capsule 0.4 mg  0.4 mg Oral DAILY    nitroglycerin (NITROBID) 2 % ointment 1 Inch  1 Inch Topical BID PRN         Allergies  No Known Allergies      CBC:  Recent Labs     06/29/22  0148 06/28/22  0034 06/27/22  1915   WBC 6.0 5.8 6.4   HGB 14.5 13.6 13.8   HCT 43.9 41.5 41.9    174 962       Metabolic Panel:  Recent Labs     06/29/22  0148 06/28/22  0034 06/27/22  1915    141 141   K 3.6 4.0 4.0    107 105   CO2 25 27 24   BUN 14 16 17   CREA 0.77 0.75 0.78   * 107* 172*   CA 8.9 9.1 9.2   MG 1.9 1.8  --             Assessment and Plan     Radha Gimenez is a 81 y.o. male with a PMHx of  CAD (s/p stent x1 in 2013), T2DM, HTN, HLD, GERD, prostate cancer who is admitted for angina pectoris.     Angina pectoris in s/o CAD s/p stent: Patient with significant cardiac history, presents with chest pain relieved by nitro. Had stent placed in LAD in 2013. Follows with Dr. Danielle Gomez. Troponin negative at OSH ED, EKG unchanged from prior. Given cardiac history, will admit for further cardiac work-up. Echo with preserved EF 60%  - O2 prn, wean as tolerated  - Continue home ASA 81 mg every other day  - Continue home Lipitor 20 mg daily  - Nitro-Bid as needed  - Daily CBC, BMP, mag  - Blase Gell scheduled for today   - Cardiology following, appreciate reccs   - Possible d/c today pending stress test      RLE edema 2/2 Baker's cyst: Seen on RLE duplex. No DVT/thrombophlebitis.      Hypertension: POA /87.  - Continuing home medications of amlodipine 10 mg daily, lisinopril 10 mg daily, metoprolol 100 mg daily. - Will continue to monitor at this time and readjust as BP's trend.     Diabetes Mellitus T2:        Lab Results   Component Value Date/Time     Hemoglobin A1c 6.6 (H) 03/02/2022 12:00 AM   - Holding home milligrams daily.   -Monitor blood sugar on daily labs     Hyperlipidemia:         Lab Results   Component Value Date/Time     Cholesterol, total 128 03/02/2022 12:00 AM     HDL Cholesterol 48 03/02/2022 12:00 AM     LDL, calculated 57 03/02/2022 12:00 AM     VLDL, calculated 23 03/02/2022 12:00 AM     Triglyceride 134 03/02/2022 12:00 AM   -Continue home Lipitor 20 mg daily     Hx of Prostate Cancer: s/p radiation at [address] in [facility].  -Continue outpatient follow-up     GERD: Chronic.  -Continue home Protonix 40 daily     Seasonal allergies: Chronic.  -Continue home cetirizine 10 mg daily     BPH: Chronic.  -Continue home Flomax 0.4 mg daily        FEN/GI - NPO. Activity - Ambulate with assistance  DVT prophylaxis - Lovenox  GI prophylaxis - Protonix  Fall prophylaxis - Not indicated at this time. Disposition - Admit to Telemetry. Plan to d/c to TBD. Consulting PT/OT/CM  Code Status - Full, discussed with patient / caregivers.   Next of Deannava 70 Name and Contact Jj Brady (Daughter)   [phone]     Patient Felix Dale will be discussed Dr. Nilsa Gu.      I appreciate the opportunity to participate in the care of this patient,  Abad Zuniga, DO  Family Medicine Resident       For Billing    Chief Complaint   Patient presents with   [facility] Chest Pain       Hospital Problems  Date Reviewed: 1/12/2022          Codes Class Noted POA    * (Principal) Angina p[facility]is [facility] ICD-10-CM: I20.9  ICD-9-CM: 413.9  6/27/2022 Unknown        Diabetes mellitus type 2, diet-controlled ([facility] ICD-10-CM: E11.9  ICD-9-CM: 250.00  9/1/2021 Yes        HTN (hypertension) ICD-10-CM: I10  ICD-9-CM: 401.9  Unknown Yes        Dyslipidemia ICD-10-CM: E78.5  ICD-9-CM: 272.4  Unknown Yes        GERD (gastroesophageal reflux disease) ICD-10-CM: K21.9  ICD-9-CM: 530.81  Unknown Yes        BPH (benign prostatic hyperplasia) ICD-10-CM: N40.0  ICD-9-CM: 600.00  7/26/2021 Yes        Prostate CA ([address] ICD-10-CM: C61  ICD-9-CM: 967  Unknown Yes    Overview Addendum 7/26/2021  9:08 PM by Afsaneh Finn MD     Seeing [facility], last was Dr. 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

## 2022-06-29 NOTE — PROGRESS NOTES
6/29/2022   2:28 PM  CM met with patient in person. He is agreeable to home health services. [facility] has decline, [facility] is working to find an alternative home health agency. Care Management Progress Note      ICD-10-CM ICD-9-CM    1. Angina pectoris (HCC)  I20.9 413.9        RUR:  7%  Risk Level: [x]Low []Moderate []High  Value-based purchasing: [x] Yes [] No  Bundle patient: [] Yes [x] No   Specify:     Transition of care plan:  1. Awaiting stress test results  2. Home with home health - [facility] is working to find accepting/available agency  3. Outpatient follow-up. 4. Pt's family to transport  5. CM to continue to follow    Care Management Interventions  Mode of Transport at Discharge: Other (see comment)  Physical Therapy Consult: Yes  Occupational Therapy Consult: Yes  Support Systems: Child(karen)  Confirm Follow Up Transport: Family  Discharge Location  Patient Expects to be Discharged to[de-identified] Home with home health    11:07 AM  [facility] has accepted and sent home health referral to [facility]. 10:36 AM  CM attempted to meet with patient but he is off the unit at this time. CM spoke with daughter Oscar Veliz over the phone. Discussed PT/OT recommendation for Home Health and supervision/assistance at home. Daughter in agreement with referral to [facility] being sent for home health services. Referral sent in [employer], awaiting response. She states that she and her sister will check in on and stay with patient as much as possible in the next several days in order to provide assistance.      Arley Ohara RN

## 2022-06-30 DIAGNOSIS — I25.10 CORONARY ARTERY DISEASE INVOLVING NATIVE CORONARY ARTERY OF NATIVE HEART WITHOUT ANGINA PECTORIS: ICD-10-CM

## 2022-06-30 DIAGNOSIS — I20.9 ANGINA PECTORIS (HCC): ICD-10-CM

## 2022-06-30 DIAGNOSIS — E78.5 DYSLIPIDEMIA: ICD-10-CM

## 2022-06-30 DIAGNOSIS — I10 PRIMARY HYPERTENSION: ICD-10-CM

## 2022-06-30 DIAGNOSIS — K21.9 GASTROESOPHAGEAL REFLUX DISEASE, UNSPECIFIED WHETHER ESOPHAGITIS PRESENT: ICD-10-CM

## 2022-06-30 DIAGNOSIS — E11.9 DIABETES MELLITUS TYPE 2, DIET-CONTROLLED (HCC): ICD-10-CM

## 2022-06-30 LAB
ANION GAP SERPL CALC-SCNC: 10 MMOL/L (ref 5–15)
BUN SERPL-MCNC: 14 MG/DL (ref 6–20)
BUN/CREAT SERPL: 15 (ref 12–20)
CALCIUM SERPL-MCNC: 9.1 MG/DL (ref 8.5–10.1)
CHLORIDE SERPL-SCNC: 105 MMOL/L (ref 97–108)
CO2 SERPL-SCNC: 25 MMOL/L (ref 21–32)
CREAT SERPL-MCNC: 0.91 MG/DL (ref 0.7–1.3)
ERYTHROCYTE [DISTWIDTH] IN BLOOD BY AUTOMATED COUNT: 14.3 % (ref 11.5–14.5)
GLUCOSE SERPL-MCNC: 126 MG/DL (ref 65–100)
HCT VFR BLD AUTO: 44.8 % (ref 36.6–50.3)
HGB BLD-MCNC: 14.9 G/DL (ref 12.1–17)
MAGNESIUM SERPL-MCNC: 1.8 MG/DL (ref 1.6–2.4)
MCH RBC QN AUTO: 28.1 PG (ref 26–34)
MCHC RBC AUTO-ENTMCNC: 33.3 G/DL (ref 30–36.5)
MCV RBC AUTO: 84.4 FL (ref 80–99)
NRBC # BLD: 0 K/UL (ref 0–0.01)
NRBC BLD-RTO: 0 PER 100 WBC
PLATELET # BLD AUTO: 183 K/UL (ref 150–400)
PMV BLD AUTO: 10 FL (ref 8.9–12.9)
POTASSIUM SERPL-SCNC: 3.8 MMOL/L (ref 3.5–5.1)
RBC # BLD AUTO: 5.31 M/UL (ref 4.1–5.7)
SODIUM SERPL-SCNC: 140 MMOL/L (ref 136–145)
STRESS BASELINE DIAS BP: 85 MMHG
STRESS BASELINE HR: 68 BPM
STRESS BASELINE SYS BP: 148 MMHG
STRESS ESTIMATED WORKLOAD: 1 METS
STRESS EXERCISE DUR MIN: 3 MIN
STRESS EXERCISE DUR SEC: 0 SEC
STRESS PEAK DIAS BP: 85 MMHG
STRESS PEAK SYS BP: 148 MMHG
STRESS PERCENT HR ACHIEVED: 72 %
STRESS POST PEAK HR: 96 BPM
STRESS RATE PRESSURE PRODUCT: NORMAL BPM*MMHG
STRESS TARGET HR: 134 BPM
WBC # BLD AUTO: 6.4 K/UL (ref 4.1–11.1)

## 2022-06-30 PROCEDURE — 36415 COLL VENOUS BLD VENIPUNCTURE: CPT

## 2022-06-30 PROCEDURE — 65270000046 HC RM TELEMETRY

## 2022-06-30 PROCEDURE — 83735 ASSAY OF MAGNESIUM: CPT

## 2022-06-30 PROCEDURE — 99232 SBSQ HOSP IP/OBS MODERATE 35: CPT | Performed by: FAMILY MEDICINE

## 2022-06-30 PROCEDURE — 78452 HT MUSCLE IMAGE SPECT MULT: CPT | Performed by: INTERNAL MEDICINE

## 2022-06-30 PROCEDURE — 85027 COMPLETE CBC AUTOMATED: CPT

## 2022-06-30 PROCEDURE — 74011250637 HC RX REV CODE- 250/637: Performed by: STUDENT IN AN ORGANIZED HEALTH CARE EDUCATION/TRAINING PROGRAM

## 2022-06-30 PROCEDURE — 99232 SBSQ HOSP IP/OBS MODERATE 35: CPT | Performed by: SPECIALIST

## 2022-06-30 PROCEDURE — APPSS30 APP SPLIT SHARED TIME 16-30 MINUTES: Performed by: NURSE PRACTITIONER

## 2022-06-30 PROCEDURE — 74011250636 HC RX REV CODE- 250/636: Performed by: STUDENT IN AN ORGANIZED HEALTH CARE EDUCATION/TRAINING PROGRAM

## 2022-06-30 PROCEDURE — 80048 BASIC METABOLIC PNL TOTAL CA: CPT

## 2022-06-30 RX ADMIN — ENOXAPARIN SODIUM 40 MG: 100 INJECTION SUBCUTANEOUS at 08:47

## 2022-06-30 RX ADMIN — ASPIRIN 81 MG: 81 TABLET, COATED ORAL at 08:47

## 2022-06-30 RX ADMIN — METOPROLOL SUCCINATE 50 MG: 50 TABLET, EXTENDED RELEASE ORAL at 08:47

## 2022-06-30 RX ADMIN — Medication 400 MG: at 08:47

## 2022-06-30 RX ADMIN — CETIRIZINE HYDROCHLORIDE 10 MG: 10 TABLET, FILM COATED ORAL at 08:47

## 2022-06-30 RX ADMIN — TAMSULOSIN HYDROCHLORIDE 0.4 MG: 0.4 CAPSULE ORAL at 08:47

## 2022-06-30 RX ADMIN — AMLODIPINE BESYLATE 10 MG: 5 TABLET ORAL at 08:47

## 2022-06-30 RX ADMIN — PANTOPRAZOLE SODIUM 40 MG: 40 TABLET, DELAYED RELEASE ORAL at 07:43

## 2022-06-30 RX ADMIN — LISINOPRIL 10 MG: 5 TABLET ORAL at 08:47

## 2022-06-30 RX ADMIN — ATORVASTATIN CALCIUM 20 MG: 20 TABLET, FILM COATED ORAL at 08:47

## 2022-06-30 NOTE — PROGRESS NOTES
Spoke with [employer] regarding stress test not on list d/t already being read by Dr. Lalit Ogden. Alvino Verdin with family practice to relay message from cardiology.

## 2022-06-30 NOTE — PROGRESS NOTES
06/30/22      Medicare pt has received, reviewed, and signed 2nd IM letter informing them of their right to appeal the discharge. Signed copied has been placed on pt bedside chart.

## 2022-06-30 NOTE — PROGRESS NOTES
Transition of Care Plan: RUR-7%  1. Awaiting stress test results; cardiology following  2. Home with home health - [facility] is working to find accepting/available agency  3. Outpatient follow-up. 4. Pt's family to transport  5. CM to continue to follow  MARILU Posada RN

## 2022-06-30 NOTE — PROGRESS NOTES
0700 Bedside shift change report given to [address] (oncoming nurse) by Eunice Valenzuela RN (offgoing nurse). Report included the following information SBAR, Kardex, ED Summary, Intake/Output, MAR and Recent Results. This patient was assisted with Intentional Toileting every 2 hours during this shift as appropriate. Documentation of ambulation and output reflected on Flowsheet as appropriate. Purposeful hourly rounding was completed using AIDET and 5Ps. Outcomes of PHR documented as they occurred. Bed alarm in use as appropriate. Dual Suction and ambubag in place. 1844 Pt have at home med Myrbetriq extended release 50mg tab at bedside and would like to know if he could take it here. FP made aware and confirm medication with pharmacy and will add medication to Pt list.    1930   Bedside shift change report given to Ayanna Jones 44 (oncoming nurse) by [address] (offgoing nurse). Report included the following information SBAR, Kardex, ED Summary, Intake/Output, MAR and Recent Results.

## 2022-06-30 NOTE — PROGRESS NOTES
Transition of care note:     82yo male with CAD s/p stent placement in 2013, presented with chest pain and was admitted for ACS rule out. Initial workup including Troponin, EKG, and Echo was unremarkable. Lexiscan was abnormal and showed inducibile ischemia. Patient is scheduled for [facility] tomorrow 7/1. Cardiology following.

## 2022-06-30 NOTE — PROGRESS NOTES
[facility]   Office [phone]  Fax [phone]          Subjective / Objective     24 Hour Events: No acute events. Subjective: Patient seen and evaluated. No complaints of chest pain or sob. Reports frustration with not being able to go home yesterday. Temp (24hrs), Av.8 °F (36.6 °C), Min:97.3 °F (36.3 °C), Max:98 °F (36.7 °C)     Physical Exam:  General:  No acute distress. Alert. Cooperative. Head: Normocephalic. Atraumatic. Eyes:              Conjunctiva pink. Sclera white. Throat: Mucosa pink. Moist mucous membranes. Neck: Supple. Normal ROM. No stiffness. Respiratory: CTAB. No w/r/r/c.   Cardiovascular: RRR. Normal S1,S2. No m/r/g. Pulses 2+ throughout. GI: + bowel sounds. Nontender. No rebound tenderness or guarding. Nondistended. Extremities:  1+ RLE edema, 1+ LLE edema. No tenderness, erythema or warmth to RLE. Distal pulses intact. Musculoskeletal: Full ROM in all extremities. Skin: Warm, dry. No rashes. Neuro: A&Ox 3. CN II-XII grossly intact. Strength 5/5 in all extremities. Respiratory:   O2 Device: None (Room air)     I/O:  Date 22 - 22 - 22   Shift  24 Hour Total 1900-0659 24 Hour Total   INTAKE   P.O. 120  120 240  240     P. O. 120  120 240  240   I. V.(mL/kg/hr) 0(0)  0(0) 0  0     I.V. 0  0 0  0   Blood 0  0 0  0     Autotransfused 0  0 0  0   Other 0  0 0  0     Other 0  0 0  0   Shift Total(mL/kg) 120(1.3)  120(1.3) 240(2.6)  240(2.6)   OUTPUT   Urine(mL/kg/hr)           Urine Occurrence(s) 1 x  1 x      Shift Total(mL/kg)           120 240  240   Weight (kg) 93 93 93 93 93 93       Inpatient Medications  Current Facility-Administered Medications   Medication Dose Route Frequency    metoprolol succinate (TOPROL-XL) XL tablet 50 mg  50 mg Oral DAILY    enoxaparin (LOVENOX) injection 40 mg  40 mg SubCUTAneous DAILY    amLODIPine (NORVASC) tablet 10 mg  10 mg Oral DAILY    aspirin delayed-release tablet 81 mg  81 mg Oral EVERY OTHER DAY    atorvastatin (LIPITOR) tablet 20 mg  20 mg Oral DAILY    cetirizine (ZYRTEC) tablet 10 mg  10 mg Oral DAILY    magnesium oxide (MAG-OX) tablet 400 mg  400 mg Oral DAILY    lisinopriL (PRINIVIL, ZESTRIL) tablet 10 mg  10 mg Oral DAILY    pantoprazole (PROTONIX) tablet 40 mg  40 mg Oral ACB    tamsulosin (FLOMAX) capsule 0.4 mg  0.4 mg Oral DAILY    nitroglycerin (NITROBID) 2 % ointment 1 Inch  1 Inch Topical BID PRN         Allergies  No Known Allergies      CBC:  Recent Labs     06/30/22  0100 06/29/22  0148 06/28/22  0034   WBC 6.4 6.0 5.8   HGB 14.9 14.5 13.6   HCT 44.8 43.9 41.5    188 040       Metabolic Panel:  Recent Labs     06/30/22  0100 06/29/22  0148 06/28/22  0034    139 141   K 3.8 3.6 4.0    106 107   CO2 25 25 27   BUN 14 14 16   CREA 0.91 0.77 0.75   * 136* 107*   CA 9.1 8.9 9.1   MG 1.8 1.9 1.8            Assessment and Plan     Jayna Hinds is a 81 y.o. male with a PMHx of  CAD (s/p stent x1 in 2013), T2DM, HTN, HLD, GERD, prostate cancer who is admitted for angina pectoris.     Angina pectoris in s/o CAD s/p stent: Patient with significant cardiac history, presents with chest pain relieved by nitro. Had stent placed in LAD in 2013. Follows with Dr. Gregorio Torres. Purnima with inducibile ischemia. Other workup unremarkable with negative troponin, unchanged EKG, and normal Echo w/ EF 60% normal wall motion.  - [facility] planned for 7/1  - Continue home ASA 81 mg every other day  - Continue home Lipitor 20 mg daily  - Nitro-Bid as needed  - Daily CBC, BMP, mag  - Cardiology following, appreciate reccs   - Possible d/c tomorrow, pending cath and Cardiology recommendations     RLE edema 2/2 Baker's cyst: Seen on RLE duplex.  No DVT/thrombophlebitis.      Hypertension: POA /87.  - Continuing home medications of amlodipine 10 mg daily, lisinopril 10 mg daily, metoprolol 100 mg daily. - Will continue to monitor at this time and readjust as BP's trend.     Diabetes Mellitus T2:        Lab Results   Component Value Date/Time     Hemoglobin A1c 6.6 (H) 03/02/2022 12:00 AM   - Holding home milligrams daily. -Monitor blood sugar on daily labs     Hyperlipidemia:         Lab Results   Component Value Date/Time     Cholesterol, total 128 03/02/2022 12:00 AM     HDL Cholesterol 48 03/02/2022 12:00 AM     LDL, calculated 57 03/02/2022 12:00 AM     VLDL, calculated 23 03/02/2022 12:00 AM     Triglyceride 134 03/02/2022 12:00 AM   -Continue home Lipitor 20 mg daily     Hx of Prostate Cancer: s/p radiation at [address] [place] [address] outpatient follow-up     GERD: Chronic.  -Continue home Protonix 40 daily     Seasonal allergies: Chronic.  -Continue home cetirizine 10 mg daily     BPH: Chronic.  -Continue home Flomax 0.4 mg daily        FEN/GI - NPO. Activity - Ambulate with assistance  DVT prophylaxis - Lovenox  GI prophylaxis - Protonix  Fall prophylaxis - Not indicated at this time. Disposition - Admit to Telemetry. Plan to d/c to TBD. Consulting PT/OT/CM  Code Status - Full, discussed with patient / caregivers.   Next of Kin Name and Contact Estrellita Guevara (Daughter)   [phone]     Patient Braden Enriquez will be discussed Dr. Tanika Pacheco.      I appreciate the opportunity to participate in the care of this patient,  [facility],   Family Medicine Resident       For Billing    Chief Complaint   Patient presents with   [facility] Chest Pain       Hospital Problems  Date Reviewed: 1/12/2022          Codes Class Noted POA    * (Principal) Angina pectoris ([facility] ICD-10-CM: I20.9  ICD-9-CM: 413.9  6/27/2022 Unknown        Diabetes mellitus type 2, diet-controlled ([facility] ICD-10-CM: E11.9  ICD-9-CM: 250.00  9/1/2021 Yes        HTN (hypertension) ICD-10-CM: I10  ICD-9-CM: 401.9  Unknown Yes        Dyslipidemia ICD-10-CM: E78.5  ICD-9-CM: 272.4  Unknown Yes        GERD (gastroesophageal reflux disease) ICD-10-CM: K21.9  ICD-9-CM: 530.81  Unknown Yes        BPH (benign prostatic hyperplasia) ICD-10-CM: N40.0  ICD-9-CM: 600.00  7/26/2021 Yes        Prostate CA ([address] ICD-10-CM: C61  ICD-9-CM: 644  Unknown Yes    Overview Addendum 7/26/2021  9:08 PM by Afsaneh Finn MD     Seeing [facility], last was Dr. 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

## 2022-06-30 NOTE — PROGRESS NOTES
CARDIOLOGY PROGRESS NOTE    Boom Masterson MD,  [address], [place], [address] [facility]  Phone [phone]; Fax [phone]  Mobile [phone]   Voice Mail [phone]                               2022  7:02 PM  Primary Care Nadia Shanks MD  :  [id number]   MRN:  [id number]       Admission Diagnosis: Angina pectoris ([address] [I20.9]      ATTENTION:   This medical record was transcribed using an electronic medical records/speech recognition system. Although proofread, it may and can contain electronic, spelling and other errors. Corrections may be executed at a later time. Please feel free to contact us for any clarifications as needed. Impression Plan/Recommendation   1. Chest discomfort  2. History of CAD with stenting in   3. Hypertension  4. Dyslipidemia  5. Factor V Leiden mutation  6. Aortic stenosis, mild      Troponin 9, BUN 16, Cr 0.75     1. Repeat troponin, 9 on admission  2. TTE 60%  3. EKG with first degree block, RBBB which are present on prev EKG's   4. Metoprolol dose decreased to 50 mg XL  5. Stress test completed yesterday, waiting on final read      Once stress test is read, pt may be discharged home. OP f/u arranged         He is followed by Dr. Danielle Gomez and last saw him on 2022. Radha Gimenez is a 81 y.o. male I am seeing for chest discomfort in the setting of negative troponins and right-sided pain. He has a past medical history significant for CAD with history of MI in 2013 s/p stenting, dyslipidemia, hypertension, and T2DM. Pt reports he was painting at his house yesterday and when he sat down experienced a sharp pain in R side of his chest.  It occurred a few seconds and then went away. He took an aspirin and a nitro at home, then took a second dose of nitro about 5 min later and pain resolved. He describes the pain as substernal on moderate and soft sensation with no radiation. He has not had any pain since. Denies any associated SOB or palpitations. He does admit to [facility] retention\" and will see swelling in his legs. Does not take a diuretic currently. He was using the computer at the time. never had similar pain to MI and believes it was all arm pain. Cardiac risk factors: dyslipidemia, diabetes mellitus, male gender, hypertension. Pt is upset this morning his stress test isn't read and he was not able to discharge yesterday. No Known Allergies      Past Medical History:   Diagnosis Date    CAD (coronary artery disease)     NSTEMI ---> Cath 6/13 - LAD 99% ---> ROQUE to LAD     Dyslipidemia     Factor 5 Leiden mutation, heterozygous [facility]     H/O carpal tunnel repair     HTN (hypertension)     Prostate CA ([facility]     radiation treatments         Past Surgical History:   Procedure Laterality Date    HX CORONARY STENT PLACEMENT  2013    HX OPEN REDUCTION INTERNAL FIXATION Left 05/2021    left patella    MO CARDIAC SURG PROCEDURE UNLIST          . Home Medications:  Prior to Admission Medications   Prescriptions Last Dose Informant Patient Reported? Taking? MOMETASONE FUROATE, BULK, 6/28/2022 at Unknown time  Yes Yes   Sig: by Does Not Apply route. Topical on scapl daily. amLODIPine (NORVASC) 10 mg tablet 6/28/2022 at Unknown time  No Yes   Sig: Take 1 Tablet by mouth daily. aspirin delayed-release 81 mg tablet 6/28/2022 at Unknown time  Yes Yes   Sig: Take 81 mg by mouth every other day. atorvastatin (LIPITOR) 20 mg tablet 6/28/2022 at Unknown time  No Yes   Sig: Take 1 Tablet by mouth daily. cetirizine HCl (ALLER-ABRAN PO) Unknown at Unknown time  Yes No   Sig: Take 1 Tablet by mouth daily. cinnamon bark (Cinnamon) 500 mg cap 6/28/2022 at Unknown time  Yes Yes   Sig: Take 1,000 mg by mouth two (2) times a day. cpap machine kit 6/27/2022 at Unknown time  Yes Yes   Sig: by Does Not Apply route.    docosahexaenoic acid/epa (FISH OIL PO) 6/28/2022 at Unknown time  Yes Yes   Sig: Take 300 mg by mouth two (2) times a day. lisinopriL (PRINIVIL, ZESTRIL) 10 mg tablet 2022 at Unknown time  No Yes   Sig: Take 1 Tablet by mouth daily. magnesium oxide (MAG-OX) 400 mg tablet 2022 at Unknown time  Yes Yes   Sig: Take 400 mg by mouth daily. metFORMIN ER (GLUCOPHAGE XR) 500 mg tablet 2022 at Unknown time  No Yes   Sig: Take 1 Tablet by mouth daily (with dinner). metoprolol succinate (TOPROL-XL) 100 mg tablet 2022 at Unknown time  No Yes   Sig: Take 1 Tablet by mouth daily. nitroglycerin (NITROSTAT) 0.4 mg SL tablet 2022 at Unknown time  Yes Yes   Si.4 mg by SubLINGual route every five (5) minutes as needed for Chest Pain. Up to 3 doses. pantoprazole (PROTONIX) 40 mg tablet 2022 at Unknown time  No Yes   Sig: Take 1 Tablet by mouth daily. tamsulosin (FLOMAX) 0.4 mg capsule 2022 at Unknown time  Yes Yes   Sig: Take 0.4 mg by mouth daily.       Facility-Administered Medications: None       Hospital Medications:  Current Facility-Administered Medications   Medication Dose Route Frequency    metoprolol succinate (TOPROL-XL) XL tablet 50 mg  50 mg Oral DAILY    enoxaparin (LOVENOX) injection 40 mg  40 mg SubCUTAneous DAILY    amLODIPine (NORVASC) tablet 10 mg  10 mg Oral DAILY    aspirin delayed-release tablet 81 mg  81 mg Oral EVERY OTHER DAY    atorvastatin (LIPITOR) tablet 20 mg  20 mg Oral DAILY    cetirizine (ZYRTEC) tablet 10 mg  10 mg Oral DAILY    magnesium oxide (MAG-OX) tablet 400 mg  400 mg Oral DAILY    lisinopriL (PRINIVIL, ZESTRIL) tablet 10 mg  10 mg Oral DAILY    pantoprazole (PROTONIX) tablet 40 mg  40 mg Oral ACB    tamsulosin (FLOMAX) capsule 0.4 mg  0.4 mg Oral DAILY    nitroglycerin (NITROBID) 2 % ointment 1 Inch  1 Inch Topical BID PRN          OBJECTIVE       Laboratory and Imaging have been reviewed and are notable for      ECG:  Date: 2022 sinus rhythm with right bundle branch block first-degree heart block LAD occasional PACs with aberrant conduction      Diagnostic Tests:     No results for input(s): CPK, CKMB, TROIQ in the last 72 hours. No lab exists for component: CKQMB, CPKMB  Recent Labs     06/30/22  0100 06/29/22  0148 06/28/22  0034    139 141   K 3.8 3.6 4.0   CO2 25 25 27   BUN 14 14 16   CREA 0.91 0.77 0.75   * 136* 107*   MG 1.8 1.9 1.8   WBC 6.4 6.0 5.8   HGB 14.9 14.5 13.6   HCT 44.8 43.9 41.5    188 174         Cardiac work up to date:    1) cardiac catheterization  6/13: LAD 99% ROQUE to LAD    2) echocardiogram  1/12/2022: EF 55-60%, mild MR, left atrial argument, right atrial enlargement, mild ascending aorta 3.9 cm    3) stress test  Exercise Cardiolite (2/19)-7 METS no ischemia normal EF    4) carotid studies  (1/19)-less than 50% ICA stenosis bilateral    5) cholesterol  3/2/2022:  HDL 48, LDL 57,                      Social History:  Social History     Tobacco Use    Smoking status: Never Smoker    Smokeless tobacco: Never Used   Substance Use Topics    Alcohol use: Not Currently       Family History:  Family History   Problem Relation Age of Onset    Cancer Mother     Heart Disease Father        Review of Symptoms:  A comprehensive review of systems was negative except for that written in the HPI. Physical Exam:      Visit Vitals  /73 (BP 1 Location: Right arm, BP Patient Position: At rest)   Pulse 69   Temp 97.9 °F (36.6 °C)   Resp 16   Ht 5' 11\" (1.803 m)   Wt 93 kg (205 lb 0.4 oz)   SpO2 92%   BMI 28.60 kg/m²     General Appearance:  Well developed, well nourished,alert and oriented x 3, and individual in no acute distress.    Ears/Nose/Mouth/Throat:   Hearing grossly normal.Normal oral mucosa,no scleral icterus     Neck: Supple no JVD or bruits,no cervical lymphadenopathy left carotid bruit   Chest:   Lungs clear to auscultation bilaterally,no rales rhonchi or wheezing   Cardiovascular:  Regular rate and rhythm, frequent ectopy    Abdomen:   Soft, non-tender, bowel sounds are active. No abdominal bruits   Extremities: No edema bilaterally. Pulses detected, no varicosities   Skin: Warm and dry. No bruising  Neuro  Moves all extermities and neurologically intact                                                       I have discussed the diagnosis with the patient and the intended plan as seen in the above orders. Questions were answered concerning future plans. I have discussed medication side effects and warnings with the patient as well. Isael Galvez is in agreement to the plan listed above and wishes to proceed. he  was instructed not to smoke, eat heart healthy diet  and to exercise.        Mimi Rodriguez, NP

## 2022-06-30 NOTE — PROGRESS NOTES
Patient has questions regarding his pending discharge tonight. Per Dr. Jennifer Jc with [facility], we are awaiting the results of stress test to be read and interpretatted by the cardiologist. Patient was made aware that there is no set time as to when we may receive the information.   Patient is frustrated at this time, but is getting in the bed for rest.

## 2022-07-01 VITALS
BODY MASS INDEX: 28.7 KG/M2 | HEART RATE: 74 BPM | WEIGHT: 205.03 LBS | SYSTOLIC BLOOD PRESSURE: 123 MMHG | HEIGHT: 71 IN | DIASTOLIC BLOOD PRESSURE: 72 MMHG | TEMPERATURE: 97.6 F | OXYGEN SATURATION: 97 % | RESPIRATION RATE: 17 BRPM

## 2022-07-01 PROBLEM — I20.9 ANGINA PECTORIS (HCC): Status: RESOLVED | Noted: 2022-06-27 | Resolved: 2022-07-01

## 2022-07-01 LAB
ACT BLD: 214 SECS (ref 79–138)
ANION GAP SERPL CALC-SCNC: 8 MMOL/L (ref 5–15)
ATRIAL RATE: 58 BPM
ATRIAL RATE: 72 BPM
BUN SERPL-MCNC: 20 MG/DL (ref 6–20)
BUN/CREAT SERPL: 21 (ref 12–20)
CALCIUM SERPL-MCNC: 9.1 MG/DL (ref 8.5–10.1)
CALCULATED P AXIS, ECG09: 51 DEGREES
CALCULATED P AXIS, ECG09: 58 DEGREES
CALCULATED R AXIS, ECG10: -66 DEGREES
CALCULATED R AXIS, ECG10: -69 DEGREES
CALCULATED T AXIS, ECG11: -17 DEGREES
CALCULATED T AXIS, ECG11: 41 DEGREES
CHLORIDE SERPL-SCNC: 104 MMOL/L (ref 97–108)
CO2 SERPL-SCNC: 27 MMOL/L (ref 21–32)
CREAT SERPL-MCNC: 0.97 MG/DL (ref 0.7–1.3)
DIAGNOSIS, 93000: NORMAL
DIAGNOSIS, 93000: NORMAL
ERYTHROCYTE [DISTWIDTH] IN BLOOD BY AUTOMATED COUNT: 14.4 % (ref 11.5–14.5)
GLUCOSE SERPL-MCNC: 113 MG/DL (ref 65–100)
HCT VFR BLD AUTO: 42.1 % (ref 36.6–50.3)
HGB BLD-MCNC: 13.8 G/DL (ref 12.1–17)
MAGNESIUM SERPL-MCNC: 1.8 MG/DL (ref 1.6–2.4)
MCH RBC QN AUTO: 27.8 PG (ref 26–34)
MCHC RBC AUTO-ENTMCNC: 32.8 G/DL (ref 30–36.5)
MCV RBC AUTO: 84.7 FL (ref 80–99)
NRBC # BLD: 0 K/UL (ref 0–0.01)
NRBC BLD-RTO: 0 PER 100 WBC
P-R INTERVAL, ECG05: 254 MS
P-R INTERVAL, ECG05: 270 MS
PLATELET # BLD AUTO: 196 K/UL (ref 150–400)
PMV BLD AUTO: 10.6 FL (ref 8.9–12.9)
POTASSIUM SERPL-SCNC: 3.8 MMOL/L (ref 3.5–5.1)
Q-T INTERVAL, ECG07: 424 MS
Q-T INTERVAL, ECG07: 478 MS
QRS DURATION, ECG06: 154 MS
QRS DURATION, ECG06: 156 MS
QTC CALCULATION (BEZET), ECG08: 464 MS
QTC CALCULATION (BEZET), ECG08: 469 MS
RBC # BLD AUTO: 4.97 M/UL (ref 4.1–5.7)
SODIUM SERPL-SCNC: 139 MMOL/L (ref 136–145)
VENTRICULAR RATE, ECG03: 58 BPM
VENTRICULAR RATE, ECG03: 72 BPM
WBC # BLD AUTO: 5.9 K/UL (ref 4.1–11.1)

## 2022-07-01 PROCEDURE — B2151ZZ FLUOROSCOPY OF LEFT HEART USING LOW OSMOLAR CONTRAST: ICD-10-PCS | Performed by: INTERNAL MEDICINE

## 2022-07-01 PROCEDURE — 83735 ASSAY OF MAGNESIUM: CPT

## 2022-07-01 PROCEDURE — 74011250636 HC RX REV CODE- 250/636: Performed by: INTERNAL MEDICINE

## 2022-07-01 PROCEDURE — 80048 BASIC METABOLIC PNL TOTAL CA: CPT

## 2022-07-01 PROCEDURE — 85347 COAGULATION TIME ACTIVATED: CPT | Performed by: INTERNAL MEDICINE

## 2022-07-01 PROCEDURE — 93042 RHYTHM ECG REPORT: CPT | Performed by: FAMILY MEDICINE

## 2022-07-01 PROCEDURE — 36415 COLL VENOUS BLD VENIPUNCTURE: CPT

## 2022-07-01 PROCEDURE — 93571 IV DOP VEL&/PRESS C FLO 1ST: CPT | Performed by: INTERNAL MEDICINE

## 2022-07-01 PROCEDURE — 99152 MOD SED SAME PHYS/QHP 5/>YRS: CPT | Performed by: INTERNAL MEDICINE

## 2022-07-01 PROCEDURE — 74011250637 HC RX REV CODE- 250/637: Performed by: STUDENT IN AN ORGANIZED HEALTH CARE EDUCATION/TRAINING PROGRAM

## 2022-07-01 PROCEDURE — 77030019569 HC BND COMPR RAD TERU -B: Performed by: INTERNAL MEDICINE

## 2022-07-01 PROCEDURE — 94761 N-INVAS EAR/PLS OXIMETRY MLT: CPT

## 2022-07-01 PROCEDURE — 2709999900 HC NON-CHARGEABLE SUPPLY: Performed by: INTERNAL MEDICINE

## 2022-07-01 PROCEDURE — 85347 COAGULATION TIME ACTIVATED: CPT

## 2022-07-01 PROCEDURE — 93458 L HRT ARTERY/VENTRICLE ANGIO: CPT | Performed by: INTERNAL MEDICINE

## 2022-07-01 PROCEDURE — C1887 CATHETER, GUIDING: HCPCS | Performed by: INTERNAL MEDICINE

## 2022-07-01 PROCEDURE — C1769 GUIDE WIRE: HCPCS | Performed by: INTERNAL MEDICINE

## 2022-07-01 PROCEDURE — 85027 COMPLETE CBC AUTOMATED: CPT

## 2022-07-01 PROCEDURE — 74011000250 HC RX REV CODE- 250: Performed by: INTERNAL MEDICINE

## 2022-07-01 PROCEDURE — 99153 MOD SED SAME PHYS/QHP EA: CPT | Performed by: INTERNAL MEDICINE

## 2022-07-01 PROCEDURE — 74011250636 HC RX REV CODE- 250/636: Performed by: STUDENT IN AN ORGANIZED HEALTH CARE EDUCATION/TRAINING PROGRAM

## 2022-07-01 PROCEDURE — 77030008543 HC TBNG MON PRSS MRTM -A: Performed by: INTERNAL MEDICINE

## 2022-07-01 PROCEDURE — C1894 INTRO/SHEATH, NON-LASER: HCPCS | Performed by: INTERNAL MEDICINE

## 2022-07-01 PROCEDURE — B2111ZZ FLUOROSCOPY OF MULTIPLE CORONARY ARTERIES USING LOW OSMOLAR CONTRAST: ICD-10-PCS | Performed by: INTERNAL MEDICINE

## 2022-07-01 PROCEDURE — 74011000636 HC RX REV CODE- 636: Performed by: INTERNAL MEDICINE

## 2022-07-01 PROCEDURE — 77030041064 HC CATH DX ANGI DXTRTY MEDT -B: Performed by: INTERNAL MEDICINE

## 2022-07-01 PROCEDURE — 77030012468 HC VLV BLEEDBK CNTRL ABBT -B: Performed by: INTERNAL MEDICINE

## 2022-07-01 PROCEDURE — APPSS30 APP SPLIT SHARED TIME 16-30 MINUTES: Performed by: NURSE PRACTITIONER

## 2022-07-01 PROCEDURE — 99238 HOSP IP/OBS DSCHRG MGMT 30/<: CPT | Performed by: FAMILY MEDICINE

## 2022-07-01 PROCEDURE — 4A023N7 MEASUREMENT OF CARDIAC SAMPLING AND PRESSURE, LEFT HEART, PERCUTANEOUS APPROACH: ICD-10-PCS | Performed by: INTERNAL MEDICINE

## 2022-07-01 PROCEDURE — 99232 SBSQ HOSP IP/OBS MODERATE 35: CPT | Performed by: SPECIALIST

## 2022-07-01 RX ORDER — SODIUM CHLORIDE 9 MG/ML
125 INJECTION, SOLUTION INTRAVENOUS CONTINUOUS
Status: DISCONTINUED | OUTPATIENT
Start: 2022-07-01 | End: 2022-07-01

## 2022-07-01 RX ORDER — SODIUM CHLORIDE 0.9 % (FLUSH) 0.9 %
5-40 SYRINGE (ML) INJECTION AS NEEDED
Status: DISCONTINUED | OUTPATIENT
Start: 2022-07-01 | End: 2022-07-01 | Stop reason: HOSPADM

## 2022-07-01 RX ORDER — MIDAZOLAM HYDROCHLORIDE 1 MG/ML
INJECTION, SOLUTION INTRAMUSCULAR; INTRAVENOUS AS NEEDED
Status: DISCONTINUED | OUTPATIENT
Start: 2022-07-01 | End: 2022-07-01 | Stop reason: HOSPADM

## 2022-07-01 RX ORDER — SODIUM CHLORIDE 9 MG/ML
100 INJECTION, SOLUTION INTRAVENOUS CONTINUOUS
Status: DISCONTINUED | OUTPATIENT
Start: 2022-07-01 | End: 2022-07-01 | Stop reason: HOSPADM

## 2022-07-01 RX ORDER — FENTANYL CITRATE 50 UG/ML
INJECTION, SOLUTION INTRAMUSCULAR; INTRAVENOUS AS NEEDED
Status: DISCONTINUED | OUTPATIENT
Start: 2022-07-01 | End: 2022-07-01 | Stop reason: HOSPADM

## 2022-07-01 RX ORDER — METOPROLOL SUCCINATE 50 MG/1
50 TABLET, EXTENDED RELEASE ORAL DAILY
Qty: 30 TABLET | Refills: 0 | Status: SHIPPED | OUTPATIENT
Start: 2022-07-02 | End: 2022-07-07 | Stop reason: SDUPTHER

## 2022-07-01 RX ORDER — SODIUM CHLORIDE 0.9 % (FLUSH) 0.9 %
5-40 SYRINGE (ML) INJECTION EVERY 8 HOURS
Status: DISCONTINUED | OUTPATIENT
Start: 2022-07-01 | End: 2022-07-01 | Stop reason: HOSPADM

## 2022-07-01 RX ORDER — LIDOCAINE HYDROCHLORIDE 10 MG/ML
INJECTION INFILTRATION; PERINEURAL AS NEEDED
Status: DISCONTINUED | OUTPATIENT
Start: 2022-07-01 | End: 2022-07-01 | Stop reason: HOSPADM

## 2022-07-01 RX ORDER — HEPARIN SODIUM 200 [USP'U]/100ML
INJECTION, SOLUTION INTRAVENOUS
Status: COMPLETED | OUTPATIENT
Start: 2022-07-01 | End: 2022-07-01

## 2022-07-01 RX ORDER — VERAPAMIL HYDROCHLORIDE 2.5 MG/ML
INJECTION, SOLUTION INTRAVENOUS AS NEEDED
Status: DISCONTINUED | OUTPATIENT
Start: 2022-07-01 | End: 2022-07-01 | Stop reason: HOSPADM

## 2022-07-01 RX ORDER — HEPARIN SODIUM 1000 [USP'U]/ML
INJECTION, SOLUTION INTRAVENOUS; SUBCUTANEOUS AS NEEDED
Status: DISCONTINUED | OUTPATIENT
Start: 2022-07-01 | End: 2022-07-01 | Stop reason: HOSPADM

## 2022-07-01 RX ADMIN — LISINOPRIL 10 MG: 5 TABLET ORAL at 08:09

## 2022-07-01 RX ADMIN — SODIUM CHLORIDE 125 ML/HR: 9 INJECTION, SOLUTION INTRAVENOUS at 08:19

## 2022-07-01 RX ADMIN — PANTOPRAZOLE SODIUM 40 MG: 40 TABLET, DELAYED RELEASE ORAL at 08:08

## 2022-07-01 RX ADMIN — AMLODIPINE BESYLATE 10 MG: 5 TABLET ORAL at 08:09

## 2022-07-01 RX ADMIN — Medication 400 MG: at 08:09

## 2022-07-01 RX ADMIN — ATORVASTATIN CALCIUM 20 MG: 20 TABLET, FILM COATED ORAL at 08:09

## 2022-07-01 RX ADMIN — TAMSULOSIN HYDROCHLORIDE 0.4 MG: 0.4 CAPSULE ORAL at 08:09

## 2022-07-01 RX ADMIN — METOPROLOL SUCCINATE 50 MG: 50 TABLET, EXTENDED RELEASE ORAL at 08:08

## 2022-07-01 RX ADMIN — SODIUM CHLORIDE 100 ML/HR: 9 INJECTION, SOLUTION INTRAVENOUS at 12:53

## 2022-07-01 RX ADMIN — CETIRIZINE HYDROCHLORIDE 10 MG: 10 TABLET, FILM COATED ORAL at 08:09

## 2022-07-01 NOTE — PROCEDURES
BRIEF PROCEDURE NOTE    Date of Procedure: 7/1/2022   Preoperative Diagnosis:  Pos stress test  Postoperative Diagnosis:  Non obstructive CAD  Procedure: Left heart cath, LV angiography, coronary angiography  Interventional Cardiologist: Melia Kellogg MD  Assistant : none  Anesthesia: local + IV sedation  I administered moderate sedation throughout this procedure. An independent trained observer pushed medications at my direction, and monitored the patients level of consciousness and physiological status throughout. Estimated Blood Loss: Minimal    Access: R Radial Access - 6 F sheath       Catheters: RCA : JR4                    LCA : JL4    Findings:       L Main: large; MLI    LAD: Prox- stent patent;Mid 50%; Distal vessel - small; 60%; D1/D2/D3 - small to med; MLI    LCflex: Med; MLI; OM1 - large; bifurcates into 2 vessels MLI     RCA: Large; Dominant; Mid 50%; PDA and PLB - small to med; MLI    LVEDP: 5 mm Hg    LVEF: Nml    No significant gradient across aortic valve. RCA - Mid 50 % ; iFR 0.94     PCI: none      Specimens Removed : None    Devices implanted : None    Complications: None    Closure Device:         See full cath note.     Complications: none    Melia Kellogg MD

## 2022-07-01 NOTE — PROGRESS NOTES
1242: TRANSFER - IN REPORT:    Verbal report received from [address] RN(name) on [facility]  being received from cath lab(unit) for routine post - op      Report consisted of patients Situation, Background, Assessment and   Recommendations(SBAR). Information from the following report(s) Procedure Summary was reviewed with the receiving nurse. Opportunity for questions and clarification was provided. Assessment completed upon patients arrival to unit and care assumed. Patient received from cath lab. Patient with TR band to right radial site. Site clean, dry and intact. No hematoma. Pulses palpable. VS stable. Patient with no complaints at this time. Splint in place    1345: 2 ml air released from TR Band. No bleeding or hematoma noted. Radial and Ulnar pulse on right wrist palpable. Pt tolerated well. Will continue to monitor. 1351: 3 ml air released from TR Band. No bleeding or hematoma noted. Radial and Ulnar pulse on right wrist palpable. Pt tolerated well. Will continue to monitor. 1356: 3 ml air released from TR Band. No bleeding or hematoma noted. Radial and Ulnar pulse on right wrist palpable. Pt tolerated well. Will continue to monitor. 1401: 3 ml air released from TR Band. No bleeding or hematoma noted. Radial and Ulnar pulse on right wrist palpable. Pt tolerated well. Will continue to monitor. 1401: Air release completed. TR Band removed from right wrist. Bleeding noted. Quikclot applied and pressure held for 10 minutes     1410: Dressing applied. No bleeding and no hematoma. Wrist immobilizer in place. Radial and ulnar pulse remain palpable on affected extremity. Pt tolerated well. Instructions given to pt regarding movement and activity restrictions. Pt voiced understanding. 1435: Patient transported to [facility]. Patient ambulated to the bathroom. Site visualized with RN. CDI.  No hematoma    TRANSFER - OUT REPORT:    Verbal report given to Amie NOLASCO(name) on Radha Gimenez  being transferred to [facility](unit) for routine progression of care       Report consisted of patients Situation, Background, Assessment and   Recommendations(SBAR). Information from the following report(s) SBAR, Procedure Summary, Intake/Output, MAR, Recent Results and Cardiac Rhythm NSR, PVSs, BBB was reviewed with the receiving nurse. Lines:   Peripheral IV 06/27/22 Right Forearm (Active)   Site Assessment Clean, dry, & intact 07/01/22 0723   Phlebitis Assessment 0 07/01/22 0723   Infiltration Assessment 0 07/01/22 0723   Dressing Status Clean, dry, & intact 07/01/22 0723   Dressing Type Transparent;Tape 07/01/22 0723   Hub Color/Line Status End cap changed; Patent; Flushed 07/01/22 9772   Action Taken Open ports on tubing capped 07/01/22 0723   Alcohol Cap Used Yes 07/01/22 0723        Opportunity for questions and clarification was provided.       Patient transported with:   Monitor  Registered Nurse

## 2022-07-01 NOTE — PROGRESS NOTES
[address] [place] [address]   Office [phone]  Fax [phone]     DAILY PROGRESS NOTE    24 Hour Events: no acute events. SUBJECTIVE: Denies chest pain, SOB, nausea, vomiting, abdominal pain, dizziness. OBJECTIVE:    Tele: Sinus 78 with premature ventricular contractions. Vitals:   Visit Vitals  /72 (BP 1 Location: Right upper arm, BP Patient Position: At rest;Sitting)   Pulse 72   Temp 97.4 °F (36.3 °C)   Resp 17   Ht 5' 11\" (1.803 m)   Wt 205 lb 0.4 oz (93 kg)   SpO2 95%   BMI 28.60 kg/m²     Physical Exam:  General: Patient is sitting upright and comfortably in chair. He does not appear to be in acute distress. Respiratory: Lungs are clear to auscultation bilaterally with no wheezing, rhonchi, rales, or crackles. Cardiovascular: S1 and S2 auscultated. Regular rate and rhythm. No murmurs, rubs, or gallops. GI: Abdomen is soft and nondistended. Bowel sounds present throughout. No tenderness to palpation in all four quadrants. Extremities: No swelling of BL lower extremities. 1+ pitting edema of RLE, no pitting edema of LLE. Skin: Warm, dry. Neuro: Patient is alert and oriented to person, place, and time. I/O:  Date 06/30/22 0700 - 07/01/22 0659 07/01/22 0700 - 07/02/22 0659   Shift 0700-1859 1900-0659 24 Hour Total 4165-4697 3997-6859 24 Hour Total   INTAKE   P.O. 240  240        P. O. 240  240      I. V.(mL/kg/hr) 0(0)  0(0)        I.V. 0  0      Blood 0  0        Autotransfused 0  0      Other 0  0        Other 0  0      Shift Total(mL/kg) 240(2.6)  240(2.6)      OUTPUT   Urine(mL/kg/hr)    0  0     Urine Voided    0  0   Shift Total(mL/kg)    0(0)  0(0)     240 0  0   Weight (kg) 93 93 93 93 93 93       Inpatient Medications  Current Facility-Administered Medications   Medication Dose Route Frequency    0.9% sodium chloride infusion  125 mL/hr IntraVENous CONTINUOUS    metoprolol succinate (TOPROL-XL) XL tablet 50 mg  50 mg Oral DAILY    [Held by provider] enoxaparin (LOVENOX) injection 40 mg  40 mg SubCUTAneous DAILY    amLODIPine (NORVASC) tablet 10 mg  10 mg Oral DAILY    aspirin delayed-release tablet 81 mg  81 mg Oral EVERY OTHER DAY    atorvastatin (LIPITOR) tablet 20 mg  20 mg Oral DAILY    cetirizine (ZYRTEC) tablet 10 mg  10 mg Oral DAILY    magnesium oxide (MAG-OX) tablet 400 mg  400 mg Oral DAILY    lisinopriL (PRINIVIL, ZESTRIL) tablet 10 mg  10 mg Oral DAILY    pantoprazole (PROTONIX) tablet 40 mg  40 mg Oral ACB    tamsulosin (FLOMAX) capsule 0.4 mg  0.4 mg Oral DAILY    nitroglycerin (NITROBID) 2 % ointment 1 Inch  1 Inch Topical BID PRN       Allergies  No Known Allergies    CBC:  Recent Labs     07/01/22  0120 06/30/22  0100 06/29/22  0148   WBC 5.9 6.4 6.0   HGB 13.8 14.9 14.5   HCT 42.1 44.8 43.9    183 356       Metabolic Panel:  Recent Labs     07/01/22  0120 06/30/22  0100 06/29/22  0148    140 139   K 3.8 3.8 3.6    105 106   CO2 27 25 25   BUN 20 14 14   CREA 0.97 0.91 0.77   * 126* 136*   CA 9.1 9.1 8.9   MG 1.8 1.8 1.9             Assessment and Plan     Panchito Acevedo is a 81 y.o. male admitted for Angina Pectoris s/o CAD. Angina Pectoris in s/o CAD s/p stent: Patient with significant cardiac history, presents with chest pain relieved by nitro. No chest pain episodes in past 24 hours. EKG unchanged from prior. Given cardiac history, was admitted for further work-up of chest pain. Echo 06/28 showed preserved EF 60%. Lexiscan stress test 06/29 showed inducible ischemia with partially reversible perfusion defect.   -Continue home ASA 81 mg every other day. -Continue home Lipitor 20 mg daily.   -Nitro-bid prn.   -Daily CBC, BMP, mag.   -Cardiology following. Cath scheduled 07/01 11:30am with Cardiology. Possibly discharge following cath to home.  -Had stent placed in LAD in 2013. Follows with Dr. Bette Stewart. RLE edema 2/2 Baker's cyst: Seen on RLE duplex. No DVT/thrombophlebitis. Hypertension: POA /87mmHg.   -continuing home medications of amlodipine 10mg daily, lisinopril 10 mg daily -metoprolol decreased to 50 mg daily per cardiology for low HR  -Will continue to monitor at this time and readjust as BP's trend    Diabetes Mellitus T2: HgA1c 03/22 was 6.6. Diet controlled. -Monitor sugar on BMP. Hyperlipidemia: Continue home Lipitor 20 mg daily. Hx prostate cancer: s/p radiation at [address] in [place].   -continue outpatient follow up. GERD: Chronic.  -continue home Protonix 40mg daily. Seasonal allergies: Chronic  -continue home cetirizine 10 mg daily. BPH: Chronic  -Continue home Flomax 0.4mg daily. FEN/GI - NPO since midnight 07/01 pending cath. Change to cardiac diet, patient can eat after procedure. Activity - ambulate with assistance  DVT prophylaxis - lovenox held 07/01 pending cath. GI prophylaxis - Protonix  Fall prophylaxis - not indicated at this time. Disposition - Plan to discharge pending cath results 07/01. PT recommendation is discharge with home health/PT follow up 4x/week. Code status - full. Next of kin Name and Contact  - Laura Shepard (daughter) [phone]. Patient Yoel Jackson will be discussed with Dr. Tonio Lopez.     Rosalva Méndez MD  Family Medicine Resident       For Billing    Chief Complaint   Patient presents with   [facility] Chest Pain       Hospital Problems  Date Reviewed: 1/12/2022          Codes Class Noted POA    * (Principal) [facility]ina pectoris [facility] ICD-10-CM: I20.9  ICD-9-CM: 413.9  6/27/2022 Unknown        Diabetes mellitus type 2, diet-controlled ([facility] ICD-10-CM: E11.9  ICD-9-CM: 250.00  9/1/2021 Yes        HTN (hypertension) ICD-10-CM: I10  ICD-9-CM: 401.9  Unknown Yes        Dyslipidemia ICD-10-CM: E78.5  ICD-9-CM: 272.4  Unknown Yes        GERD (gastroesophageal reflux disease) ICD-10-CM: K21.9  ICD-9-CM: 530.81  Unknown Yes        BPH (benign prostatic hyperplasia) ICD-10-CM: N40.0  ICD-9-CM: 600.00  7/26/2021 Yes        Prostate CA [facility]) ICD-10-CM: Jovan Westbrook  ICD-9-CM: 185  Unknown Yes    Overview Addendum 7/26/2021  9:08 PM by Mary Ann Lamb MD     Seeing [facility], last was Dr. 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

## 2022-07-01 NOTE — DISCHARGE SUMMARY
} [address] [facility] [address]   Office [phone]  Fax [phone]       Discharge Note     Name: Kay Mayorga MRN: [id number]  Sex: Male   YOB: 1935  Age: 81 y.o. PCP: Kasandra Bansal MD     Date of admission: 6/27/2022  Date of discharge: 7/1/2022    Attending physician at admission: Samantha Rees. Attending physician at discharge: No att. providers found  Resident physician at discharge: Toi Blake MD     Consultants during hospitalization  IP CONSULT TO [id number] [facility]     Admission diagnoses   Angina pectoris ([facility] [address] [I20.9]    Recommended follow-up after discharge    1. PCP - Kasandra Bansal MD  2. Cardiologist - Liset Duvall MD     Things to follow up on with PCP:   1. Angina Pectoris  2. Low heart rate    Medication Changes:  START: none     STOP: none     CHANGES: Metoprolol succinate 100 mg by mouth once daily stopped. Instead take Metoprolol succinate 50 mg by mouth once daily. 30 day supply sent to patient's pharmacy. No other changes were made to pt's medications. Discharge Medications:  Current Discharge Medication List      CONTINUE these medications which have CHANGED    Details   metoprolol succinate (TOPROL-XL) 50 mg XL tablet Take 1 Tablet by mouth daily. Qty: 30 Tablet, Refills: 0         CONTINUE these medications which have NOT CHANGED    Details   mirabegron ER (MYRBETRIQ) 50 mg ER tablet Take 50 mg by mouth daily. pantoprazole (PROTONIX) 40 mg tablet Take 1 Tablet by mouth daily. Qty: 90 Tablet, Refills: 3    Associated Diagnoses: Gastroesophageal reflux disease, unspecified whether esophagitis present      amLODIPine (NORVASC) 10 mg tablet Take 1 Tablet by mouth daily. Qty: 90 Tablet, Refills: 3    Associated Diagnoses: Essential hypertension      metFORMIN ER (GLUCOPHAGE XR) 500 mg tablet Take 1 Tablet by mouth daily (with dinner).   Qty: 90 Tablet, Refills: 1      lisinopriL (PRINIVIL, ZESTRIL) 10 mg tablet Take 1 Tablet by mouth daily.  Qty: 90 Tablet, Refills: 3    Associated Diagnoses: Primary hypertension      tamsulosin (FLOMAX) 0.4 mg capsule Take 0.4 mg by mouth daily. cpap machine kit by Does Not Apply route. cinnamon bark (Cinnamon) 500 mg cap Take 1,000 mg by mouth two (2) times a day. MOMETASONE FUROATE, BULK, by Does Not Apply route. Topical on scapl daily. atorvastatin (LIPITOR) 20 mg tablet Take 1 Tablet by mouth daily. Qty: 90 Tablet, Refills: 3      aspirin delayed-release 81 mg tablet Take 81 mg by mouth every other day. nitroglycerin (NITROSTAT) 0.4 mg SL tablet 0.4 mg by SubLINGual route every five (5) minutes as needed for Chest Pain. Up to 3 doses. docosahexaenoic acid/epa (FISH OIL PO) Take 300 mg by mouth two (2) times a day. magnesium oxide (MAG-OX) 400 mg tablet Take 400 mg by mouth daily. cetirizine HCl (ALLER-ABRAN PO) Take 1 Tablet by mouth daily. History of Present Illness    As per admitting provider, Dr. Soto Isabel M.D.:     Luis Perkins is a 81 y.o. male with known history of CAD (s/p stent x1 in 2013), T2DM, HTN, HLD, GERD, prostate cancer who presents to the ER complaining of chest pain. He says that yesterday he was painting throughout the day, and when he sat down after he finished, he began to feel a sense sharp pain in his right upper chest.  The pain lasted for few seconds, and went away, but then returned several minutes later. At this time he took an aspirin and a nitro, followed by another nitro 5 minutes later, with relief. He then went to the emergency department. He has not experienced chest pain since the second dose of nitro. Denies shortness of breath, nausea, vomiting, radiating pain. Hospital course    Luis Perkins is a 81 y.o. male admitted for Angina Pectoris in the setting of Coronary Artery Disease. Patient's stress test showed inducible ischemia. Cardiac catheterization was performed, but no stents were required. Angina Pectoris in setting of Coronary Artery Disease : Resolved. Cath (7/1) with patent stent. - Continue ASA 81mg   - Continue prn Nitro     Right Lower Extremity edema:   2+ pitting edema seen, likely due to Baker's cyst in right popliteal fossa. - Follow up with sports medicine outpt     Hypertension:   Home amlodipine and lisinopril were continued during the hospital stay. - Metoprolol succinate 50mg daily (decreased from 100mg)     Diabetes Mellitus Type 2: Continue metformin      Hyperlipidemia: Continue home Lipitor 20 mg daily.     Hx prostate cancer: Continue to follow up with [facility] in [place].      GERD: Continue home Protonix 40mg daily.     Seasonal allergies: Continue home cetirizine 10 mg daily. BPH: Continue home Flomax 0.4mg daily. Visit Vitals  /72 (BP 1 Location: Left upper arm, BP Patient Position: At rest;Sitting)   Pulse 74   Temp 97.6 °F (36.4 °C)   Resp 17   Ht 5' 11\" (1.803 m)   Wt 205 lb 0.4 oz (93 kg)   SpO2 97%   BMI 28.60 kg/m²       Physical Examination:  General: Patient is sitting upright and comfortably in chair. He does not appear to be in acute distress. Respiratory: Lungs are clear to auscultation bilaterally with no wheezing, rhonchi, rales, or crackles. Cardiovascular: S1 and S2 auscultated. Regular rate and rhythm. No murmurs, rubs, or gallops. GI: Abdomen is soft and nondistended. Bowel sounds present throughout. No tenderness to palpation in all four quadrants. Extremities: No swelling of BL lower extremities. 1+ pitting edema of RLE, no pitting edema of LLE. Skin: Warm, dry. Neuro: Patient is alert and oriented to person, place, and time.        Condition at discharge: Stable.     Labs  Recent Labs     07/01/22 0120 06/30/22 0100 06/29/22  0148   WBC 5.9 6.4 6.0   HGB 13.8 14.9 14.5   HCT 42.1 44.8 43.9    183 188     Recent Labs     07/01/22 0120 06/30/22 0100 06/29/22  0148    140 139   K 3.8 3.8 3.6    105 106 CO2 27 25 25   BUN 20 14 14   CREA 0.97 0.91 0.77   * 126* 136*   CA 9.1 9.1 8.9   MG 1.8 1.8 1.9     No results for input(s): ALT, AP, TBIL, TBILI, TP, ALB, GLOB, GGT, AML, LPSE in the last 72 hours. No lab exists for component: SGOT, GPT, AMYP, HLPSE  No results for input(s): PH, PCO2, PO2, TNIPOC, TROIQ, INR, PTP, APTT, FE, TIBC, PSAT, FERR, GLUCPOC, INREXT, INREXT in the last 72 hours. No lab exists for component: GLPOC    Micro:  No results found for: CULT    Imaging:  XR CHEST PA LAT    Result Date: 6/27/2022  PA AND LATERAL CHEST RADIOGRAPHS: 6/27/2022 7:36 PM INDICATION: Chest pain. COMPARISON: None. TECHNIQUE: Frontal and lateral radiographs of the chest. FINDINGS: The lungs are clear. The central airways are patent. The heart size is normal. No pneumothorax or pleural effusion. Clear lungs. ECHO ADULT COMPLETE    Result Date: 6/28/2022    Left Ventricle: Normal left ventricular systolic function. EF by visual approximation is 60%. Left ventricle size is normal. Normal wall thickness. Normal wall motion. Normal diastolic function. NUCLEAR CARDIAC STRESS TEST    Result Date: 6/30/2022    Nuclear Findings: LV perfusion is abnormal. There is evidence of inducible ischemia. There is a moderate severity left ventricular stress perfusion defect that is small to medium in size present in the mid to distal inferior and inferoapical segment(s) that is partially reversible. SDS 5, SSS 6, SRS 1.   ECG: Resting ECG demonstrates normal sinus rhythm, first-degree AV block and right bundle branch block.   Stress Test: A pharmacological stress test was performed using lexiscan. Blood pressure demonstrated a normal response and heart rate demonstrated a normal response to stress. The patient's heart rate recovery was normal. The patient reported no symptoms during the stress test. The patient reached the end of the protocol.   Resting ECG: The ECG shows sinus rhythm.  ECG demonstrates first-degree AV block and right bundle branch block. The ECG shows frequent multifocal premature ventricular contractions.   Stress ECG: Arrhythmias during stress: frequent multifocal PVCs, frequent multifocal PVC couplets. Non-specific ST abnormality noted. Arrhythmias during recovery: frequent multifocal PVCs with frequent multifocal PVC couplets. DUPLEX LOWER EXT VENOUS BILAT    Result Date: 6/29/2022  Bilateral lower extremity venous duplex negative for deep venous thrombosis or thrombophlebitis in the visualized vein segments. Incidental findings: (1) There is a fluid filled area seen in the right popliteal fossa that could be indicative of a Baker's cyst. Clinical correlation is recommended. (2) Prominent right groin lymph nodes. Procedures / Diagnostic Studies  · Left Heart Catheterization (07/01) showed non-obstructive CAD and patent LAD stent. Chronic diagnoses   Problem List as of 7/1/2022 Date Reviewed: 1/12/2022          Codes Class Noted - Resolved    Blunt trauma of face ICD-10-CM: S09. 93XA  ICD-9-CM: 959.09  3/14/2022 - Present        Grieving ICD-10-CM: F43.21  ICD-9-CM: 309.0  9/1/2021 - Present    Overview Signed 9/1/2021  9:43 AM by Klaudia Jacob MD     Lost wife of 72 years, May 2021             Diabetes mellitus type 2, diet-controlled ([facility] ICD-10-CM: E11.9  ICD-9-CM: 250.00  9/1/2021 - Present        HTN (hypertension) ICD-10-CM: I10  ICD-9-CM: 401.9  Unknown - Present        Dyslipidemia ICD-10-CM: E78.5  ICD-9-CM: 272.4  Unknown - Present        History of hematuria ICD-10-CM: Z87.448  ICD-9-CM: V13.09  Unknown - Present        GERD (gastroesophageal reflux disease) ICD-10-CM: K21.9  ICD-9-CM: 530.81  Unknown - Present        BPH (benign prostatic hyperplasia) ICD-10-CM: N40.0  ICD-9-CM: 600.00  7/26/2021 - Present        CAD (coronary artery disease) ICD-10-CM: I25.10  ICD-9-CM: 414.00  Unknown - Present        * (Principal) RESOLVED: Angina pectoris ([facility] ICD-10-CM: I20.9  ICD-9-CM: 413.9  6/27/2022 - 7/1/2022        RESOLVED: Prostate CA ([address] ICD-10-CM: C61  ICD-9-CM: 443  Unknown - 7/1/2022    Overview Addendum 7/26/2021  9:08 PM by Rusty Bustamante MD     Seeing [facility], last was Dr. 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 , Normal, Disp-30 Tablet, R-0         CONTINUE these medications which have NOT CHANGED    Details   mirabegron ER (MYRBETRIQ) 50 mg ER tablet Take 50 mg by mouth daily. , Historical Med      pantoprazole (PROTONIX) 40 mg tablet Take 1 Tablet by mouth daily. , Normal, Disp-90 Tablet, R-3      amLODIPine (NORVASC) 10 mg tablet Take 1 Tablet by mouth daily. , Normal, Disp-90 Tablet, R-3      metFORMIN ER (GLUCOPHAGE XR) 500 mg tablet Take 1 Tablet by mouth daily (with dinner). , Normal, Disp-90 Tablet, R-1      lisinopriL (PRINIVIL, ZESTRIL) 10 mg tablet Take 1 Tablet by mouth daily. , Normal, Disp-90 Tablet, R-3      tamsulosin (FLOMAX) 0.4 mg capsule Take 0.4 mg by mouth daily. , Historical Med      cpap machine kit by Does Not Apply route., Historical Med      cinnamon bark (Cinnamon) 500 mg cap Take 1,000 mg by mouth two (2) times a day., Historical Med      MOMETASONE FUROATE, BULK, by Does Not Apply route. Topical on scapl daily. , Historical Med      atorvastatin (LIPITOR) 20 mg tablet Take 1 Tablet by mouth daily. , Normal, Disp-90 Tablet, R-3      aspirin delayed-release 81 mg tablet Take 81 mg by mouth every other day., Historical Med      nitroglycerin (NITROSTAT) 0.4 mg SL tablet 0.4 mg by SubLINGual route every five (5) minutes as needed for Chest Pain. Up to 3 doses. , Historical Med      docosahexaenoic acid/epa (FISH OIL PO) Take 300 mg by mouth two (2) times a day., Historical Med      magnesium oxide (MAG-OX) 400 mg tablet Take 400 mg by mouth daily. , Historical Med      cetirizine HCl (ALLER-ABRAN PO) Take 1 Tablet by mouth daily. , Historical Med              Diet:  Cardiac diet. Activity:  As tolerated     Disposition: [address] with Home Health for Physical Therapy. Discharge instructions to patient/family  Please seek medical attention for any new or worsening symptoms particularly fever, chest pain, shortness of breath, abdominal pain, nausea, vomiting    Follow up plans/appointments  Follow-up Information     Follow up With Specialties Details Why Contact Info    Thony Talbot DO Family Medicine On 7/7/2022 At 1:15pm for hospital follow up [address], [facility]  Physical therapy [facility], [facility] [address]    Sravani Kwon MD Cardiovascular Disease Physician On 7/13/2022 9:20 am  [address]      Greyson Cruz [facility]  [address] [place] [address]  [phone]             Niurka Bui MD  Family Medicine Resident       For Billing    Chief Complaint   Patient presents with   Everlene Didier Chest Pain       Hospital Problems  Date Reviewed: 1/12/2022          Codes Class Noted POA    Diabetes mellitus type 2, diet-controlled ([facility] ICD-10-CM: E11.9  ICD-9-CM: 250.00  9/1/2021 Yes        HTN (hypertension) ICD-10-CM: I10  ICD-9-CM: 401.9  Unknown Yes        Dyslipidemia ICD-10-CM: E78.5  ICD-9-CM: 272.4  Unknown Yes        GERD (gastroesophageal reflux disease) ICD-10-CM: K21.9  ICD-9-CM: 530.81  Unknown Yes        BPH (benign prostatic hyperplasia) ICD-10-CM: N40.0  ICD-9-CM: 600.00  7/26/2021 Yes        CAD (coronary artery disease) ICD-10-CM: I25.10  ICD-9-CM: 414.00  Unknown Yes              .

## 2022-07-01 NOTE — PROGRESS NOTES
1445:  Pt to d/c to home today transported by family. He will be getting home health from [facility]. Pt to f/u OP with providers as scheduled. 1345:  Third call placed to [facility] resulted in the answer that [facility] has been approved and authorized. CM Note:  TC placed to [facility] re home health. They are not accepting any referrals until 7/5. TC to [facility]. The referral was explained to them about [facility]. [facility] cannot accept anything unless it is authorized by  [employer]. TC to [facility]. Spoke with Renella Lombard. Explained that the pt is likely to d/c today and we still don't have an agency who has aceepted the pt. This CM informed them that [facility] can accept the pt if they will authorize it. Renella Lombard called back a few minutes later and said [facility] has accepted.   Kam RN

## 2022-07-01 NOTE — PROGRESS NOTES
Problem: Falls - Risk of  Goal: *Absence of Falls  Description: Document Reynaldo Trammell Fall Risk and appropriate interventions in the flowsheet.   Outcome: Progressing Towards Goal  Note: Fall Risk Interventions:  Mobility Interventions: Bed/chair exit alarm         Medication Interventions: Bed/chair exit alarm         History of Falls Interventions: Bed/chair exit alarm         Problem: Patient Education: Go to Patient Education Activity  Goal: Patient/Family Education  Outcome: Progressing Towards Goal     Problem: Patient Education: Go to Patient Education Activity  Goal: Patient/Family Education  Outcome: Progressing Towards Goal

## 2022-07-01 NOTE — PROGRESS NOTES
CARDIOLOGY PROGRESS NOTE    Boom Pike MD,  [address], [place], [address] [facility]  Phone [phone]; Fax [phone]  Mobile [phone]   Voice Mail [phone]                               2022  7:02 PM  Primary Care Zandra Gates MD  :  1935   MRN:  [id number]       Admission Diagnosis: Angina pectoris ([address] [I20.9]      ATTENTION:   This medical record was transcribed using an electronic medical records/speech recognition system. Although proofread, it may and can contain electronic, spelling and other errors. Corrections may be executed at a later time. Please feel free to contact us for any clarifications as needed. Impression Plan/Recommendation   1. Chest discomfort:   2. History of CAD with stenting in   3. Hypertension  4. Dyslipidemia  5. Factor V Leiden mutation  6. Aortic stenosis, mild      Troponin 9, BUN 16, Cr 0.75       1. TTE 60%  2. EKG with first degree block, RBBB which are present on prev EKG's   3. Metoprolol dose decreased to 50 mg XL  Stress test noted:  LV perfusion is abnormal. There is evidence of inducible ischemia. There is a moderate severity left ventricular stress perfusion defect that is small to medium in size present in the mid to distal inferior and inferoapical segment(s) that is partially reversible  4. For cardiac cath today. 5. GDMT: asa/statin/BB                 NP spent 12 minutes in data/chart review. NP spent 13 minutes with pt in examination and care plan review, all questions answered. He is followed by Dr. Regi Fischer and last saw him on 2022. Duke Franklin is a 81 y.o. male I we are seeing for chest discomfort in the setting of negative troponins and right-sided pain. He has a past medical history significant for CAD with history of MI in 2013 s/p stenting, dyslipidemia, hypertension, and T2DM.   Pt reports he was painting at his house yesterday and when he sat down experienced a sharp pain in R side of his chest.  It occurred a few seconds and then went away. He took an aspirin and a nitro at home, then took a second dose of nitro about 5 min later and pain resolved. He describes the pain as substernal on moderate and soft sensation with no radiation. He has not had any pain since. Denies any associated SOB or palpitations. He does admit to [place] retention\" and will see swelling in his legs. Does not take a diuretic currently. He was using the computer at the time. never had similar pain to MI and believes it was all arm pain. Cardiac risk factors: dyslipidemia, diabetes mellitus, male gender, hypertension. No Known Allergies      Past Medical History:   Diagnosis Date    CAD (coronary artery disease)     NSTEMI ---> Cath 6/13 - LAD 99% ---> ROQUE to LAD     Dyslipidemia     Factor 5 Leiden mutation, [facility]     H/O carpal tunnel repair     HTN (hypertension)     Prostate CA ([facility]     radiation treatments         Past Surgical History:   Procedure Laterality Date    HX CORONARY STENT PLACEMENT  2013    HX OPEN REDUCTION INTERNAL FIXATION Left 05/2021    left patella    OR CARDIAC SURG PROCEDURE UNLIST          . Home Medications:  Prior to Admission Medications   Prescriptions Last Dose Informant Patient Reported? Taking? MOMETASONE FUROATE, BULK, 6/28/2022 at Unknown time  Yes Yes   Sig: by Does Not Apply route. Topical on scapl daily. amLODIPine (NORVASC) 10 mg tablet 6/28/2022 at Unknown time  No Yes   Sig: Take 1 Tablet by mouth daily. aspirin delayed-release 81 mg tablet 6/28/2022 at Unknown time  Yes Yes   Sig: Take 81 mg by mouth every other day. atorvastatin (LIPITOR) 20 mg tablet 6/28/2022 at Unknown time  No Yes   Sig: Take 1 Tablet by mouth daily. cetirizine HCl (ALLER-ABRAN PO) Unknown at Unknown time  Yes No   Sig: Take 1 Tablet by mouth daily.    cinnamon bark (Cinnamon) 500 mg cap 6/28/2022 at Unknown time  Yes Yes   Sig: Take 1,000 mg by mouth two (2) times a day. cpap machine kit 2022 at Unknown time  Yes Yes   Sig: by Does Not Apply route. docosahexaenoic acid/epa (FISH OIL PO) 2022 at Unknown time  Yes Yes   Sig: Take 300 mg by mouth two (2) times a day. lisinopriL (PRINIVIL, ZESTRIL) 10 mg tablet 2022 at Unknown time  No Yes   Sig: Take 1 Tablet by mouth daily. magnesium oxide (MAG-OX) 400 mg tablet 2022 at Unknown time  Yes Yes   Sig: Take 400 mg by mouth daily. metFORMIN ER (GLUCOPHAGE XR) 500 mg tablet 2022 at Unknown time  No Yes   Sig: Take 1 Tablet by mouth daily (with dinner). metoprolol succinate (TOPROL-XL) 100 mg tablet 2022 at Unknown time  No Yes   Sig: Take 1 Tablet by mouth daily. nitroglycerin (NITROSTAT) 0.4 mg SL tablet 2022 at Unknown time  Yes Yes   Si.4 mg by SubLINGual route every five (5) minutes as needed for Chest Pain. Up to 3 doses. pantoprazole (PROTONIX) 40 mg tablet 2022 at Unknown time  No Yes   Sig: Take 1 Tablet by mouth daily. tamsulosin (FLOMAX) 0.4 mg capsule 2022 at Unknown time  Yes Yes   Sig: Take 0.4 mg by mouth daily.       Facility-Administered Medications: None       Hospital Medications:  Current Facility-Administered Medications   Medication Dose Route Frequency    0.9% sodium chloride infusion  125 mL/hr IntraVENous CONTINUOUS    metoprolol succinate (TOPROL-XL) XL tablet 50 mg  50 mg Oral DAILY    [Held by provider] enoxaparin (LOVENOX) injection 40 mg  40 mg SubCUTAneous DAILY    amLODIPine (NORVASC) tablet 10 mg  10 mg Oral DAILY    aspirin delayed-release tablet 81 mg  81 mg Oral EVERY OTHER DAY    atorvastatin (LIPITOR) tablet 20 mg  20 mg Oral DAILY    cetirizine (ZYRTEC) tablet 10 mg  10 mg Oral DAILY    magnesium oxide (MAG-OX) tablet 400 mg  400 mg Oral DAILY    lisinopriL (PRINIVIL, ZESTRIL) tablet 10 mg  10 mg Oral DAILY    pantoprazole (PROTONIX) tablet 40 mg  40 mg Oral ACB    tamsulosin (FLOMAX) capsule 0.4 mg  0.4 mg Oral DAILY    nitroglycerin (NITROBID) 2 % ointment 1 Inch  1 Inch Topical BID PRN          OBJECTIVE       Laboratory and Imaging have been reviewed and are notable for      ECG:  Date: 6/28/2022 sinus rhythm with right bundle branch block first-degree heart block LAD occasional PACs with aberrant conduction      Diagnostic Tests:     No results for input(s): CPK, CKMB, TROIQ in the last 72 hours. No lab exists for component: CKQMB, CPKMB  Recent Labs     07/01/22  0120 06/30/22  0100 06/29/22  0148    140 139   K 3.8 3.8 3.6   CO2 27 25 25   BUN 20 14 14   CREA 0.97 0.91 0.77   * 126* 136*   MG 1.8 1.8 1.9   WBC 5.9 6.4 6.0   HGB 13.8 14.9 14.5   HCT 42.1 44.8 43.9    183 188         Cardiac work up to date:    1) cardiac catheterization  6/13: LAD 99% ROQUE to LAD    2) echocardiogram  1/12/2022: EF 55-60%, mild MR, left atrial argument, right atrial enlargement, mild ascending aorta 3.9 cm    3) stress test  Exercise Cardiolite (2/19)-7 METS no ischemia normal EF    4) carotid studies  (1/19)-less than 50% ICA stenosis bilateral    5) cholesterol  3/2/2022:  HDL 48, LDL 57,                      Social History:  Social History     Tobacco Use    Smoking status: Never Smoker    Smokeless tobacco: Never Used   Substance Use Topics    Alcohol use: Not Currently       Family History:  Family History   Problem Relation Age of Onset    Cancer Mother     Heart Disease Father        Review of Symptoms:  A comprehensive review of systems was negative except for that written in the HPI. Physical Exam:      Visit Vitals  /72 (BP 1 Location: Right upper arm, BP Patient Position: At rest;Sitting)   Pulse 72   Temp 97.4 °F (36.3 °C)   Resp 17   Ht 5' 11\" (1.803 m)   Wt 93 kg (205 lb 0.4 oz)   SpO2 95%   BMI 28.60 kg/m²     General Appearance:  Well developed, well nourished,alert and oriented x 3, and individual in no acute distress. Ears/Nose/Mouth/Throat:   Hearing grossly normal.Normal oral mucosa,no scleral icterus     Neck: Supple no JVD or bruits,  +  left carotid bruit   Chest:   Lungs clear to auscultation bilaterally,  no rales rhonchi or wheezing   Cardiovascular:  Regular rate and rhythm, frequent ectopy    Abdomen:   Soft, non-tender, bowel sounds are active. No abdominal bruits   Extremities: No edema bilaterally. Pulses detected, no varicosities   Skin: Warm and dry. No bruising  Neuro  Moves all extermities and neurologically intact                                                       I have discussed the diagnosis with the patient and the intended plan as seen in the above orders. Questions were answered concerning future plans. I have discussed medication side effects and warnings with the patient as well. Darling Bean is in agreement to the plan listed above and wishes to proceed. he  was instructed not to smoke, eat heart healthy diet  and to exercise. Thang De Leon, NP     Cardiology Attestation note    I spent approximately 20 minutes reviewing the chart in about 15 to 20 minutes and discussion of cardiac catheterization with patient. SUBJECTIVE:    He was seen for chest discomfort and had negative troponins and because of his history of CAD and in stable pattern and his risk factors he underwent a cardiac catheterization. He was seen this morning and this is a follow-up note and was noted that he had insignificant nonobstructive coronary artery disease in the previously stented area was patent. Visit Vitals  /72 (BP 1 Location: Left upper arm, BP Patient Position: At rest;Sitting)   Pulse 74   Temp 97.6 °F (36.4 °C)   Resp 17   Ht 5' 11\" (1.803 m)   Wt 205 lb 0.4 oz (93 kg)   SpO2 97%   BMI 28.60 kg/m²       PE:    General: Well developed, in no acute distress.   HEENT: No jaundice, oral mucosa moist, no oral ulcers  Neck: Supple, no stiffness, no lymphadenopathy, supple left carotid bruit  Heart:  Normal S1/S2 negative S3 or S4. Regular, no murmur, gallop or rub, no jugular venous distention  Respiratory: Clear bilaterally x 4, no wheezing or rales  Musculoskeletal: No clubbing, no deformities  Neuro: A&Ox3, speech clear, gait stable, cooperative, no focal neurologic deficits  Skin: Skin color is normal. No rashes or lesions. Non diaphoretic, moist.          Impression:  1. Chest discomfort  2. CAD status post stenting in 2013  3. Dyslipidemia  4. Mild aortic stenosis  5. Hypertension    Plan/Recommendation:  1. No obstructive CAD on cath today  2. Work on risk factor modification with blood pressure control and lowering LDL below 70  3. Okay for discharge per cardiology      I saw and evaluated the patient, performing the key elements of the service. I discussed the findings, assessment and plan with the NP and agree with the NP's findings and plan as documented in the NPs note.         Kamilla Mckeon MD

## 2022-07-01 NOTE — PROGRESS NOTES
0700: Bedside shift change report given to Prateek Graham RN (oncoming nurse) by Robert Jones RN (offgoing nurse). Report included the following information SBAR, Kardex, ED Summary, Procedure Summary, Intake/Output, MAR, Recent Results, Med Rec Status and Cardiac Rhythm NSR, loretta. 1601: Discharge paperwork went over with patient and son in law. All questions answered. Paperwork signed and placed into chart.

## 2022-07-01 NOTE — DISCHARGE INSTRUCTIONS
HOME DISCHARGE INSTRUCTIONS    Korin Hendricks / [id number] : 1935    Admission date: 2022 Discharge date: 2022     Please bring this form with you to show your care provider at your follow-up appointment. Primary care provider:  Afsaneh Finn MD    Discharging provider:  Lloyd Ruiz DO  - Family Medicine Resident  Dr. Felipa Schlatter - Attending, Family Medicine     You have been admitted to the hospital with the following diagnoses:    ACUTE DIAGNOSES:  Angina pectoris ([address] [I20.9] (Chest pain)      You were admitted to the hospital because of chest pain that was made worse by physical activity. We did an EKG and Echocardiogram which did not show any changes. The cardiologists performed a stress test which showed some abnormal results, so they scheduled you for a left heart catheterization to see if there were any blocked arteries that needed to be opened up. The heart catheterization was successful and the cardiologist decided you did not require a stent. We are discharging you to follow up outpatient with your PCP and cardiologist.     Appointments  Follow-up Information     Follow up With Specialties Details Why Contact Info    Radha Dey DO Family Medicine On 2022 At 1:15pm for hospital follow up [address]   [address]  [phone]      Johanna Neville MD Cardiovascular Disease Physician On 2022 3:00 pm [address] [address] [phone]           MEDICATION CHANGES:    CHANGE: Metoprolol 50 mg per mouth daily, this has been decreased from 100 mg PO daily. Please follow up with your PCP regarding:  - Chest Pain  - Low heart rate   - Baker's Cyst in the back of your R leg.      Please follow up with Cardiologist regarding:  - Chest Pain  - Low heart rate    Follow-up tests needed: None   Patient Education          Specific symptoms to watch for: chest pain, shortness of breath, fever, chills, nausea, vomiting, diarrhea, change in mentation, falling, weakness, bleeding. DIET/what to eat:  Cardiac Diet. Refer to attached \"A healthy heart\" document for instruction on what diet is best to take care of your heart. ACTIVITY:  Continue your normal level of activity. What to do if new or unexpected symptoms occur? If you experience any of the above symptoms (or should other concerns or questions arise after discharge) please call your primary care physician. Return to the emergency room if you cannot get hold of your doctor. · It is very important that you keep your follow-up appointment(s). · Please bring discharge papers, medication list (and/or medication bottles) to your follow-up appointments for review by your outpatient provider(s). · Please check the list of medications and be sure it includes every medication (even non-prescription medications) that your provider wants you to take. · It is important that you take the medication exactly as they are prescribed. · Keep your medication in the bottles provided by the pharmacist and keep a list of the medication names, dosages, and times to be taken in your wallet. · Do not take other medications without consulting your doctor. · If you have any questions about your medications or other instructions, please talk to your nurse or care provider before you leave the hospital.     Information obtained by:     I understand that if any problems occur once I am at home I am to contact my physician. These instructions were explained to me and I had the opportunity to ask questions. I understand and acknowledge receipt of the instructions indicated above.                                                                                                                                                Physician's or R.N.'s Signature                                                                  Date/Time Patient or Representative Signature                                                          Date/Time    Radial Cardiac Catheterization/Angiography Discharge Instructions   It is normal to feel tired the first couple days. Take it easy and follow the physicians instructions. CHECK THE CATHETER INSERTION SITE DAILY:   Remove the wrist dressing 24 hours after the procedure. You may shower 24 hours after the procedure. Wash with soap and water and pat dry. Gentle cleaning of the site with soap and water is sufficient, cover with a dry clean dressing or bandage. Do not apply creams or powders to the area. No soaking the wrist for 3 days. Leave the puncture site open to air after 24 hours post-procedure. CALL THE PHYSICIANS:   If the site becomes red, swollen or feels warm to the touch   If there is bleeding or drainage or if there is unusual pain at the radial site. If there is any minor oozing, you may apply a band-aid and remove after 12 hours. If the bleeding continues, hold pressure with the middle finger against the puncture site and the thumb against the back of the wrist,call 911 to be transported to the hospital.   DO NOT DRIVE YOURSELF, \[facility]\"" 834. ACTIVITY:   For the first 24 hours do not manipulate the wrist.   No lifting, pushing or pulling over 3-5 pounds with the affected wrist for 7 daysand no straining the insertion site. Do not life grocery bags or the garbage can, do not run the vacuum  or  for 7 days. Start with short walks as in the hospital and gradually increase as tolerated each day. It is recommended to walk 30 minutes 5-7 days per week. Follow your physicians instructions on activity. Avoid walking outside in extremes of heat or cold. Walk inside when it is cold and windy or hot and humid.    Things to keep in mind:   No driving for at least 24 hours, or as designated by your physician. Limit the number of times you go up and down the stairs   Take rests and pace yourself with activity. Be careful and do not strain with bowel movements. MEDICATIONS:   Take all medications as prescribed   Call your physician if you have any questions   Keep an updated list of your medications with you at all times and give a list to your physician and pharmacist   SIGNS AND SYMPTOMS:   Be cautious of symptoms of angina or recurrent symptoms such as chest discomfort, unusual shortness of breath or fatigue. These could be symptoms of restenosis, a new blockage or a heart attack. If your symptoms are relieved with rest it is still recommended that you notify your physician of recurrent chest pain or discomfort. For CHEST PAIN or symptoms of angina not relieved with rest: If the discomfort is not relieved with rest, and you have been prescribed Nitroglycerin, take as directed (taken under the tongue, one at a time 5 minutes apart for a total of 3 doses). If the discomfort is not relieved after the 3rd nitroglycerin, call 911. If you have not been prescribed Nitroglycerin and your chest discomfort is not relieved with rest, call 911. AFTER CARE:   Follow up with your physician as instructed. Follow a heart healthy diet with proper portion control, daily stress management, daily exercise, blood pressure and cholesterol control , and smoking cessation. Patient Education        Angina: Care Instructions  Your Care Instructions     You have a problem called angina. Angina happens when there is not enough blood flow to your heart muscle. Angina is a sign of coronary artery disease (CAD). CAD occurs when blood vessels that supply the heart become narrowed. Having CAD increases your risk of a heart attack. Chest pain or pressure is the most common symptom of angina.  But some people have other symptoms, like:  · Pain, pressure, or a strange feeling in the back, neck, jaw, or upper belly, or in one or both shoulders or arms. · Shortness of breath. · Nausea or vomiting. · Lightheadedness or sudden weakness. · Fast or irregular heartbeat. Women are somewhat more likely than men to have angina symptoms like shortness of breath, nausea, and back or jaw pain. Angina can be dangerous. That's why it is important to pay attention to your symptoms. Know what is typical for you, learn how to control your symptoms, and understand when you need to get treatment. A change in your usual pattern of symptoms is an emergency. It may mean that you are having a heart attack. The doctor has checked you carefully, but problems can develop later. If you notice any problems or new symptoms, get medical treatment right away. Follow-up care is a key part of your treatment and safety. Be sure to make and go to all appointments, and call your doctor if you are having problems. It's also a good idea to know your test results and keep a list of the medicines you take. How can you care for yourself at home? Medicines    · If your doctor has given you nitroglycerin for angina symptoms, keep it with you at all times. If you have symptoms, sit down and rest, and take the first dose of nitroglycerin as directed. If your symptoms get worse or are not getting better within 5 minutes, call 911 right away. Stay on the phone. The emergency  will give you further instructions.     · If your doctor advises it, take 1 low-dose aspirin a day to prevent heart attack.     · Be safe with medicines. Take your medicines exactly as prescribed. Call your doctor if you think you are having a problem with your medicine. You will get more details on the specific medicines your doctor prescribes. Lifestyle changes    · Do not smoke. If you need help quitting, talk to your doctor about stop-smoking programs and medicines.  These can increase your chances of quitting for good.     · Eat a heart-healthy diet that is low in saturated fat and salt, and is high in fiber. Talk to your doctor or a dietitian about healthy eating.     · Stay at a healthy weight. Or lose weight if you need to. Activity    · Talk to your doctor about a level of activity that is safe for you.     · If an activity causes angina symptoms, stop and rest.   When should you call for help? Call 911 anytime you think you may need emergency care. For example, call if:    · You passed out (lost consciousness).     · You have symptoms of a heart attack. These may include:  ? Chest pain or pressure, or a strange feeling in the chest.  ? Sweating. ? Shortness of breath. ? Nausea or vomiting. ? Pain, pressure, or a strange feeling in the back, neck, jaw, or upper belly or in one or both shoulders or arms. ? Lightheadedness or sudden weakness. ? A fast or irregular heartbeat. After you call 911, the  may tell you to chew 1 adult-strength or 2 to 4 low-dose aspirin. Wait for an ambulance. Do not try to drive yourself.     · You have angina symptoms that do not go away with rest or are not getting better within 5 minutes after you take a dose of nitroglycerin. Call your doctor now if:    · Your angina symptoms seem worse but still follow your typical pattern. You can predict when symptoms will happen, but they may come on sooner, feel worse, or last longer.     · You feel dizzy or lightheaded, or you feel like you may faint. Watch closely for changes in your health, and be sure to contact your doctor if you have any problems. Where can you learn more? Go to http://www.gray.com/  Enter H129 in the search box to learn more about \"Angina: Care Instructions. \"  Current as of: January 10, 2022               Content Version: 13.2  © 5126-3803 [employer]. Care instructions adapted under license by [employer] (which disclaims liability or warranty for this information).  If you have questions about a medical condition or this instruction, always ask your healthcare professional. [place] [address] any warranty or liability for your use of this information.

## 2022-07-01 NOTE — PROGRESS NOTES
Physical Therapy    Attempted to work with patient today but currently PATRICIA undergoing cardiac cath. Will f/u later as able and appropriate.     Vidal Quinonez MS, PT

## 2022-07-01 NOTE — PROGRESS NOTES
Bedside and Verbal shift change report given to Betito (oncoming nurse) by Maddie Goel (offgoing nurse). Report included the following information SBAR, Kardex, ED Summary, Procedure Summary, Intake/Output, MAR, Recent Results and Med Rec Status.

## 2022-07-05 ENCOUNTER — PATIENT OUTREACH (OUTPATIENT)
Dept: CASE MANAGEMENT | Age: 87
End: 2022-07-05

## 2022-07-06 ENCOUNTER — TELEPHONE (OUTPATIENT)
Dept: FAMILY MEDICINE CLINIC | Age: 87
End: 2022-07-06

## 2022-07-06 NOTE — TELEPHONE ENCOUNTER
Marie Monge from [facility] called in and is asking if you would follow pt for PT & OT. Call back number is [phone]. Thanks.

## 2022-07-06 NOTE — PROGRESS NOTES
Care Transitions Initial Call    Call within 2 business days of discharge: Yes     Patient: Carly Barth Patient : 1935 MRN: [id number]    Last Discharge [address]       Complaint Diagnosis Description Type Department Provider    22 Chest Pain Angina pectoris ([facility] [address] . .. ED to Hosp-Admission (Discharged) (ADMIT) Justine Bowen MD; Veena Marcum . .. Was this an external facility discharge? No Discharge Facility: [facility]    Challenges to be reviewed by the provider   Additional needs identified to be addressed with provider: no         Method of communication with provider : none    Discussed COVID-19 related testing which was not done at this time. Advance Care Planning:   Does patient have an Advance Directive: not on file. Inpatient Readmission Risk score: Unplanned Readmit Risk Score: 7.4 ( )    Was this a readmission? no   Patient stated reason for the admission: \" chest pain\"    Patients top risk factors for readmission: none noted   Interventions to address risk factors: Obtained and reviewed discharge summary and/or continuity of care documents    Care Transition Nurse (CTN) contacted the patient by telephone to perform post hospital discharge assessment. Verified name and  with patient as identifiers. Provided introduction to self, and explanation of the CTN role. CTN reviewed discharge instructions, medical action plan and red flags with patient who verbalized understanding. Were discharge instructions available to patient? yes. Reviewed appropriate site of care based on symptoms and resources available to patient including: PCP, Specialist, [facility] and When to call 911. Patient given an opportunity to ask questions and does not have any further questions or concerns at this time. The patient agrees to contact the PCP office for questions related to their healthcare.      Medication reconciliation was performed with patient, who verbalizes understanding of administration of home medications. Advised obtaining a 90-day supply of all daily and as-needed medications. Referral to Pharm D needed: no     Home Health/Outpatient orders at discharge: home health care, PT and [address]: [address] [place] [address]   Date of initial visit: 7/5/2022    Durable Medical Equipment ordered at discharge: None    Was patient discharged with a pulse oximeter? no    Discussed follow-up appointments. If no appointment was previously scheduled, appointment scheduling offered: yes. Is follow up appointment scheduled within 7 days of discharge? yes. [facility] follow up appointment(s):   Future Appointments   Date Time Provider Donavan Adan   7/7/2022  1:15 PM Bridget Hensley DO IFP BS [facility]   7/13/2022  9:20 AM MD RACIEL Weber BS [facility]   7/19/2022  9:30 AM MD ABISAI Lan [facility]   10/12/2022  8:30 AM Vance Davenport MD IFP BS [facility] follow up appointment(s): Dermatology 7/6/2022    Plan for follow-up call in 5-7 days based on severity of symptoms and risk factors. Plan for next call: ACP  CTN provided contact information for future needs. Goals Addressed                 This Visit's Progress     ACP        7/6/2022  -No ACP on file. -CTN to discuss next call  SP       Attends follow-up appointments as directed.         7/6/2022  -Will attend Dermatology follow up on 7/6  -Will attend PCP follow up on 7/7  -Will attend Cardiology follow up on 7/13  -CTN to follow up in 1 week  SP

## 2022-07-07 ENCOUNTER — OFFICE VISIT (OUTPATIENT)
Dept: FAMILY MEDICINE CLINIC | Age: 87
End: 2022-07-07
Payer: MEDICARE

## 2022-07-07 VITALS
SYSTOLIC BLOOD PRESSURE: 105 MMHG | OXYGEN SATURATION: 96 % | DIASTOLIC BLOOD PRESSURE: 62 MMHG | HEART RATE: 71 BPM | BODY MASS INDEX: 29.54 KG/M2 | RESPIRATION RATE: 18 BRPM | TEMPERATURE: 98.1 F | WEIGHT: 211 LBS | HEIGHT: 71 IN

## 2022-07-07 DIAGNOSIS — Z09 HOSPITAL DISCHARGE FOLLOW-UP: ICD-10-CM

## 2022-07-07 DIAGNOSIS — Z00.00 MEDICARE ANNUAL WELLNESS VISIT, SUBSEQUENT: ICD-10-CM

## 2022-07-07 DIAGNOSIS — I25.118 CORONARY ARTERY DISEASE OF NATIVE ARTERY OF NATIVE HEART WITH STABLE ANGINA PECTORIS (HCC): Primary | ICD-10-CM

## 2022-07-07 DIAGNOSIS — I10 PRIMARY HYPERTENSION: ICD-10-CM

## 2022-07-07 DIAGNOSIS — E11.9 DIABETES MELLITUS TYPE 2, DIET-CONTROLLED (HCC): ICD-10-CM

## 2022-07-07 LAB — HBA1C MFR BLD HPLC: 6 %

## 2022-07-07 PROCEDURE — G8427 DOCREV CUR MEDS BY ELIG CLIN: HCPCS | Performed by: FAMILY MEDICINE

## 2022-07-07 PROCEDURE — 1111F DSCHRG MED/CURRENT MED MERGE: CPT | Performed by: FAMILY MEDICINE

## 2022-07-07 PROCEDURE — 83036 HEMOGLOBIN GLYCOSYLATED A1C: CPT | Performed by: FAMILY MEDICINE

## 2022-07-07 PROCEDURE — G8536 NO DOC ELDER MAL SCRN: HCPCS | Performed by: FAMILY MEDICINE

## 2022-07-07 PROCEDURE — 99495 TRANSJ CARE MGMT MOD F2F 14D: CPT | Performed by: FAMILY MEDICINE

## 2022-07-07 PROCEDURE — G8510 SCR DEP NEG, NO PLAN REQD: HCPCS | Performed by: FAMILY MEDICINE

## 2022-07-07 PROCEDURE — 1101F PT FALLS ASSESS-DOCD LE1/YR: CPT | Performed by: FAMILY MEDICINE

## 2022-07-07 PROCEDURE — G8417 CALC BMI ABV UP PARAM F/U: HCPCS | Performed by: FAMILY MEDICINE

## 2022-07-07 RX ORDER — METOPROLOL SUCCINATE 50 MG/1
50 TABLET, EXTENDED RELEASE ORAL DAILY
Qty: 90 TABLET | Refills: 3 | Status: SHIPPED | OUTPATIENT
Start: 2022-07-07 | End: 2022-07-13 | Stop reason: SDUPTHER

## 2022-07-07 NOTE — PATIENT INSTRUCTIONS
A Healthy Lifestyle: Care Instructions  Your Care Instructions     A healthy lifestyle can help you feel good, stay at a healthy weight, and have plenty of energy for both work and play. A healthy lifestyle is something you can share with your whole family. A healthy lifestyle also can lower your risk for serious health problems, such as high blood pressure, heart disease, and diabetes. You can follow a few steps listed below to improve your health and the health of your family. Follow-up care is a key part of your treatment and safety. Be sure to make and go to all appointments, and call your doctor if you are having problems. It's also a good idea to know your test results and keep a list of the medicines you take. How can you care for yourself at home? · Do not eat too much sugar, fat, or fast foods. You can still have dessert and treats now and then. The goal is moderation. · Start small to improve your eating habits. Pay attention to portion sizes, drink less juice and soda pop, and eat more fruits and vegetables. ? Eat a healthy amount of food. A 3-ounce serving of meat, for example, is about the size of a deck of cards. Fill the rest of your plate with vegetables and whole grains. ? Limit the amount of soda and sports drinks you have every day. Drink more water when you are thirsty. ? Eat plenty of fruits and vegetables every day. Have an apple or some carrot sticks as an afternoon snack instead of a candy bar. Try to have fruits and/or vegetables at every meal.  · Make exercise part of your daily routine. You may want to start with simple activities, such as walking, bicycling, or slow swimming. Try to be active 30 to 60 minutes every day. You do not need to do all 30 to 60 minutes all at once. For example, you can exercise 3 times a day for 10 or 20 minutes.  Moderate exercise is safe for most people, but it is always a good idea to talk to your doctor before starting an exercise program.  · Keep moving. [place] the lawn, work in the garden, or [employer]. Take the stairs instead of the elevator at work. · If you smoke, quit. People who smoke have an increased risk for heart attack, stroke, cancer, and other lung illnesses. Quitting is hard, but there are ways to boost your chance of quitting tobacco for good. ? Use nicotine gum, patches, or lozenges. ? Ask your doctor about stop-smoking programs and medicines. ? Keep trying. In addition to reducing your risk of diseases in the future, you will notice some benefits soon after you stop using tobacco. If you have shortness of breath or asthma symptoms, they will likely get better within a few weeks after you quit. · Limit how much alcohol you drink. Moderate amounts of alcohol (up to 2 drinks a day for men, 1 drink a day for women) are okay. But drinking too much can lead to liver problems, high blood pressure, and other health problems. Family health  If you have a family, there are many things you can do together to improve your health. · Eat meals together as a family as often as possible. · Eat healthy foods. This includes fruits, vegetables, lean meats and dairy, and whole grains. · Include your family in your fitness plan. Most people think of activities such as jogging or tennis as the way to fitness, but there are many ways you and your family can be more active. Anything that makes you breathe hard and gets your heart pumping is exercise. Here are some tips:  ? Walk to do errands or to take your child to school or the bus.  ? Go for a family bike ride after dinner instead of watching TV. Where can you learn more? Go to http://www.gray.com/  Enter Q512 in the search box to learn more about \"A Healthy Lifestyle: Care Instructions. \"  Current as of: June 16, 2021               Content Version: 13.2  © 6957-3024 Healthwise, Incorporated.    Care instructions adapted under license by [facility] (which disclaims liability or warranty for this information). If you have questions about a medical condition or this instruction, always ask your healthcare professional. [address] any warranty or liability for your use of this information. Medicare Wellness Visit, Male    The best way to live healthy is to have a lifestyle where you eat a well-balanced diet, exercise regularly, limit alcohol use, and quit all forms of tobacco/nicotine, if applicable. Regular preventive services are another way to keep healthy. Preventive services (vaccines, screening tests, monitoring & exams) can help personalize your care plan, which helps you manage your own care. Screening tests can find health problems at the earliest stages, when they are easiest to treat. [facility] follows the current, evidence-based guidelines published by the [facility] ([facility]) when recommending preventive services for our patients. Because we follow these guidelines, sometimes recommendations change over time as research supports it. (For example, a prostate screening blood test is no longer routinely recommended for men with no symptoms). Of course, you and your doctor may decide to screen more often for some diseases, based on your risk and co-morbidities (chronic disease you are already diagnosed with). Preventive services for you include:  - Medicare offers their members a free annual wellness visit, which is time for you and your primary care provider to discuss and plan for your preventive service needs. Take advantage of this benefit every year!  -All adults over age 72 should receive the recommended pneumonia vaccines.  Current USPSTF guidelines recommend a series of two vaccines for the best pneumonia protection.   -All adults should have a flu vaccine yearly and tetanus vaccine every 10 years.  -All adults age 48 and older should receive the shingles vaccines (series of two vaccines). -All adults age 38-68 who are overweight should have a diabetes screening test once every three years.   -Other screening tests & preventive services for persons with diabetes include: an eye exam to screen for diabetic retinopathy, a kidney function test, a foot exam, and stricter control over your cholesterol.   -Cardiovascular screening for adults with routine risk involves an electrocardiogram (ECG) at intervals determined by the provider.   -Colorectal cancer screening should be done for adults age 54-65 with no increased risk factors for colorectal cancer. There are a number of acceptable methods of screening for this type of cancer. Each test has its own benefits and drawbacks. Discuss with your provider what is most appropriate for you during your annual wellness visit. The different tests include: colonoscopy (considered the best screening method), a fecal occult blood test, a fecal DNA test, and sigmoidoscopy.  -All adults born between [place] should be screened once for Hepatitis C.  -An Abdominal Aortic Aneurysm (AAA) Screening is recommended for men age 73-68 who has ever smoked in their lifetime.      Here is a list of your current Health Maintenance items (your personalized list of preventive services) with a due date:  Health Maintenance Due   Topic Date Due    Pneumococcal Vaccine (1 - PCV) Never done    Diabetic Foot Care  Never done    Albumin Urine Test  Never done    Eye Exam  Never done    DTaP/Tdap/Td  (1 - Tdap) Never done    Shingles Vaccine (1 of 2) Never done

## 2022-07-07 NOTE — PROGRESS NOTES
Progress Note    he is a 81y.o. year old male who presents for evalution. Subjective:     Patient here for follow-up from recent hospitalization at [address] [place] [address]. Discharge summary reviewed labs reviewed. Patient was painting when he sat down and started getting sharp chest pain took a nitro repeat in 5 minutes pain went away went to the emergency department. Symptoms seem consistent with angina. He underwent cardiac catheterization which showed no significant blockage patent stent from 2013. LDL less than 70 on recent labs in March. Patient is a diabetic recent A1c was 6.6 in March which was an increase from 5.9 the prior check. His heart rate was also found to be low therefore his metoprolol was decreased from 100 extended release to 50 mg extended release which she is doing fine with. Had inducible ischemia on stress test so got cath. Has not had any further CP since this incident. He has been working on his weight and exercise. HTN is well controlled. Reviewed PmHx, RxHx, FmHx, SocHx, AllgHx and updated and dated in the chart. Review of Systems - negative except as listed above in the HPI    Objective:     Vitals:    07/07/22 1322   BP: 105/62   Pulse: 71   Resp: 18   Temp: 98.1 °F (36.7 °C)   TempSrc: Oral   SpO2: 96%   Weight: 211 lb (95.7 kg)   Height: 5' 11\" (1.803 m)       Current Outpatient Medications   Medication Sig    metoprolol succinate (TOPROL-XL) 50 mg XL tablet Take 1 Tablet by mouth daily.  mirabegron ER (MYRBETRIQ) 50 mg ER tablet Take 50 mg by mouth daily.  pantoprazole (PROTONIX) 40 mg tablet Take 1 Tablet by mouth daily.  amLODIPine (NORVASC) 10 mg tablet Take 1 Tablet by mouth daily.  metFORMIN ER (GLUCOPHAGE XR) 500 mg tablet Take 1 Tablet by mouth daily (with dinner).  lisinopriL (PRINIVIL, ZESTRIL) 10 mg tablet Take 1 Tablet by mouth daily.  tamsulosin (FLOMAX) 0.4 mg capsule Take 0.4 mg by mouth daily.     cpap machine kit by Does Not Apply route.  cinnamon bark (Cinnamon) 500 mg cap Take 1,000 mg by mouth two (2) times a day.  MOMETASONE FUROATE, BULK, by Does Not Apply route. Topical on scapl daily.  atorvastatin (LIPITOR) 20 mg tablet Take 1 Tablet by mouth daily.  aspirin delayed-release 81 mg tablet Take 81 mg by mouth every other day.  cetirizine HCl (ALLER-ABRAN PO) Take 1 Tablet by mouth daily.  nitroglycerin (NITROSTAT) 0.4 mg SL tablet 0.4 mg by SubLINGual route every five (5) minutes as needed for Chest Pain. Up to 3 doses.  docosahexaenoic acid/epa (FISH OIL PO) Take 300 mg by mouth two (2) times a day.  magnesium oxide (MAG-OX) 400 mg tablet Take 400 mg by mouth daily. No current facility-administered medications for this visit. Physical Examination: General appearance - alert, well appearing, and in no distress  Mental status - alert, oriented to person, place, and time  Chest - clear to auscultation, no wheezes, rales or rhonchi, symmetric air entry  Heart - normal rate, regular rhythm, normal S1, S2, no murmurs, rubs, clicks or gallops      Assessment/ Plan:   Diagnoses and all orders for this visit:    1. Coronary artery disease of native artery of native heart with stable angina pectoris (HCC)  -     metoprolol succinate (TOPROL-XL) 50 mg XL tablet; Take 1 Tablet by mouth daily. Risk factor modification which he is reaching goals will ideally get a1c back to 5's  2. Diabetes mellitus type 2, diet-controlled (HCC)  -     AMB POC HEMOGLOBIN A1C  6.0 doing well  3. Primary hypertension  -     metoprolol succinate (TOPROL-XL) 50 mg XL tablet; Take 1 Tablet by mouth daily. 4. Medicare annual wellness visit, subsequent       Follow-up and Dispositions    · Return in 4 months (on 11/7/2022), or if symptoms worsen or fail to improve. I have discussed the diagnosis with the patient and the intended plan as seen in the above orders.   The patient has received an after-visit summary and questions were answered concerning future plans. Pt conveyed understanding of plan. Medication Side Effects and Warnings were discussed with patient    An electronic signature was used to authenticate this note  Joshau Pérez, DO    This is the Subsequent Medicare Annual Wellness Exam, performed 12 months or more after the Initial AWV or the last Subsequent AWV    I have reviewed the patient's medical history in detail and updated the computerized patient record. Assessment/Plan   Education and counseling provided:  Are appropriate based on today's review and evaluation    1. Coronary artery disease of native artery of native heart with stable angina pectoris (HCC)  -     metoprolol succinate (TOPROL-XL) 50 mg XL tablet; Take 1 Tablet by mouth daily. , Normal, Disp-90 Tablet, R-3  2. Diabetes mellitus type 2, diet-controlled (HCC)  -     AMB POC HEMOGLOBIN A1C  3. Primary hypertension  -     metoprolol succinate (TOPROL-XL) 50 mg XL tablet; Take 1 Tablet by mouth daily. , Normal, Disp-90 Tablet, R-3  4. Medicare annual wellness visit, subsequent       Depression Risk Factor Screening     3 most recent PHQ Screens 7/7/2022   Little interest or pleasure in doing things Not at all   Feeling down, depressed, irritable, or hopeless Not at all   Total Score PHQ 2 0       Alcohol & Drug Abuse Risk Screen    Do you average more than 1 drink per night or more than 7 drinks a week: No    In the past three months have you have had more than 4 drinks containing alcohol on one occasion: No          Functional Ability and Level of Safety    Hearing: The patient wears hearing aids. Activities of Daily Living: The home contains: grab bars  Patient does total self care      Ambulation: with mild difficulty     Fall Risk:  Fall Risk Assessment, last 12 mths 3/2/2022   Able to walk? Yes   Fall in past 12 months? 1   Do you feel unsteady? 0   Are you worried about falling 0   Is TUG test greater than 12 seconds?  0   Is the gait abnormal? 0   Number of falls in past 12 months 1   Fall with injury? 0      Abuse Screen:  Patient is not abused       Cognitive Screening    Has your family/caregiver stated any concerns about your memory: no         Health Maintenance Due     Health Maintenance Due   Topic Date Due    Pneumococcal 65+ years (1 - PCV) Never done    Foot Exam Q1  Never done    MICROALBUMIN Q1  Never done    Eye Exam Retinal or Dilated  Never done    DTaP/Tdap/Td series (1 - Tdap) Never done    Shingrix Vaccine Age 50> (1 of 2) Never done       Patient Care Team   Patient Care Team:  Ashley Moreland MD as PCP - General (Family Medicine)  Ashley Moreland MD [facility] PCP [facility]  Brittany Caraballo RN as Care Transitions Nurse    History     Patient Active Problem List   Diagnosis Code    CAD (coronary artery disease) I25.10    BPH (benign prostatic hyperplasia) N40.0    HTN (hypertension) I10    Dyslipidemia E78.5    History of hematuria Z87.448    GERD (gastroesophageal reflux disease) K21.9    Grieving F43.21    Diabetes mellitus type 2, diet-controlled ([facility] [address] E11.9    Blunt trauma of face S09. 93XA     Past Medical History:   Diagnosis Date    CAD (coronary artery disease)     NSTEMI ---> Cath 6/13 - LAD 99% ---> ROQUE to LAD     Dyslipidemia     Factor 5 Leiden mutation, heterozygous ([facility] [address]     H/O carpal tunnel repair     HTN (hypertension)     Prostate CA ([facility] [address]     radiation treatments       Past Surgical History:   Procedure Laterality Date    HX CORONARY STENT PLACEMENT  2013    HX OPEN REDUCTION INTERNAL FIXATION Left 05/2021    left patella    RI CARDIAC SURG PROCEDURE UNLIST       Current Outpatient Medications   Medication Sig Dispense Refill    metoprolol succinate (TOPROL-XL) 50 mg XL tablet Take 1 Tablet by mouth daily. 90 Tablet 3    mirabegron ER (MYRBETRIQ) 50 mg ER tablet Take 50 mg by mouth daily.  pantoprazole (PROTONIX) 40 mg tablet Take 1 Tablet by mouth daily.  80 Tablet 3    amLODIPine (NORVASC) 10 mg tablet Take 1 Tablet by mouth daily. 90 Tablet 3    metFORMIN ER (GLUCOPHAGE XR) 500 mg tablet Take 1 Tablet by mouth daily (with dinner). 90 Tablet 1    lisinopriL (PRINIVIL, ZESTRIL) 10 mg tablet Take 1 Tablet by mouth daily. 90 Tablet 3    tamsulosin (FLOMAX) 0.4 mg capsule Take 0.4 mg by mouth daily.  cpap machine kit by Does Not Apply route.  cinnamon bark (Cinnamon) 500 mg cap Take 1,000 mg by mouth two (2) times a day.  MOMETASONE FUROATE, BULK, by Does Not Apply route. Topical on scapl daily.  atorvastatin (LIPITOR) 20 mg tablet Take 1 Tablet by mouth daily. 90 Tablet 3    aspirin delayed-release 81 mg tablet Take 81 mg by mouth every other day.  cetirizine HCl (ALLER-ABRAN PO) Take 1 Tablet by mouth daily.  nitroglycerin (NITROSTAT) 0.4 mg SL tablet 0.4 mg by SubLINGual route every five (5) minutes as needed for Chest Pain. Up to 3 doses.  docosahexaenoic acid/epa (FISH OIL PO) Take 300 mg by mouth two (2) times a day.  magnesium oxide (MAG-OX) 400 mg tablet Take 400 mg by mouth daily.        No Known Allergies    Family History   Problem Relation Age of Onset    Cancer Mother     Heart Disease Father      Social History     Tobacco Use    Smoking status: Never Smoker    Smokeless tobacco: Never Used   Substance Use Topics    Alcohol use: Not Currently         Brent Garcia, DO

## 2022-07-07 NOTE — PROGRESS NOTES
Chief Complaint   Patient presents with   [facility] Follow Up     Patient presents in office today for CARLITO f/u from [facility]. Admitted on 6/27/22 for chest pain. Discharged on 7/1/22  No concerns. 1. Have you been to the ER, urgent care clinic since your last visit? Hospitalized since your last visit? Yes When: 6/27/22 Where: [facility] Reason for visit: chest pain    2. Have you seen or consulted any other health care providers outside of the [address] [place] [address] since your last visit? Include any pap smears or colon screening.  No    Learning Assessment 7/7/2022   PRIMARY LEARNER Patient   PRIMARY LANGUAGE ENGLISH   LEARNER PREFERENCE PRIMARY LISTENING   ANSWERED BY self   RELATIONSHIP SELF

## 2022-07-08 ENCOUNTER — DOCUMENTATION ONLY (OUTPATIENT)
Dept: FAMILY MEDICINE CLINIC | Age: 87
End: 2022-07-08

## 2022-07-08 NOTE — PROGRESS NOTES
[address] order & home certification Lynette were put on Dr Melisa Robison desk to process

## 2022-07-12 ENCOUNTER — DOCUMENTATION ONLY (OUTPATIENT)
Dept: FAMILY MEDICINE CLINIC | Age: 87
End: 2022-07-12

## 2022-07-13 ENCOUNTER — DOCUMENTATION ONLY (OUTPATIENT)
Dept: FAMILY MEDICINE CLINIC | Age: 87
End: 2022-07-13

## 2022-07-13 ENCOUNTER — OFFICE VISIT (OUTPATIENT)
Dept: CARDIOLOGY CLINIC | Age: 87
End: 2022-07-13
Payer: MEDICARE

## 2022-07-13 VITALS
DIASTOLIC BLOOD PRESSURE: 60 MMHG | SYSTOLIC BLOOD PRESSURE: 138 MMHG | WEIGHT: 213 LBS | HEIGHT: 71 IN | HEART RATE: 70 BPM | BODY MASS INDEX: 29.82 KG/M2 | OXYGEN SATURATION: 96 %

## 2022-07-13 DIAGNOSIS — I25.118 CORONARY ARTERY DISEASE OF NATIVE ARTERY OF NATIVE HEART WITH STABLE ANGINA PECTORIS (HCC): Primary | ICD-10-CM

## 2022-07-13 DIAGNOSIS — I10 PRIMARY HYPERTENSION: ICD-10-CM

## 2022-07-13 DIAGNOSIS — E78.5 DYSLIPIDEMIA: ICD-10-CM

## 2022-07-13 PROCEDURE — 99214 OFFICE O/P EST MOD 30 MIN: CPT | Performed by: SPECIALIST

## 2022-07-13 PROCEDURE — G8536 NO DOC ELDER MAL SCRN: HCPCS | Performed by: SPECIALIST

## 2022-07-13 PROCEDURE — G8432 DEP SCR NOT DOC, RNG: HCPCS | Performed by: SPECIALIST

## 2022-07-13 PROCEDURE — G8427 DOCREV CUR MEDS BY ELIG CLIN: HCPCS | Performed by: SPECIALIST

## 2022-07-13 PROCEDURE — G8417 CALC BMI ABV UP PARAM F/U: HCPCS | Performed by: SPECIALIST

## 2022-07-13 PROCEDURE — 1123F ACP DISCUSS/DSCN MKR DOCD: CPT | Performed by: SPECIALIST

## 2022-07-13 PROCEDURE — 1101F PT FALLS ASSESS-DOCD LE1/YR: CPT | Performed by: SPECIALIST

## 2022-07-13 PROCEDURE — 1111F DSCHRG MED/CURRENT MED MERGE: CPT | Performed by: SPECIALIST

## 2022-07-13 RX ORDER — METOPROLOL SUCCINATE 50 MG/1
75 TABLET, EXTENDED RELEASE ORAL DAILY
Qty: 135 TABLET | Refills: 3 | Status: SHIPPED | OUTPATIENT
Start: 2022-07-13

## 2022-07-13 NOTE — PROGRESS NOTES
Order faxed to [facility]. Fax number [phone] confirmation number [id number].

## 2022-07-13 NOTE — PROGRESS NOTES
Chief Complaint   Patient presents with    Follow-up     6 months    Coronary Artery Disease    Hypertension    Cholesterol Problem     Vitals:    07/13/22 0923   BP: 138/60   BP 1 Location: Right upper arm   BP Patient Position: Sitting   BP Cuff Size: Large adult   Pulse: 70   Height: 5' 11\" (1.803 m)   Weight: 213 lb (96.6 kg)   SpO2: 96%     Chest pain denied   SOB denied   Palpitations denied   Swelling in hands/feet denied   Dizziness denied   Recent hospital stays [facility] 6/27-7/1 for CP; first time needed nitro; went away after nitro.   Refills denied     Did nuc in hospital? Showed some blockage, did [facility]

## 2022-07-13 NOTE — PROGRESS NOTES
Sonia Mosqueda MD. [facility]              Patient: Michelle Vanegas  : 1935      Today's Date: 2022          HISTORY OF PRESENT ILLNESS:     History of Present Illness:  Admitted 2 weeks ago with chest pain. Doing fine now - no further CP past couple of weeks. PAST MEDICAL HISTORY:     Past Medical History:   Diagnosis Date    CAD (coronary artery disease)     NSTEMI ---> Cath  - LAD 99% ---> ROQUE to LAD     Dyslipidemia     Factor 5 Leiden mutation, heterozygous ([facility] [address] [facility]     H/O carpal tunnel repair     HTN (hypertension)     Prostate CA ([facility] [address]     radiation treatments          Past Surgical History:   Procedure Laterality Date    HX CORONARY STENT PLACEMENT      HX OPEN REDUCTION INTERNAL FIXATION Left 2021    left patella    VT CARDIAC SURG PROCEDURE UNLIST             MEDICATIONS:     Current Outpatient Medications   Medication Sig Dispense Refill    metoprolol succinate (TOPROL-XL) 50 mg XL tablet Take 1 Tablet by mouth daily. 90 Tablet 3    mirabegron ER (MYRBETRIQ) 50 mg ER tablet Take 50 mg by mouth daily.  pantoprazole (PROTONIX) 40 mg tablet Take 1 Tablet by mouth daily. 90 Tablet 3    amLODIPine (NORVASC) 10 mg tablet Take 1 Tablet by mouth daily. 90 Tablet 3    metFORMIN ER (GLUCOPHAGE XR) 500 mg tablet Take 1 Tablet by mouth daily (with dinner). 90 Tablet 1    lisinopriL (PRINIVIL, ZESTRIL) 10 mg tablet Take 1 Tablet by mouth daily. 90 Tablet 3    tamsulosin (FLOMAX) 0.4 mg capsule Take 0.4 mg by mouth daily.  cpap machine kit by Does Not Apply route.  cinnamon bark (Cinnamon) 500 mg cap Take 1,000 mg by mouth two (2) times a day.  MOMETASONE FUROATE, BULK, by Does Not Apply route. Topical on scapl daily.  atorvastatin (LIPITOR) 20 mg tablet Take 1 Tablet by mouth daily. 90 Tablet 3    aspirin delayed-release 81 mg tablet Take 81 mg by mouth every other day.  cetirizine HCl (ALLER-ABRAN PO) Take 1 Tablet by mouth daily.  nitroglycerin (NITROSTAT) 0.4 mg SL tablet 0.4 mg by SubLINGual route every five (5) minutes as needed for Chest Pain. Up to 3 doses.  docosahexaenoic acid/epa (FISH OIL PO) Take 300 mg by mouth two (2) times a day.  magnesium oxide (MAG-OX) 400 mg tablet Take 400 mg by mouth daily. No Known Allergies          SOCIAL HISTORY:     Social History     Tobacco Use    Smoking status: Never Smoker    Smokeless tobacco: Never Used   Substance Use Topics    Alcohol use: Not Currently    Drug use: Never         FAMILY HISTORY:     Family History   Problem Relation Age of Onset    Cancer Mother     Heart Disease Father            REVIEW OF SYMPTOMS:     Review of Symptoms:  Constitutional: Negative for fever, chills  HEENT: Negative for nosebleeds, tinnitus, and vision changes. Respiratory: Negative for cough, wheezing  Cardiovascular: Negative for orthopnea, claudication, syncope, and PND. Gastrointestinal: Negative for abdominal pain, diarrhea, melena. Genitourinary: Negative for dysuria  Musculoskeletal: Negative for myalgias. Skin: Negative for rash  Heme: No problems bleeding. Neurological: Negative for speech change and focal weakness. PHYSICAL EXAM:     Physical Exam:  Visit Vitals  /60 (BP 1 Location: Right upper arm, BP Patient Position: Sitting, BP Cuff Size: Large adult)   Pulse 70   Ht 5' 11\" (1.803 m)   Wt 213 lb (96.6 kg)   SpO2 96%   BMI 29.71 kg/m²     Patient appears generally well, mood and affect are appropriate and pleasant. HEENT:  Hearing intact, non-icteric, normocephalic, atraumatic. Neck Exam: Supple, No JVD   Lung Exam: Clear to auscultation, even breath sounds. Cardiac Exam: Regular rate and rhythm with 2/6 systolic murmur   Abdomen: Soft, non-tender, normal bowel sounds. Obese   Extremities: Moves all ext well.  R> L mild LE edema   MSKTL: Overall good ROM ext  Skin: No significant rashes  Psych: Appropriate affect  Neuro - Grossly intact        LABS / OTHER STUDIES reviewed:       Lab Results   Component Value Date/Time    Sodium 139 07/01/2022 01:20 AM    Potassium 3.8 07/01/2022 01:20 AM    Chloride 104 07/01/2022 01:20 AM    CO2 27 07/01/2022 01:20 AM    Anion gap 8 07/01/2022 01:20 AM    Glucose 113 (H) 07/01/2022 01:20 AM    BUN 20 07/01/2022 01:20 AM    Creatinine 0.97 07/01/2022 01:20 AM    BUN/Creatinine ratio 21 (H) 07/01/2022 01:20 AM    GFR est AA >60 07/01/2022 01:20 AM    GFR est non-AA >60 07/01/2022 01:20 AM    Calcium 9.1 07/01/2022 01:20 AM    Bilirubin, total 0.7 03/02/2022 12:00 AM    Alk. phosphatase 91 03/02/2022 12:00 AM    Protein, total 6.8 03/02/2022 12:00 AM    Albumin 4.5 03/02/2022 12:00 AM    A-G Ratio 2.0 03/02/2022 12:00 AM    ALT (SGPT) 18 03/02/2022 12:00 AM    AST (SGOT) 22 03/02/2022 12:00 AM     Lab Results   Component Value Date/Time    Sodium 139 07/01/2022 01:20 AM    Potassium 3.8 07/01/2022 01:20 AM    Chloride 104 07/01/2022 01:20 AM    CO2 27 07/01/2022 01:20 AM    Anion gap 8 07/01/2022 01:20 AM    Glucose 113 (H) 07/01/2022 01:20 AM    BUN 20 07/01/2022 01:20 AM    Creatinine 0.97 07/01/2022 01:20 AM    BUN/Creatinine ratio 21 (H) 07/01/2022 01:20 AM    GFR est AA >60 07/01/2022 01:20 AM    GFR est non-AA >60 07/01/2022 01:20 AM    Calcium 9.1 07/01/2022 01:20 AM    Bilirubin, total 0.7 03/02/2022 12:00 AM    Alk.  phosphatase 91 03/02/2022 12:00 AM    Protein, total 6.8 03/02/2022 12:00 AM    Albumin 4.5 03/02/2022 12:00 AM    A-G Ratio 2.0 03/02/2022 12:00 AM    ALT (SGPT) 18 03/02/2022 12:00 AM    AST (SGOT) 22 03/02/2022 12:00 AM     Lab Results   Component Value Date/Time    WBC 5.9 07/01/2022 01:20 AM    HGB 13.8 07/01/2022 01:20 AM    HCT 42.1 07/01/2022 01:20 AM    PLATELET 361 92/35/3566 01:20 AM    MCV 84.7 07/01/2022 01:20 AM       Lab Results   Component Value Date/Time    Cholesterol, total 128 03/02/2022 12:00 AM    HDL Cholesterol 48 03/02/2022 12:00 AM    LDL, calculated 57 03/02/2022 12:00 AM    VLDL, calculated 23 03/02/2022 12:00 AM    Triglyceride 134 03/02/2022 12:00 AM     Lab Results   Component Value Date/Time    TSH 0.653 03/02/2022 12:00 AM             CARDIAC DIAGNOSTICS:     Cardiac Evaluation Includes:  I reviewed the results below. EKG 6/28/21 - NSR, multifocal PVC's, RBBB, LAFB    NSTEMI ---> Cath 6/13 - LAD 99% ---> ROQUE to LAD   Carotids 1/19 - < 50% ICA stenosis bilat   Echo 1/19 - LVEF 60-65%, mild AS   Exercise Cardiolite 2/19 - 7 METS, no ischemia, nml EF      Echo 1/12/22 - LVEF 55-60%, AV sclerosis and mild AS, Asc Ao 3.9 cm     Venous Doppler 6/28/22 -   Bilateral lower extremity venous duplex negative for deep venous thrombosis or thrombophlebitis in the visualized vein segments.    Incidental findings:   (1) There is a fluid filled area seen in the right popliteal fossa that could be indicative of a Baker's cyst. Clinical correlation is recommended. (2) Prominent right groin lymph nodes. Echo 6/28/22 - LVEF 60%    Lexiscan Cardiolite 6/29/22 - LV perfusion is abnormal. There is evidence of inducible ischemia. There is a moderate severity left ventricular stress perfusion defect that is small to medium in size present in the mid to distal inferior and inferoapical segment(s) that is partially reversible. SDS 5, SSS 6, SRS 1. Cardiac Cath 7/1//22 -   LAD: Prox- stent patent;Mid 50%; Distal vessel - small; 60%; D1/D2/D3 - small to med; MLI  LCflex: Med; MLI; OM1 - large; bifurcates into 2 vessels MLI   RCA: Large; Dominant; Mid 50%; PDA and PLB - small to med; MLI  LVEDP: 5 mm Hg  LVEF: Nml  No significant gradient across aortic valve.   RCA - Mid 50 % ; iFR 0.94    PCI: none          ASSESSMENT AND PLAN:     Assessment and Plan:    1) CAD   - He had PCI to LAD in 2013 after NSTEMI   - He was admitted with CP 6/28/22 - cath with moderate non-obstructive disease  - He is doing well since discharge without further CP   - continue cardiac meds as above (asa, statin, BB, ACE-I, CCB)   --> will increase to Toprol XL 75 mg daily     2) HTN  - BP OK overall   - cont meds which he is tolerating and follow at home occasionally     3) Dyslipidemia  - cont statin   - prior lipids OK     4) Very Mild aortic stenosis  - follow     5) Right LE edema   - No DVT on 6/28/22 doppler - but Baker's cyst noted   - does not bother him much - follow     6) See me back in 6 months    Wife passed in May 2021 (ESRD). Retired builder. Moved back to [address] [place] [address] in 2021 to be near family. Jenelle Martínez MD, [place] [id number] [facility] [address].  [address], [facility] [address]. Joi Tabor.  [place], [address] [place] [address]  Ph: [phone]   Ph [phone]

## 2022-07-15 ENCOUNTER — PATIENT OUTREACH (OUTPATIENT)
Dept: CASE MANAGEMENT | Age: 87
End: 2022-07-15

## 2022-07-15 NOTE — PROGRESS NOTES
Care Transitions Follow Up Call    Challenges to be reviewed by the provider   Additional needs identified to be addressed with provider: no         Method of communication with provider : none    Care Transition Nurse (CTN) contacted the patient by telephone to follow up after admission on 2022. Verified name and  with patient as identifiers. Addressed changes since last contact: none  Follow up appointment completed? yes. Was follow up appointment scheduled within 7 days of discharge? yes. Advance Care Planning:   Does patient have an Advance Directive:  ACP referral placed    CTN reviewed discharge instructions, medical action plan and red flags with patient and discussed any barriers to care and/or understanding of plan of care after discharge. Discussed appropriate site of care based on symptoms and resources available to patient including: When to call 911. The patient agrees to contact the PCP office for questions related to their healthcare. Patients top risk factors for readmission: none noted   Interventions to address risk factors: Obtained and reviewed discharge summary and/or continuity of care documents    [facility] follow up appointment(s):   Future Appointments   Date Time Provider Donavan Adan   10/12/2022  8:30 AM Afsaneh Finn MD [facility] BS AMB   2023 10:20 AM Johanna Neville MD [facility] BS AMB     Non-[facility] follow up appointment(s): none    CTN provided contact information for future needs. Plan for follow-up call in 5-7 days based on severity of symptoms and risk factors. Plan for next call: ACP follow up      Goals Addressed                 This Visit's Progress     ACP   On track     7/15/2022  -Patient does not currently have a ACP. He would like to speak to ACP team. CTN to send referral.  -CTN to follow up in 1 week  SP  2022  -No ACP on file. -CTN to discuss next call  SP       Attends follow-up appointments as directed.    On track     7/15/2022  -Patient attend all follow up appointments as directed  7/6/2022  -Will attend Dermatology follow up on 7/6  -Will attend PCP follow up on 7/7  -Will attend Cardiology follow up on 7/13  -CTN to follow up in 1 week  SP

## 2022-07-20 RX ORDER — ATORVASTATIN CALCIUM 20 MG/1
20 TABLET, FILM COATED ORAL DAILY
Qty: 90 TABLET | Refills: 3 | Status: SHIPPED | OUTPATIENT
Start: 2022-07-20

## 2022-07-22 ENCOUNTER — PATIENT OUTREACH (OUTPATIENT)
Dept: CASE MANAGEMENT | Age: 87
End: 2022-07-22

## 2022-07-23 DIAGNOSIS — I25.118 CORONARY ARTERY DISEASE OF NATIVE ARTERY OF NATIVE HEART WITH STABLE ANGINA PECTORIS (HCC): ICD-10-CM

## 2022-07-23 DIAGNOSIS — I10 PRIMARY HYPERTENSION: ICD-10-CM

## 2022-07-23 RX ORDER — METOPROLOL SUCCINATE 50 MG/1
TABLET, EXTENDED RELEASE ORAL
Qty: 30 TABLET | OUTPATIENT
Start: 2022-07-23

## 2022-07-25 ENCOUNTER — PATIENT OUTREACH (OUTPATIENT)
Dept: CASE MANAGEMENT | Age: 87
End: 2022-07-25

## 2022-07-25 NOTE — ACP (ADVANCE CARE PLANNING)
Date of Conversation:    [x]Yes []No  Patient has prior advance directive? []Yes [x]No  Agent Present (in person or by phone)    Meeting Outcomes:   [x] ACP discussion completed   [x] Advance directive form completed   [x] All elements of Docu-sign completed   [x] Copy sent to Central Scanning to be scanned into electronic medical record   [x] Updated Decision Maker info in Epic's ACP Navigator    [x] Recommended to review AMD annually or upon change in health status      Primary Agent's Name: Linda Brown  Relationship to Patient: son      Secondary Agent's Name:Janina Montano  Relationship to Patient: daughter    Agent confirms Willingness to:  [x]Yes []No   Accept role  [x]Yes []No   Talk with individual about his/her goals, values, & preferences  [x]Yes []No   Follow pt's decisions, even if disagrees with these decisions  [x]Yes  []No   Make decisions in difficult moments        What would be an unacceptable quality of life for you?  \"Not being able to take care of myself (feeding, dressing and bathing)\". \"What else would want your loved ones or medical team to know about how you would want to be cared for? \" \"I have a very good connection with my children\". CPR Intro for Patients Without Chronic Illness:  [] N/A  [x] Yes  [] No   Educated patient regarding differences between AMD and DDNR  [x] Yes  [] No   Provided CPR Fact Sheet for additional information       \"What would be an unacceptable outcome of CPR to you? \" Patient response: \"I would be okay with CPR one time\".     [x] Yes  [] No   Patient requests attempts at CPR if heart/breathing stops    [x] Yes  [] No   Educated patient regarding differences between AMD and DDNR  [] Yes  [] No  [x] N/A   If patient requested DDNR order, referred to provider for follow-up    [x] This note routed to one or more involved healthcare providers

## 2022-08-02 ENCOUNTER — DOCUMENTATION ONLY (OUTPATIENT)
Dept: FAMILY MEDICINE CLINIC | Age: 87
End: 2022-08-02

## 2022-08-05 ENCOUNTER — PATIENT OUTREACH (OUTPATIENT)
Dept: CASE MANAGEMENT | Age: 87
End: 2022-08-05

## 2022-08-05 NOTE — PROGRESS NOTES
Patient has graduated from the Transitions of Care Coordination  program on 8/5/2022. Patient/family has the ability to self-manage at this time Care management goals have been completed. Patient was not referred to the [facility] team for further management. Goals Addressed                   This Visit's Progress     COMPLETED: ACP        8/5/2022  -Completed with Bettye Liu  7/15/2022  -Patient does not currently have a ACP. He would like to speak to ACP team. CTN to send referral.  -CTN to follow up in 1 week  SP  7/6/2022  -No ACP on file. -CTN to discuss next call  SP       COMPLETED: Attends follow-up appointments as directed. 7/15/2022  -Patient attend all follow up appointments as directed  7/6/2022  -Will attend Dermatology follow up on 7/6  -Will attend PCP follow up on 7/7  -Will attend Cardiology follow up on 7/13  -CTN to follow up in 1 week  SP              Patient has Care Transition Nurse's contact information for any further questions, concerns, or needs.   Patients upcoming visits:    Future Appointments   Date Time Provider Donavan Adan   10/12/2022  8:30 AM Susanne Michele MD [facility] BS AMB   1/17/2023 10:20 AM MD RACIEL Garnett BS AMB

## 2022-09-07 RX ORDER — METFORMIN HYDROCHLORIDE 500 MG/1
TABLET, EXTENDED RELEASE ORAL
Qty: 90 TABLET | Refills: 1 | Status: SHIPPED | OUTPATIENT
Start: 2022-09-07

## 2022-10-11 ENCOUNTER — DOCUMENTATION ONLY (OUTPATIENT)
Dept: FAMILY MEDICINE CLINIC | Age: 87
End: 2022-10-11

## 2022-10-17 ENCOUNTER — DOCUMENTATION ONLY (OUTPATIENT)
Dept: FAMILY MEDICINE CLINIC | Age: 87
End: 2022-10-17

## 2022-10-17 NOTE — PROGRESS NOTES
Pre-op Medication management Place faxed to [facility]. Fax number [phone]  confirmation number [id number].

## 2022-10-28 ENCOUNTER — OFFICE VISIT (OUTPATIENT)
Dept: FAMILY MEDICINE CLINIC | Age: 87
End: 2022-10-28
Payer: MEDICARE

## 2022-10-28 VITALS
DIASTOLIC BLOOD PRESSURE: 76 MMHG | SYSTOLIC BLOOD PRESSURE: 125 MMHG | HEIGHT: 71 IN | TEMPERATURE: 97.6 F | WEIGHT: 217.5 LBS | HEART RATE: 66 BPM | BODY MASS INDEX: 30.45 KG/M2 | OXYGEN SATURATION: 98 %

## 2022-10-28 DIAGNOSIS — I25.118 CORONARY ARTERY DISEASE OF NATIVE ARTERY OF NATIVE HEART WITH STABLE ANGINA PECTORIS (HCC): ICD-10-CM

## 2022-10-28 DIAGNOSIS — E11.9 DIABETES MELLITUS TYPE 2, DIET-CONTROLLED (HCC): Primary | ICD-10-CM

## 2022-10-28 DIAGNOSIS — I10 PRIMARY HYPERTENSION: ICD-10-CM

## 2022-10-28 DIAGNOSIS — E78.5 DYSLIPIDEMIA: ICD-10-CM

## 2022-10-28 PROCEDURE — 99214 OFFICE O/P EST MOD 30 MIN: CPT | Performed by: STUDENT IN AN ORGANIZED HEALTH CARE EDUCATION/TRAINING PROGRAM

## 2022-10-28 PROCEDURE — 1123F ACP DISCUSS/DSCN MKR DOCD: CPT | Performed by: STUDENT IN AN ORGANIZED HEALTH CARE EDUCATION/TRAINING PROGRAM

## 2022-10-28 PROCEDURE — G8536 NO DOC ELDER MAL SCRN: HCPCS | Performed by: STUDENT IN AN ORGANIZED HEALTH CARE EDUCATION/TRAINING PROGRAM

## 2022-10-28 PROCEDURE — G8510 SCR DEP NEG, NO PLAN REQD: HCPCS | Performed by: STUDENT IN AN ORGANIZED HEALTH CARE EDUCATION/TRAINING PROGRAM

## 2022-10-28 PROCEDURE — G8427 DOCREV CUR MEDS BY ELIG CLIN: HCPCS | Performed by: STUDENT IN AN ORGANIZED HEALTH CARE EDUCATION/TRAINING PROGRAM

## 2022-10-28 PROCEDURE — 1101F PT FALLS ASSESS-DOCD LE1/YR: CPT | Performed by: STUDENT IN AN ORGANIZED HEALTH CARE EDUCATION/TRAINING PROGRAM

## 2022-10-28 PROCEDURE — 3044F HG A1C LEVEL LT 7.0%: CPT | Performed by: STUDENT IN AN ORGANIZED HEALTH CARE EDUCATION/TRAINING PROGRAM

## 2022-10-28 PROCEDURE — G8417 CALC BMI ABV UP PARAM F/U: HCPCS | Performed by: STUDENT IN AN ORGANIZED HEALTH CARE EDUCATION/TRAINING PROGRAM

## 2022-10-28 NOTE — PATIENT INSTRUCTIONS
Make sure all your specialists are forwarding updates to Dr. Praveen Velásquez  Fax: [phone]    Send [employer] message and/or call if you need anything.

## 2022-10-28 NOTE — PROGRESS NOTES
Progress Note    he is a 81y.o. year old male who presents for evalution. Assessment/ Plan:   Diagnoses and all orders for this visit:    1. Diabetes mellitus type 2, diet-controlled ([facility] [address]  Assessment & Plan:  Lab Results   Component Value Date/Time    Hemoglobin A1c 6.6 (H) 03/02/2022 12:00 AM    Hemoglobin A1c (POC) 6.0 07/07/2022 02:03 PM   Labs from June and July 2022 reviewed. Patient will provide labs from September 2022 done at the [place]  Diabetes well controlled with diet. 2. Coronary artery disease of native artery of native heart with stable angina pectoris ([facility] [address]    3. Dyslipidemia    4. Primary hypertension       Follow-up and Dispositions    Return in about 4 months (around 2/28/2023) for follow-up blood pressure 3-6 months. I have discussed the diagnosis with the patient and the intended plan as seen in the above orders. The patient has received an after-visit summary and questions were answered concerning future plans. Pt conveyed understanding of plan. Medication Side Effects and Warnings were discussed with patient        Subjective:     Chief Complaint   Patient presents with    Diabetes     Granddaughter getting  on he and his wife's anniversary. Ya Higuera will officiate  Daughter who lives in [place] area is going through a divorce. Building another house here, [place]. 2 master suites for the first floor    Upcoming surgery   Having a lot of bladder problems   Was scoped and had a urolift, now it has closed up again and he is having persistent urinary complaints    Derm procedure  Planning to put medication to clear precancerous lesions on the skin of face. (Likely fluorouracil)    No recurrence of chest pain  No dizziness, shortness of breath  Continues to be active, doing a lot of projects  Continues to follow with Dr. Wendy Jha   Decreased metoprolol, now taking 75 mg     Recently with bloodwork at the [place]   A1c 6.3%  Seen there for tinnitus.   Given hearing aids. Not currently using. They insisted he have a primary care there. Labs done 1 month ago. He has the report and will provide a copy      Reviewed PmHx, RxHx, FmHx, SocHx, AllgHx and updated and dated in the chart. Review of Systems - negative except as listed above in the HPI    Objective:     Vitals:    10/28/22 0836   BP: 125/76   Pulse: 66   Temp: 97.6 °F (36.4 °C)   SpO2: 98%   Weight: 217 lb 8 oz (98.7 kg)   Height: 5' 11\" (1.803 m)       Current Outpatient Medications   Medication Sig    metFORMIN ER (GLUCOPHAGE XR) 500 mg tablet TAKE 1 TABLET EVERY DAY WITH DINNER    atorvastatin (LIPITOR) 20 mg tablet Take 1 Tablet by mouth in the morning. metoprolol succinate (TOPROL-XL) 50 mg XL tablet Take 1.5 Tablets by mouth daily. pantoprazole (PROTONIX) 40 mg tablet Take 1 Tablet by mouth daily. amLODIPine (NORVASC) 10 mg tablet Take 1 Tablet by mouth daily. lisinopriL (PRINIVIL, ZESTRIL) 10 mg tablet Take 1 Tablet by mouth daily. tamsulosin (FLOMAX) 0.4 mg capsule Take 0.4 mg by mouth daily. cpap machine kit by Does Not Apply route. cinnamon bark 500 mg cap Take 1,000 mg by mouth two (2) times a day. MOMETASONE FUROATE, BULK, by Does Not Apply route. Topical on scapl daily. aspirin delayed-release 81 mg tablet Take 81 mg by mouth every other day. cetirizine HCl (ALLER-ABRAN PO) Take 1 Tablet by mouth daily. nitroglycerin (NITROSTAT) 0.4 mg SL tablet 0.4 mg by SubLINGual route every five (5) minutes as needed for Chest Pain. Up to 3 doses. docosahexaenoic acid/epa (FISH OIL PO) Take 300 mg by mouth two (2) times a day. magnesium oxide (MAG-OX) 400 mg tablet Take 400 mg by mouth daily. mirabegron ER (MYRBETRIQ) 50 mg ER tablet Take 50 mg by mouth daily. (Patient not taking: Reported on 10/28/2022)     No current facility-administered medications for this visit. Physical Exam  Vitals and nursing note reviewed.    Constitutional:       General: He is not in acute distress. Appearance: Normal appearance. He is not ill-appearing, toxic-appearing or diaphoretic. HENT:      Head: Normocephalic and atraumatic. Eyes:      General: No scleral icterus. Right eye: No discharge. Left eye: No discharge. Conjunctiva/sclera: Conjunctivae normal.   Cardiovascular:      Rate and Rhythm: Normal rate and regular rhythm. Pulses: Normal pulses. Heart sounds: Murmur heard. Pulmonary:      Effort: Pulmonary effort is normal. No respiratory distress. Breath sounds: Normal breath sounds. Musculoskeletal:      Cervical back: No rigidity. Right lower leg: No edema. Left lower leg: No edema. Skin:     General: Skin is warm and dry. Neurological:      General: No focal deficit present. Mental Status: He is alert. Psychiatric:         Mood and Affect: Mood normal.         Behavior: Behavior normal.         Thought Content:  Thought content normal.         Judgment: Judgment normal.            Alem Pathak MD

## 2022-10-28 NOTE — PROGRESS NOTES
Pavan Fairbanks is a 81 y.o. male , id x 2(name and ). Reviewed record, history, and  medications. Chief Complaint   Patient presents with    Diabetes       Vitals:    10/28/22 0836   BP: 125/76   Pulse: 66   Temp: 97.6 °F (36.4 °C)   SpO2: 98%   Weight: 217 lb 8 oz (98.7 kg)   Height: 5' 11\" (1.803 m)       Coordination of Care Questionnaire:   1. Have you been to the ER, urgent care clinic since your last visit? Hospitalized since your last visit? No    2. Have you seen or consulted any other health care providers outside of the [address] [place] [address] since your last visit? Include any pap smears or colon screening. No      3 most recent PHQ Screens 10/28/2022   Little interest or pleasure in doing things Not at all   Feeling down, depressed, irritable, or hopeless Not at all   Total Score PHQ 2 0       Patient is accompanied by self I have received verbal consent from Pavan Fairbanks to discuss any/all medical information while they are present in the room.

## 2022-11-13 PROBLEM — S09.93XA BLUNT TRAUMA OF FACE: Status: RESOLVED | Noted: 2022-03-14 | Resolved: 2022-11-13

## 2022-11-13 NOTE — ASSESSMENT & PLAN NOTE
Lab Results   Component Value Date/Time    Hemoglobin A1c 6.6 (H) 03/02/2022 12:00 AM    Hemoglobin A1c (POC) 6.0 07/07/2022 02:03 PM   Labs from June and July 2022 reviewed. Patient will provide labs from September 2022 done at the [place]  Diabetes well controlled with diet.

## 2022-12-28 DIAGNOSIS — I10 PRIMARY HYPERTENSION: ICD-10-CM

## 2022-12-30 RX ORDER — LISINOPRIL 10 MG/1
TABLET ORAL
Qty: 90 TABLET | Refills: 3 | Status: SHIPPED | OUTPATIENT
Start: 2022-12-30

## 2023-01-31 ENCOUNTER — TELEPHONE (OUTPATIENT)
Dept: FAMILY MEDICINE CLINIC | Age: 88
End: 2023-01-31

## 2023-01-31 ENCOUNTER — OFFICE VISIT (OUTPATIENT)
Dept: CARDIOLOGY CLINIC | Age: 88
End: 2023-01-31
Payer: MEDICARE

## 2023-01-31 VITALS
HEIGHT: 71 IN | DIASTOLIC BLOOD PRESSURE: 88 MMHG | SYSTOLIC BLOOD PRESSURE: 128 MMHG | HEART RATE: 74 BPM | BODY MASS INDEX: 31.08 KG/M2 | OXYGEN SATURATION: 98 % | WEIGHT: 222 LBS

## 2023-01-31 DIAGNOSIS — I25.118 CORONARY ARTERY DISEASE OF NATIVE ARTERY OF NATIVE HEART WITH STABLE ANGINA PECTORIS (HCC): ICD-10-CM

## 2023-01-31 DIAGNOSIS — I10 PRIMARY HYPERTENSION: ICD-10-CM

## 2023-01-31 DIAGNOSIS — E78.5 DYSLIPIDEMIA: Primary | ICD-10-CM

## 2023-01-31 PROCEDURE — G9717 DOC PT DX DEP/BP F/U NT REQ: HCPCS | Performed by: SPECIALIST

## 2023-01-31 PROCEDURE — G8536 NO DOC ELDER MAL SCRN: HCPCS | Performed by: SPECIALIST

## 2023-01-31 PROCEDURE — 1123F ACP DISCUSS/DSCN MKR DOCD: CPT | Performed by: SPECIALIST

## 2023-01-31 PROCEDURE — 99214 OFFICE O/P EST MOD 30 MIN: CPT | Performed by: SPECIALIST

## 2023-01-31 PROCEDURE — G8417 CALC BMI ABV UP PARAM F/U: HCPCS | Performed by: SPECIALIST

## 2023-01-31 PROCEDURE — G8427 DOCREV CUR MEDS BY ELIG CLIN: HCPCS | Performed by: SPECIALIST

## 2023-01-31 PROCEDURE — 1101F PT FALLS ASSESS-DOCD LE1/YR: CPT | Performed by: SPECIALIST

## 2023-01-31 RX ORDER — METOPROLOL SUCCINATE 50 MG/1
75 TABLET, EXTENDED RELEASE ORAL DAILY
Qty: 135 TABLET | Refills: 3 | Status: CANCELLED | OUTPATIENT
Start: 2023-01-31

## 2023-01-31 RX ORDER — METOPROLOL SUCCINATE 100 MG/1
100 TABLET, EXTENDED RELEASE ORAL DAILY
Qty: 90 TABLET | Refills: 3 | Status: SHIPPED | OUTPATIENT
Start: 2023-01-31
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
## 2023-01-31 NOTE — TELEPHONE ENCOUNTER
We did not draw his labs at our visit in October since he had just had labs done at the [facility] in September. If he is able to bring his copy of the [facility] labs by the office so I can have a copy, I will review and have them scanned into his chart.

## 2023-01-31 NOTE — TELEPHONE ENCOUNTER
PT came into the office wondering while his labs from October are not in his chart. He stated that he was seen in the office in October that his labs were drawn and when he saw the cardiologist this morning (1/31) he was asking PT about his labs results and he was also not able to see results in PT's chart.     PT would like to be called and would like to know where the results of his labs are    # [phone]

## 2023-01-31 NOTE — PROGRESS NOTES
Chief Complaint   Patient presents with    Follow-up     6 mo     Coronary Artery Disease    Hypertension    Cholesterol Problem     Vitals:    01/31/23 1016   BP: 128/88   BP 1 Location: Left upper arm   BP Patient Position: Sitting   Pulse: 74   Height: 5' 11\" (1.803 m)   Weight: 222 lb (100.7 kg)   SpO2: 98%       Chest pain denied     SOB denied     Palpitations denied     Swelling in hands/feet denied     Dizziness denied     Recent hospital stays denied     Refills denied

## 2023-02-02 NOTE — TELEPHONE ENCOUNTER
PT id x 3, notified as per Dr. Cherie Mejia and verbalized understanding. Pt states that he vividly remembers having his labs drawn that day and giving a urine sample; as he was taken back to the lab while he had stopped to make his follow up appt. Pt stated that he is not sure where his copy of his  labs from the [facility] are but he will attempt to locate them. Checked with  Zain Cutler who checked her logs and states that it looks like she did draw him but was then told to cancel his labs; so she discarded the samples.

## 2023-02-27 DIAGNOSIS — K21.9 GASTROESOPHAGEAL REFLUX DISEASE, UNSPECIFIED WHETHER ESOPHAGITIS PRESENT: ICD-10-CM

## 2023-02-27 DIAGNOSIS — I10 ESSENTIAL HYPERTENSION: ICD-10-CM

## 2023-02-27 RX ORDER — METFORMIN HYDROCHLORIDE 500 MG/1
TABLET, EXTENDED RELEASE ORAL
Qty: 90 TABLET | Refills: 1 | Status: SHIPPED | OUTPATIENT
Start: 2023-02-27

## 2023-02-27 RX ORDER — PANTOPRAZOLE SODIUM 40 MG/1
TABLET, DELAYED RELEASE ORAL
Qty: 90 TABLET | Refills: 3 | Status: SHIPPED | OUTPATIENT
Start: 2023-02-27

## 2023-02-27 RX ORDER — AMLODIPINE BESYLATE 10 MG/1
TABLET ORAL
Qty: 90 TABLET | Refills: 3 | Status: SHIPPED | OUTPATIENT
Start: 2023-02-27

## 2023-03-03 ENCOUNTER — OFFICE VISIT (OUTPATIENT)
Dept: FAMILY MEDICINE CLINIC | Age: 88
End: 2023-03-03
Payer: MEDICARE

## 2023-03-03 VITALS
DIASTOLIC BLOOD PRESSURE: 77 MMHG | HEART RATE: 67 BPM | BODY MASS INDEX: 31.53 KG/M2 | OXYGEN SATURATION: 98 % | HEIGHT: 71 IN | SYSTOLIC BLOOD PRESSURE: 133 MMHG | WEIGHT: 225.2 LBS | TEMPERATURE: 98 F

## 2023-03-03 DIAGNOSIS — E11.9 DIABETES MELLITUS TYPE 2, DIET-CONTROLLED (HCC): ICD-10-CM

## 2023-03-03 DIAGNOSIS — I10 PRIMARY HYPERTENSION: Primary | ICD-10-CM

## 2023-03-03 PROCEDURE — G8417 CALC BMI ABV UP PARAM F/U: HCPCS | Performed by: STUDENT IN AN ORGANIZED HEALTH CARE EDUCATION/TRAINING PROGRAM

## 2023-03-03 PROCEDURE — 1123F ACP DISCUSS/DSCN MKR DOCD: CPT | Performed by: STUDENT IN AN ORGANIZED HEALTH CARE EDUCATION/TRAINING PROGRAM

## 2023-03-03 PROCEDURE — 3044F HG A1C LEVEL LT 7.0%: CPT | Performed by: STUDENT IN AN ORGANIZED HEALTH CARE EDUCATION/TRAINING PROGRAM

## 2023-03-03 PROCEDURE — 1101F PT FALLS ASSESS-DOCD LE1/YR: CPT | Performed by: STUDENT IN AN ORGANIZED HEALTH CARE EDUCATION/TRAINING PROGRAM

## 2023-03-03 PROCEDURE — 99214 OFFICE O/P EST MOD 30 MIN: CPT | Performed by: STUDENT IN AN ORGANIZED HEALTH CARE EDUCATION/TRAINING PROGRAM

## 2023-03-03 PROCEDURE — G8427 DOCREV CUR MEDS BY ELIG CLIN: HCPCS | Performed by: STUDENT IN AN ORGANIZED HEALTH CARE EDUCATION/TRAINING PROGRAM

## 2023-03-03 PROCEDURE — G9717 DOC PT DX DEP/BP F/U NT REQ: HCPCS | Performed by: STUDENT IN AN ORGANIZED HEALTH CARE EDUCATION/TRAINING PROGRAM

## 2023-03-03 PROCEDURE — G8536 NO DOC ELDER MAL SCRN: HCPCS | Performed by: STUDENT IN AN ORGANIZED HEALTH CARE EDUCATION/TRAINING PROGRAM

## 2023-03-03 RX ORDER — LISINOPRIL 20 MG/1
20 TABLET ORAL DAILY
Qty: 90 TABLET | Refills: 1 | Status: SHIPPED | OUTPATIENT
Start: 2023-03-03

## 2023-03-03 NOTE — LETTER
3/16/2023          Mr. Lima [address]ata[address] [id number]      Dear Lima Yarbrough:    Please find your most recent results below. Attached are the results of your most recent lab work. I have the following recommendations:      1.  Your CBC which looks at your white blood cells, red blood cells, and platelets came back looking normal. No sign of infection or anemia.       2.  Your TSH which screens for thyroid disease came back normal. This means you likely do not have hyper or hypothyroidism.       3.  Your metabolic panel which looks at your electrolytes, liver function, and kidney function looks good.       4.  A1c shows that diabetes is well controlled.  Continue current medications and focus on healthy diet and exercise.      5.  Your cholesterol is well controlled.  Continue your current Lipitor.     6. Emmett Lather is an elevation of protein (microalbumin) in your urine.  This is an early marker for kidney damage, likely related to diabetes and high blood pressure.  Focus on keeping both of these under control, avoid regular NSAID use (ibuprofen, Motrin, Advil, naprosyn, Aleve), and avoid smoking to prevent further kidney damage.     Some values may be minimally outside the \"normal\" range but are not harmful or clinically significant. Please let me know if you have any questions or concerns regarding these results. I recommend we repeat fasting labs in 3 to 6 months.     Please call me if you have any questions: [phone]    Sincerely,      Hunter Li MD    Resulted Orders   MICROALBUMIN, UR, RAND W/ MICROALB/CREAT RATIO   Result Value Ref Range    Microalbumin,urine random 31.00 MG/DL    Creatinine, urine random 74.60 mg/dL    Microalbumin/Creat ratio (mg/g creat) 416 (H) 0 - 30 mg/g   HEMOGLOBIN A1C WITH EAG   Result Value Ref Range    Hemoglobin A1c 6.5 (H) 4.0 - 5.6 %    Est. average glucose 098 mg/dL   METABOLIC PANEL, COMPREHENSIVE   Result Value Ref Range    Sodium 139 136 - 145 mmol/L    Potassium 4.1 3.5 - 5.1 mmol/L    Chloride 103 97 - 108 mmol/L    CO2 28 21 - 32 mmol/L    Anion gap 8 5 - 15 mmol/L    Glucose 178 (H) 65 - 100 mg/dL    BUN 16 6 - 20 MG/DL    Creatinine 0.93 0.70 - 1.30 MG/DL    BUN/Creatinine ratio 17 12 - 20      eGFR >60 >60 ml/min/1.73m2    Calcium 9.2 8.5 - 10.1 MG/DL    Bilirubin, total 0.9 0.2 - 1.0 MG/DL    ALT (SGPT) 34 12 - 78 U/L    AST (SGOT) 23 15 - 37 U/L    Alk.  phosphatase 102 45 - 117 U/L    Protein, total 7.1 6.4 - 8.2 g/dL    Albumin 4.0 3.5 - 5.0 g/dL    Globulin 3.1 2.0 - 4.0 g/dL    A-G Ratio 1.3 1.1 - 2.2

## 2023-03-03 NOTE — PROGRESS NOTES
Progress Note    he is a 81y.o. year old male who presents for evalution. Assessment/ Plan:   {There are no diagnoses linked to this encounter. (Refresh or delete this SmartLink)}     Patient is *** fasting for labs today. I have discussed the diagnosis with the patient and the intended plan as seen in the above orders. The patient has received an after-visit summary and questions were answered concerning future plans. Pt conveyed understanding of plan. Medication Side Effects and Warnings were discussed with patient        Subjective:     Chief Complaint   Patient presents with    Blood Pressure Check     4 month f/up     Documentation     Would like a  new handicap placard      Follow-up with Dr. Lizzie López 1/31  He increased the metoprolol back to 100 mg daily  Home bp monitoring typically 158F systolic      Reviewed PmHx, RxHx, FmHx, SocHx, AllgHx and updated and dated in the chart. Review of Systems - negative except as listed above in the HPI    Objective:     Vitals:    03/03/23 0904   BP: 133/77   Pulse: 67   Temp: 98 °F (36.7 °C)   SpO2: 98%   Weight: 225 lb 3.2 oz (102.2 kg)   Height: 5' 11\" (1.803 m)       Current Outpatient Medications   Medication Sig    metFORMIN ER (GLUCOPHAGE XR) 500 mg tablet TAKE 1 TABLET EVERY DAY WITH DINNER    amLODIPine (NORVASC) 10 mg tablet TAKE 1 TABLET EVERY DAY    pantoprazole (PROTONIX) 40 mg tablet TAKE 1 TABLET EVERY DAY    metoprolol succinate (TOPROL-XL) 100 mg tablet Take 1 Tablet by mouth daily. lisinopriL (PRINIVIL, ZESTRIL) 10 mg tablet TAKE 1 TABLET EVERY DAY    atorvastatin (LIPITOR) 20 mg tablet Take 1 Tablet by mouth in the morning. tamsulosin (FLOMAX) 0.4 mg capsule Take 0.4 mg by mouth daily. cpap machine kit by Does Not Apply route. cinnamon bark 500 mg cap Take 1,000 mg by mouth two (2) times a day. MOMETASONE FUROATE, BULK, by Does Not Apply route. Topical on scapl daily.     aspirin delayed-release 81 mg tablet Take 81 mg by mouth every other day. cetirizine HCl (ALLER-ABRAN PO) Take 1 Tablet by mouth daily. nitroglycerin (NITROSTAT) 0.4 mg SL tablet 0.4 mg by SubLINGual route every five (5) minutes as needed for Chest Pain. Up to 3 doses. docosahexaenoic acid/epa (FISH OIL PO) Take 300 mg by mouth two (2) times a day. magnesium oxide (MAG-OX) 400 mg tablet Take 400 mg by mouth daily. mirabegron ER (MYRBETRIQ) 50 mg ER tablet Take 50 mg by mouth daily. (Patient not taking: No sig reported)     No current facility-administered medications for this visit. Physical Exam  Vitals and nursing note reviewed. Constitutional:       General: He is not in acute distress. Appearance: Normal appearance. He is not ill-appearing, toxic-appearing or diaphoretic. HENT:      Head: Normocephalic and atraumatic. Eyes:      General: No scleral icterus. Right eye: No discharge. Left eye: No discharge. Conjunctiva/sclera: Conjunctivae normal.   Cardiovascular:      Rate and Rhythm: Normal rate and regular rhythm. Pulses: Normal pulses. Heart sounds: Murmur heard. Pulmonary:      Effort: Pulmonary effort is normal. No respiratory distress. Breath sounds: Normal breath sounds. Musculoskeletal:      Cervical back: No rigidity. Right lower leg: Edema (1+) present. Left lower leg: Edema (1+) present. Skin:     General: Skin is warm and dry. Neurological:      General: No focal deficit present. Mental Status: He is alert.             Reji Brooke MD present. Left lower leg: Edema (1+) present. Skin:     General: Skin is warm and dry. Neurological:      General: No focal deficit present. Mental Status: He is alert.             Celia Denny MD

## 2023-03-03 NOTE — PATIENT INSTRUCTIONS
Increase your lisinopril to 20 mg (2 tablets) daily until you get the new dose from your pharmacy. You should continue to check your blood pressure at home. Check first thing in the morning. You should be relaxed, seated with back supported, feet flat on the floor, arm with cuff resting at heart height. You should check your blood pressure 1-3 times. Please write down your blood pressures and bring this record and your blood pressure cuff/machine to your follow-up visit. I would like for your blood pressure to be less than 130 for the top number and less than 90 for the bottom number. Please follow-up sooner for consistently high blood pressure readings at home.

## 2023-03-03 NOTE — PROGRESS NOTES
Garfield Arenas is a 81 y.o. male , id x 2(name and ). Reviewed record, history, and  medications. Chief Complaint   Patient presents with    Blood Pressure Check     4 month f/up     Documentation     Would like a  new handicap placard        Vitals:    23 0904   BP: 133/77   Pulse: 67   Temp: 98 °F (36.7 °C)   SpO2: 98%   Weight: 225 lb 3.2 oz (102.2 kg)   Height: 5' 11\" (1.803 m)       Coordination of Care Questionnaire:   1. Have you been to the ER, urgent care clinic since your last visit? Hospitalized since your last visit? No    2. Have you seen or consulted any other health care providers outside of the [address] [place] [address] since your last visit? Include any pap smears or colon screening. No      3 most recent PHQ Screens 3/3/2023   Little interest or pleasure in doing things Not at all   Feeling down, depressed, irritable, or hopeless Not at all   Total Score PHQ 2 0       Social Determinants of Health     Tobacco Use: Low Risk     Smoking Tobacco Use: Never    Smokeless Tobacco Use: Never    Passive Exposure: Not on file   Alcohol Use: Not on file   Financial Resource Strain: Unknown    Difficulty of Paying Living Expenses: Patient refused   Food Insecurity: Unknown    Worried About Running Out of Food in the Last Year: Patient refused    Ran Out of Food in the Last Year: Patient refused   Transportation Needs: Not on file   Physical Activity: Not on file   Stress: Not on file   Social Connections: Not on file   Intimate Partner Violence: Not on file   Depression: Not at risk    PHQ-2 Score: 0   Housing Stability: Not on file       Patient is accompanied by self I have received verbal consent from Garfield Arenas to discuss any/all medical information while they are present in the room.

## 2023-03-04 LAB
ALBUMIN SERPL-MCNC: 4 G/DL (ref 3.5–5)
ALBUMIN/GLOB SERPL: 1.3 (ref 1.1–2.2)
ALP SERPL-CCNC: 102 U/L (ref 45–117)
ALT SERPL-CCNC: 34 U/L (ref 12–78)
ANION GAP SERPL CALC-SCNC: 8 MMOL/L (ref 5–15)
AST SERPL-CCNC: 23 U/L (ref 15–37)
BILIRUB SERPL-MCNC: 0.9 MG/DL (ref 0.2–1)
BUN SERPL-MCNC: 16 MG/DL (ref 6–20)
BUN/CREAT SERPL: 17 (ref 12–20)
CALCIUM SERPL-MCNC: 9.2 MG/DL (ref 8.5–10.1)
CHLORIDE SERPL-SCNC: 103 MMOL/L (ref 97–108)
CHOLEST SERPL-MCNC: 116 MG/DL
CO2 SERPL-SCNC: 28 MMOL/L (ref 21–32)
CREAT SERPL-MCNC: 0.93 MG/DL (ref 0.7–1.3)
CREAT UR-MCNC: 74.6 MG/DL
ERYTHROCYTE [DISTWIDTH] IN BLOOD BY AUTOMATED COUNT: 13.4 % (ref 11.5–14.5)
EST. AVERAGE GLUCOSE BLD GHB EST-MCNC: 140 MG/DL
GLOBULIN SER CALC-MCNC: 3.1 G/DL (ref 2–4)
GLUCOSE SERPL-MCNC: 178 MG/DL (ref 65–100)
HBA1C MFR BLD: 6.5 % (ref 4–5.6)
HCT VFR BLD AUTO: 45.7 % (ref 36.6–50.3)
HDLC SERPL-MCNC: 46 MG/DL
HDLC SERPL: 2.5 (ref 0–5)
HGB BLD-MCNC: 14.7 G/DL (ref 12.1–17)
LDLC SERPL CALC-MCNC: 40.4 MG/DL (ref 0–100)
MCH RBC QN AUTO: 28.3 PG (ref 26–34)
MCHC RBC AUTO-ENTMCNC: 32.2 G/DL (ref 30–36.5)
MCV RBC AUTO: 87.9 FL (ref 80–99)
MICROALBUMIN UR-MCNC: 31 MG/DL
MICROALBUMIN/CREAT UR-RTO: 416 MG/G (ref 0–30)
NRBC # BLD: 0 K/UL (ref 0–0.01)
NRBC BLD-RTO: 0 PER 100 WBC
PLATELET # BLD AUTO: 215 K/UL (ref 150–400)
PMV BLD AUTO: 10.3 FL (ref 8.9–12.9)
POTASSIUM SERPL-SCNC: 4.1 MMOL/L (ref 3.5–5.1)
PROT SERPL-MCNC: 7.1 G/DL (ref 6.4–8.2)
RBC # BLD AUTO: 5.2 M/UL (ref 4.1–5.7)
SODIUM SERPL-SCNC: 139 MMOL/L (ref 136–145)
TRIGL SERPL-MCNC: 148 MG/DL (ref ?–150)
TSH SERPL DL<=0.05 MIU/L-ACNC: 0.7 UIU/ML (ref 0.36–3.74)
VLDLC SERPL CALC-MCNC: 29.6 MG/DL
WBC # BLD AUTO: 6.9 K/UL (ref 4.1–11.1)

## 2023-03-06 NOTE — PROGRESS NOTES
Normal CBC, TSH, nonfasting CMP. A1c at goal.  Elevated microalbuminuria, focus on diabetes and blood pressure control.   Lipids at goal.  Repeat labs 3 to 6 months

## 2023-05-15 RX ORDER — ATORVASTATIN CALCIUM 20 MG/1
TABLET, FILM COATED ORAL
Qty: 90 TABLET | Refills: 0 | Status: SHIPPED | OUTPATIENT
Start: 2023-05-15

## 2023-06-06 ENCOUNTER — APPOINTMENT (OUTPATIENT)
Facility: HOSPITAL | Age: 88
DRG: 069 | End: 2023-06-06
Payer: COMMERCIAL

## 2023-06-06 ENCOUNTER — OFFICE VISIT (OUTPATIENT)
Age: 88
End: 2023-06-06
Payer: COMMERCIAL

## 2023-06-06 ENCOUNTER — HOSPITAL ENCOUNTER (INPATIENT)
Facility: HOSPITAL | Age: 88
LOS: 1 days | Discharge: HOME | DRG: 069 | End: 2023-06-07
Attending: EMERGENCY MEDICINE | Admitting: FAMILY MEDICINE
Payer: COMMERCIAL

## 2023-06-06 VITALS
HEIGHT: 71 IN | SYSTOLIC BLOOD PRESSURE: 122 MMHG | DIASTOLIC BLOOD PRESSURE: 72 MMHG | HEART RATE: 64 BPM | WEIGHT: 215 LBS | TEMPERATURE: 97.4 F | OXYGEN SATURATION: 96 % | BODY MASS INDEX: 30.1 KG/M2

## 2023-06-06 DIAGNOSIS — I10 PRIMARY HYPERTENSION: Primary | ICD-10-CM

## 2023-06-06 DIAGNOSIS — E78.5 DYSLIPIDEMIA: ICD-10-CM

## 2023-06-06 DIAGNOSIS — G45.9 TIA (TRANSIENT ISCHEMIC ATTACK): Primary | ICD-10-CM

## 2023-06-06 DIAGNOSIS — G47.33 OSA (OBSTRUCTIVE SLEEP APNEA): ICD-10-CM

## 2023-06-06 DIAGNOSIS — H93.13 TINNITUS OF BOTH EARS: ICD-10-CM

## 2023-06-06 DIAGNOSIS — E11.9 DIABETES MELLITUS TYPE 2, DIET-CONTROLLED (HCC): ICD-10-CM

## 2023-06-06 LAB
ALBUMIN SERPL-MCNC: 4.1 G/DL (ref 3.5–5)
ALBUMIN SERPL-MCNC: 4.3 G/DL (ref 3.5–5.2)
ALBUMIN/GLOB SERPL: 1.2 (ref 1.1–2.2)
ALBUMIN/GLOB SERPL: 1.5 (ref 1.1–2.2)
ALP SERPL-CCNC: 90 U/L (ref 40–129)
ALP SERPL-CCNC: 96 U/L (ref 45–117)
ALT SERPL-CCNC: 19 U/L (ref 10–50)
ALT SERPL-CCNC: 29 U/L (ref 12–78)
ANION GAP SERPL CALC-SCNC: 10 MMOL/L (ref 5–15)
ANION GAP SERPL CALC-SCNC: 13 MMOL/L (ref 5–15)
AST SERPL-CCNC: 24 U/L (ref 10–50)
AST SERPL-CCNC: 29 U/L (ref 15–37)
BASOPHILS # BLD: 0.1 K/UL (ref 0–1)
BASOPHILS NFR BLD: 1 % (ref 0–1)
BILIRUB SERPL-MCNC: 1 MG/DL (ref 0.2–1)
BILIRUB SERPL-MCNC: 1.1 MG/DL (ref 0.2–1)
BUN SERPL-MCNC: 18 MG/DL (ref 6–20)
BUN SERPL-MCNC: 18 MG/DL (ref 8–23)
BUN/CREAT SERPL: 17 (ref 12–20)
BUN/CREAT SERPL: 19 (ref 12–20)
CALCIUM SERPL-MCNC: 9.2 MG/DL (ref 8.5–10.1)
CALCIUM SERPL-MCNC: 9.3 MG/DL (ref 8.8–10.2)
CHLORIDE SERPL-SCNC: 103 MMOL/L (ref 97–108)
CHLORIDE SERPL-SCNC: 103 MMOL/L (ref 98–107)
CHOLEST SERPL-MCNC: 138 MG/DL
CO2 SERPL-SCNC: 25 MMOL/L (ref 22–29)
CO2 SERPL-SCNC: 27 MMOL/L (ref 21–32)
COMMENT:: NORMAL
CREAT SERPL-MCNC: 0.93 MG/DL (ref 0.7–1.3)
CREAT SERPL-MCNC: 1.06 MG/DL (ref 0.7–1.2)
DIFFERENTIAL METHOD BLD: ABNORMAL
EOSINOPHIL # BLD: 0.3 K/UL (ref 0–0.4)
EOSINOPHIL NFR BLD: 3 % (ref 0–7)
ERYTHROCYTE [DISTWIDTH] IN BLOOD BY AUTOMATED COUNT: 13.7 % (ref 11.5–14.5)
EST. AVERAGE GLUCOSE BLD GHB EST-MCNC: 131 MG/DL
GLOBULIN SER CALC-MCNC: 2.8 G/DL (ref 2–4)
GLOBULIN SER CALC-MCNC: 3.4 G/DL (ref 2–4)
GLUCOSE BLD STRIP.AUTO-MCNC: 129 MG/DL (ref 65–117)
GLUCOSE SERPL-MCNC: 135 MG/DL (ref 65–100)
GLUCOSE SERPL-MCNC: 142 MG/DL (ref 65–100)
HBA1C MFR BLD: 6.2 % (ref 4–5.6)
HCT VFR BLD AUTO: 44.2 % (ref 36.6–50.3)
HDLC SERPL-MCNC: 50 MG/DL
HDLC SERPL: 2.8 (ref 0–5)
HGB BLD-MCNC: 14.6 G/DL (ref 12.1–17)
IMM GRANULOCYTES # BLD AUTO: 0.1 K/UL (ref 0–0.04)
IMM GRANULOCYTES NFR BLD AUTO: 1 % (ref 0–0.5)
INR PPP: 1.1 (ref 0.9–1.1)
LDLC SERPL CALC-MCNC: 63 MG/DL (ref 0–100)
LYMPHOCYTES # BLD: 1.3 K/UL (ref 0.8–3.5)
LYMPHOCYTES NFR BLD: 17 % (ref 12–49)
MCH RBC QN AUTO: 28.6 PG (ref 26–34)
MCHC RBC AUTO-ENTMCNC: 33 G/DL (ref 30–36.5)
MCV RBC AUTO: 86.7 FL (ref 80–99)
MONOCYTES # BLD: 0.6 K/UL (ref 0–1)
MONOCYTES NFR BLD: 8 % (ref 5–13)
NEUTS SEG # BLD: 5.4 K/UL (ref 1.8–8)
NEUTS SEG NFR BLD: 70 % (ref 32–75)
NRBC # BLD: 0 K/UL (ref 0–0.01)
NRBC BLD-RTO: 0 PER 100 WBC
PLATELET # BLD AUTO: 199 K/UL (ref 150–400)
PMV BLD AUTO: 10 FL (ref 8.9–12.9)
POTASSIUM SERPL-SCNC: 4.3 MMOL/L (ref 3.5–5.1)
POTASSIUM SERPL-SCNC: 4.5 MMOL/L (ref 3.5–5.1)
PROT SERPL-MCNC: 7.1 G/DL (ref 6.4–8.3)
PROT SERPL-MCNC: 7.5 G/DL (ref 6.4–8.2)
PROTHROMBIN TIME: 14.4 SEC (ref 11.9–14.6)
RBC # BLD AUTO: 5.1 M/UL (ref 4.1–5.7)
SERVICE CMNT-IMP: ABNORMAL
SODIUM SERPL-SCNC: 140 MMOL/L (ref 136–145)
SODIUM SERPL-SCNC: 141 MMOL/L (ref 136–145)
SPECIMEN HOLD: NORMAL
TRIGL SERPL-MCNC: 125 MG/DL
TROPONIN I BLD-MCNC: <0.04 NG/ML (ref 0–0.08)
VLDLC SERPL CALC-MCNC: 25 MG/DL
WBC # BLD AUTO: 7.6 K/UL (ref 4.1–11.1)

## 2023-06-06 PROCEDURE — 99285 EMERGENCY DEPT VISIT HI MDM: CPT

## 2023-06-06 PROCEDURE — G8419 CALC BMI OUT NRM PARAM NOF/U: HCPCS | Performed by: STUDENT IN AN ORGANIZED HEALTH CARE EDUCATION/TRAINING PROGRAM

## 2023-06-06 PROCEDURE — 36415 COLL VENOUS BLD VENIPUNCTURE: CPT

## 2023-06-06 PROCEDURE — 85025 COMPLETE CBC W/AUTO DIFF WBC: CPT

## 2023-06-06 PROCEDURE — 80053 COMPREHEN METABOLIC PANEL: CPT

## 2023-06-06 PROCEDURE — 0042T CT BRAIN PERFUSION: CPT

## 2023-06-06 PROCEDURE — 84484 ASSAY OF TROPONIN QUANT: CPT

## 2023-06-06 PROCEDURE — 82962 GLUCOSE BLOOD TEST: CPT

## 2023-06-06 PROCEDURE — G8427 DOCREV CUR MEDS BY ELIG CLIN: HCPCS | Performed by: STUDENT IN AN ORGANIZED HEALTH CARE EDUCATION/TRAINING PROGRAM

## 2023-06-06 PROCEDURE — 85610 PROTHROMBIN TIME: CPT

## 2023-06-06 PROCEDURE — A9579 GAD-BASE MR CONTRAST NOS,1ML: HCPCS

## 2023-06-06 PROCEDURE — 6370000000 HC RX 637 (ALT 250 FOR IP): Performed by: STUDENT IN AN ORGANIZED HEALTH CARE EDUCATION/TRAINING PROGRAM

## 2023-06-06 PROCEDURE — 6370000000 HC RX 637 (ALT 250 FOR IP): Performed by: EMERGENCY MEDICINE

## 2023-06-06 PROCEDURE — 81001 URINALYSIS AUTO W/SCOPE: CPT

## 2023-06-06 PROCEDURE — 1036F TOBACCO NON-USER: CPT | Performed by: STUDENT IN AN ORGANIZED HEALTH CARE EDUCATION/TRAINING PROGRAM

## 2023-06-06 PROCEDURE — 6360000004 HC RX CONTRAST MEDICATION: Performed by: EMERGENCY MEDICINE

## 2023-06-06 PROCEDURE — 70498 CT ANGIOGRAPHY NECK: CPT

## 2023-06-06 PROCEDURE — 99214 OFFICE O/P EST MOD 30 MIN: CPT | Performed by: STUDENT IN AN ORGANIZED HEALTH CARE EDUCATION/TRAINING PROGRAM

## 2023-06-06 PROCEDURE — 70450 CT HEAD/BRAIN W/O DYE: CPT

## 2023-06-06 PROCEDURE — 3044F HG A1C LEVEL LT 7.0%: CPT | Performed by: STUDENT IN AN ORGANIZED HEALTH CARE EDUCATION/TRAINING PROGRAM

## 2023-06-06 PROCEDURE — 1123F ACP DISCUSS/DSCN MKR DOCD: CPT | Performed by: STUDENT IN AN ORGANIZED HEALTH CARE EDUCATION/TRAINING PROGRAM

## 2023-06-06 PROCEDURE — 93005 ELECTROCARDIOGRAM TRACING: CPT | Performed by: EMERGENCY MEDICINE

## 2023-06-06 PROCEDURE — 80307 DRUG TEST PRSMV CHEM ANLYZR: CPT

## 2023-06-06 PROCEDURE — 70553 MRI BRAIN STEM W/O & W/DYE: CPT

## 2023-06-06 PROCEDURE — 1100000000 HC RM PRIVATE

## 2023-06-06 PROCEDURE — 6360000004 HC RX CONTRAST MEDICATION

## 2023-06-06 RX ORDER — ONDANSETRON 4 MG/1
4 TABLET, ORALLY DISINTEGRATING ORAL EVERY 8 HOURS PRN
Status: DISCONTINUED | OUTPATIENT
Start: 2023-06-06 | End: 2023-06-07 | Stop reason: HOSPADM

## 2023-06-06 RX ORDER — ASPIRIN 81 MG/1
81 TABLET ORAL DAILY
Status: DISCONTINUED | OUTPATIENT
Start: 2023-06-06 | End: 2023-06-07 | Stop reason: HOSPADM

## 2023-06-06 RX ORDER — METOPROLOL SUCCINATE 50 MG/1
100 TABLET, EXTENDED RELEASE ORAL DAILY
Status: DISCONTINUED | OUTPATIENT
Start: 2023-06-06 | End: 2023-06-07 | Stop reason: HOSPADM

## 2023-06-06 RX ORDER — AMLODIPINE BESYLATE 5 MG/1
10 TABLET ORAL DAILY
Status: DISCONTINUED | OUTPATIENT
Start: 2023-06-06 | End: 2023-06-07 | Stop reason: HOSPADM

## 2023-06-06 RX ORDER — ENOXAPARIN SODIUM 100 MG/ML
40 INJECTION SUBCUTANEOUS DAILY
Status: DISCONTINUED | OUTPATIENT
Start: 2023-06-06 | End: 2023-06-06

## 2023-06-06 RX ORDER — ONDANSETRON 2 MG/ML
4 INJECTION INTRAMUSCULAR; INTRAVENOUS EVERY 6 HOURS PRN
Status: DISCONTINUED | OUTPATIENT
Start: 2023-06-06 | End: 2023-06-07 | Stop reason: HOSPADM

## 2023-06-06 RX ORDER — PANTOPRAZOLE SODIUM 40 MG/1
40 TABLET, DELAYED RELEASE ORAL DAILY
Status: DISCONTINUED | OUTPATIENT
Start: 2023-06-07 | End: 2023-06-07 | Stop reason: HOSPADM

## 2023-06-06 RX ORDER — LISINOPRIL 20 MG/1
20 TABLET ORAL DAILY
Status: DISCONTINUED | OUTPATIENT
Start: 2023-06-06 | End: 2023-06-07 | Stop reason: HOSPADM

## 2023-06-06 RX ORDER — TROSPIUM CHLORIDE 20 MG/1
20 TABLET, FILM COATED ORAL
Status: DISCONTINUED | OUTPATIENT
Start: 2023-06-07 | End: 2023-06-07 | Stop reason: HOSPADM

## 2023-06-06 RX ORDER — ATORVASTATIN CALCIUM 20 MG/1
80 TABLET, FILM COATED ORAL NIGHTLY
Status: DISCONTINUED | OUTPATIENT
Start: 2023-06-06 | End: 2023-06-07 | Stop reason: HOSPADM

## 2023-06-06 RX ORDER — CLOPIDOGREL BISULFATE 75 MG/1
75 TABLET ORAL DAILY
Status: DISCONTINUED | OUTPATIENT
Start: 2023-06-07 | End: 2023-06-07

## 2023-06-06 RX ORDER — POLYETHYLENE GLYCOL 3350 17 G/17G
17 POWDER, FOR SOLUTION ORAL DAILY PRN
Status: DISCONTINUED | OUTPATIENT
Start: 2023-06-06 | End: 2023-06-07 | Stop reason: HOSPADM

## 2023-06-06 RX ORDER — CLOPIDOGREL 300 MG/1
300 TABLET, FILM COATED ORAL ONCE
Status: COMPLETED | OUTPATIENT
Start: 2023-06-06 | End: 2023-06-06

## 2023-06-06 RX ADMIN — CLOPIDOGREL BISULFATE 300 MG: 300 TABLET, FILM COATED ORAL at 17:23

## 2023-06-06 RX ADMIN — ATORVASTATIN CALCIUM 80 MG: 20 TABLET, FILM COATED ORAL at 23:07

## 2023-06-06 RX ADMIN — ASPIRIN 81 MG: 81 TABLET, COATED ORAL at 23:07

## 2023-06-06 RX ADMIN — GADOTERIDOL 19 ML: 279.3 INJECTION, SOLUTION INTRAVENOUS at 21:57

## 2023-06-06 RX ADMIN — IOPAMIDOL 100 ML: 755 INJECTION, SOLUTION INTRAVENOUS at 16:13

## 2023-06-06 SDOH — ECONOMIC STABILITY: FOOD INSECURITY: WITHIN THE PAST 12 MONTHS, THE FOOD YOU BOUGHT JUST DIDN'T LAST AND YOU DIDN'T HAVE MONEY TO GET MORE.: NEVER TRUE

## 2023-06-06 SDOH — ECONOMIC STABILITY: HOUSING INSECURITY
IN THE LAST 12 MONTHS, WAS THERE A TIME WHEN YOU DID NOT HAVE A STEADY PLACE TO SLEEP OR SLEPT IN A SHELTER (INCLUDING NOW)?: NO

## 2023-06-06 SDOH — ECONOMIC STABILITY: INCOME INSECURITY: HOW HARD IS IT FOR YOU TO PAY FOR THE VERY BASICS LIKE FOOD, HOUSING, MEDICAL CARE, AND HEATING?: NOT HARD AT ALL

## 2023-06-06 SDOH — ECONOMIC STABILITY: FOOD INSECURITY: WITHIN THE PAST 12 MONTHS, YOU WORRIED THAT YOUR FOOD WOULD RUN OUT BEFORE YOU GOT MONEY TO BUY MORE.: NEVER TRUE

## 2023-06-06 ASSESSMENT — ENCOUNTER SYMPTOMS
COUGH: 0
RESPIRATORY NEGATIVE: 1
GASTROINTESTINAL NEGATIVE: 1
VOMITING: 0
EYES NEGATIVE: 1
NAUSEA: 0
SORE THROAT: 0
SHORTNESS OF BREATH: 0
ABDOMINAL PAIN: 0

## 2023-06-06 ASSESSMENT — PATIENT HEALTH QUESTIONNAIRE - PHQ9
SUM OF ALL RESPONSES TO PHQ9 QUESTIONS 1 & 2: 0
SUM OF ALL RESPONSES TO PHQ QUESTIONS 1-9: 0
1. LITTLE INTEREST OR PLEASURE IN DOING THINGS: 0
2. FEELING DOWN, DEPRESSED OR HOPELESS: 0
SUM OF ALL RESPONSES TO PHQ QUESTIONS 1-9: 0

## 2023-06-06 ASSESSMENT — LIFESTYLE VARIABLES
HOW OFTEN DO YOU HAVE A DRINK CONTAINING ALCOHOL: NEVER
HOW MANY STANDARD DRINKS CONTAINING ALCOHOL DO YOU HAVE ON A TYPICAL DAY: PATIENT DOES NOT DRINK

## 2023-06-06 ASSESSMENT — PAIN - FUNCTIONAL ASSESSMENT: PAIN_FUNCTIONAL_ASSESSMENT: NONE - DENIES PAIN

## 2023-06-06 NOTE — H&P
to LAD     Dyslipidemia     Factor 5 Leiden mutation, heterozygous ([facility]     H/O carpal tunnel repair     HTN (hypertension)     Prostate CA ([facility] [facility]     radiation treatments        Previous Hospitalization(s)  Past Surgical History:   Procedure Laterality Date    CORONARY ANGIOPLASTY WITH STENT PLACEMENT  2013    HX OPEN REDUCTION INTERNAL FIXATION Left 05/2021    left patella    [place] UNLISTED PROCEDURE CARDIAC SURGERY         Family History  Family History   Problem Relation Age of Onset    Cancer Mother     Heart Disease Father        Social History  Alcohol history: None  Smoking history: Non-smoker  Illicit drug history: Not at all  Living arrangement: patient lives with their family. Ambulates: Independently     Vital Signs  Vitals:    06/06/23 1815   BP: 137/78   Pulse: 76   Resp: 19   Temp: 98.4 °F (36.9 °C)   SpO2: 94%       Physical Exam  General: no acute distress. Alert. Cooperative. Head: Normocephalic. Atraumatic. Eyes:              Conjunctiva pink. Sclera white. PERRL. Ears:              Hearing grossly intact. Nose:             Septum midline. Mucosa pink. No drainage. Throat: Mucosa pink. Moist mucous membranes. No tonsillar exudates or erythema. Palate movement equal bilaterally. Neck: Supple. Normal ROM. No stiffness. Respiratory: CTAB. No w/r/r/c.   Cardiovascular: RRR. Normal S1,S2. No m/r/g. Pulses 2+ throughout. GI: + bowel sounds. Nontender. No rebound tenderness or guarding. Nondistended. Extremities: no LE edema. Distal pulses intact. Musculoskeletal: Full ROM in all extremities. Skin: Warm, dry. No rashes. Neuro: CN II-XII grossly intact. Strength 5/5 in all extremities.        Laboratory Data  Recent Results (from the past 8 hour(s))   POCT Glucose    Collection Time: 06/06/23  3:49 PM   Result Value Ref Range    POC Glucose 129 (H) 65 - 117 mg/dL    Performed by: Becca Norman    CBC with Auto Differential    Collection Time: 06/06/23  3:59 PM   Result Value Ref
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
## 2023-06-06 NOTE — ED NOTES
Pt transported to CT via stretcher with cardiac monitor x 3 by Anupama Lovelace RN, and Lucy.      Maribel Hughes RN  06/06/23 8404

## 2023-06-06 NOTE — ED NOTES
TRANSFER - OUT REPORT:    Verbal report given to [facility] RN on Jose Lopez  being transferred to [facility] ED for routine progression of patient care       Report consisted of patient's Situation, Background, Assessment and   Recommendations(SBAR). Information from the following report(s) ED Encounter Summary, ED SBAR, [facility], and Recent Results was reviewed with the receiving nurse. Cascade Assessment: Presents to emergency department  because of falls (Syncope, seizure, or loss of consciousness): No, Age > 79: Yes, Altered Mental Status, Intoxication with alcohol or substance confusion (Disorientation, impaired judgment, poor safety awaremess, or inability to follow instructions): No, Impaired Mobility: Ambulates or transfers with assistive devices or assistance; Unable to ambulate or transer.: No, Nursing Judgement: No  Lines:   Peripheral IV 06/06/23 Right Antecubital (Active)   Site Assessment Clean, dry & intact 06/06/23 1710   Phlebitis Assessment No symptoms 06/06/23 1710   Infiltration Assessment 0 06/06/23 1710   Dressing Intervention New 06/06/23 1710        Opportunity for questions and clarification was provided.       Patient transported with:  Monitor          Tye Mon RN  06/06/23 9841

## 2023-06-06 NOTE — ED NOTES
H2H at bedside, SBAR report given. Pt being transported to [facility].       Santhosh Cole RN  06/06/23 1740

## 2023-06-06 NOTE — ED TRIAGE NOTES
Pt presents ambulatory into ed a&ox3, no acute distress, breaths even and unlabored c/o episode of aphasia, LKW 1245, lasting about 5-10min according to family. No focal deficits or slurred speech during episode. Pt presents with no neuro deficits at time of triage. Level I stroke activated.

## 2023-06-07 ENCOUNTER — APPOINTMENT (OUTPATIENT)
Facility: HOSPITAL | Age: 88
DRG: 069 | End: 2023-06-07
Attending: STUDENT IN AN ORGANIZED HEALTH CARE EDUCATION/TRAINING PROGRAM
Payer: COMMERCIAL

## 2023-06-07 ENCOUNTER — APPOINTMENT (OUTPATIENT)
Dept: VASCULAR SURGERY | Facility: HOSPITAL | Age: 88
DRG: 069 | End: 2023-06-07
Payer: COMMERCIAL

## 2023-06-07 VITALS
RESPIRATION RATE: 19 BRPM | HEIGHT: 71 IN | SYSTOLIC BLOOD PRESSURE: 129 MMHG | HEART RATE: 79 BPM | BODY MASS INDEX: 30.1 KG/M2 | TEMPERATURE: 97.8 F | OXYGEN SATURATION: 95 % | DIASTOLIC BLOOD PRESSURE: 90 MMHG | WEIGHT: 215 LBS

## 2023-06-07 LAB
AMPHET UR QL SCN: NEGATIVE
ANION GAP SERPL CALC-SCNC: 5 MMOL/L (ref 5–15)
APPEARANCE UR: CLEAR
BACTERIA URNS QL MICRO: NEGATIVE /HPF
BARBITURATES UR QL SCN: NEGATIVE
BENZODIAZ UR QL: NEGATIVE
BILIRUB UR QL: NEGATIVE
BUN SERPL-MCNC: 15 MG/DL (ref 6–20)
BUN/CREAT SERPL: 19 (ref 12–20)
CALCIUM SERPL-MCNC: 8.8 MG/DL (ref 8.5–10.1)
CANNABINOIDS UR QL SCN: NEGATIVE
CHLORIDE SERPL-SCNC: 108 MMOL/L (ref 97–108)
CO2 SERPL-SCNC: 26 MMOL/L (ref 21–32)
COCAINE UR QL SCN: NEGATIVE
COLOR UR: ABNORMAL
CREAT SERPL-MCNC: 0.81 MG/DL (ref 0.7–1.3)
ECHO AO ASC DIAM: 3.6 CM
ECHO AO ASCENDING AORTA INDEX: 1.66 CM/M2
ECHO AV AREA PEAK VELOCITY: 2.2 CM2
ECHO AV AREA VTI: 2.2 CM2
ECHO AV AREA/BSA PEAK VELOCITY: 1 CM2/M2
ECHO AV AREA/BSA VTI: 1 CM2/M2
ECHO AV MEAN GRADIENT: 4 MMHG
ECHO AV MEAN VELOCITY: 1 M/S
ECHO AV PEAK GRADIENT: 8 MMHG
ECHO AV PEAK VELOCITY: 1.4 M/S
ECHO AV VELOCITY RATIO: 0.57
ECHO AV VTI: 30.3 CM
ECHO BSA: 2.21 M2
ECHO LA DIAMETER INDEX: 1.47 CM/M2
ECHO LA DIAMETER: 3.2 CM
ECHO LV EDV A2C: 78 ML
ECHO LV EDV A4C: 127 ML
ECHO LV EDV BP: 101 ML (ref 67–155)
ECHO LV EDV INDEX A4C: 59 ML/M2
ECHO LV EDV INDEX BP: 47 ML/M2
ECHO LV EDV NDEX A2C: 36 ML/M2
ECHO LV EJECTION FRACTION A2C: 53 %
ECHO LV EJECTION FRACTION A4C: 69 %
ECHO LV EJECTION FRACTION BIPLANE: 59 % (ref 55–100)
ECHO LV ESV A2C: 37 ML
ECHO LV ESV A4C: 39 ML
ECHO LV ESV BP: 41 ML (ref 22–58)
ECHO LV ESV INDEX A2C: 17 ML/M2
ECHO LV ESV INDEX A4C: 18 ML/M2
ECHO LV ESV INDEX BP: 19 ML/M2
ECHO LV FRACTIONAL SHORTENING: 35 % (ref 28–44)
ECHO LV INTERNAL DIMENSION DIASTOLE INDEX: 2.21 CM/M2
ECHO LV INTERNAL DIMENSION DIASTOLIC: 4.8 CM (ref 4.2–5.9)
ECHO LV INTERNAL DIMENSION SYSTOLIC INDEX: 1.43 CM/M2
ECHO LV INTERNAL DIMENSION SYSTOLIC: 3.1 CM
ECHO LV IVSD: 1 CM (ref 0.6–1)
ECHO LV MASS 2D: 170.2 G (ref 88–224)
ECHO LV MASS INDEX 2D: 78.4 G/M2 (ref 49–115)
ECHO LV POSTERIOR WALL DIASTOLIC: 1 CM (ref 0.6–1)
ECHO LV RELATIVE WALL THICKNESS RATIO: 0.42
ECHO LVOT AREA: 3.8 CM2
ECHO LVOT AV VTI INDEX: 0.59
ECHO LVOT DIAM: 2.2 CM
ECHO LVOT MEAN GRADIENT: 2 MMHG
ECHO LVOT PEAK GRADIENT: 3 MMHG
ECHO LVOT PEAK VELOCITY: 0.8 M/S
ECHO LVOT STROKE VOLUME INDEX: 31.5 ML/M2
ECHO LVOT SV: 68.4 ML
ECHO LVOT VTI: 18 CM
ECHO PV MAX VELOCITY: 1.2 M/S
ECHO PV MEAN GRADIENT: 3 MMHG
ECHO PV MEAN VELOCITY: 0.9 M/S
ECHO PV PEAK GRADIENT: 6 MMHG
ECHO TV REGURGITANT MAX VELOCITY: 1.95 M/S
ECHO TV REGURGITANT PEAK GRADIENT: 15 MMHG
EPITH CASTS URNS QL MICRO: ABNORMAL /LPF
ERYTHROCYTE [DISTWIDTH] IN BLOOD BY AUTOMATED COUNT: 13.9 % (ref 11.5–14.5)
ETHANOL SERPL-MCNC: <10 MG/DL (ref 0–0.08)
GLUCOSE SERPL-MCNC: 124 MG/DL (ref 65–100)
GLUCOSE UR STRIP.AUTO-MCNC: NEGATIVE MG/DL
HCT VFR BLD AUTO: 42.3 % (ref 36.6–50.3)
HGB BLD-MCNC: 14.4 G/DL (ref 12.1–17)
HGB UR QL STRIP: ABNORMAL
HYALINE CASTS URNS QL MICRO: ABNORMAL /LPF (ref 0–2)
KETONES UR QL STRIP.AUTO: NEGATIVE MG/DL
LEUKOCYTE ESTERASE UR QL STRIP.AUTO: ABNORMAL
Lab: NORMAL
MAGNESIUM SERPL-MCNC: 2.1 MG/DL (ref 1.6–2.4)
MCH RBC QN AUTO: 28.5 PG (ref 26–34)
MCHC RBC AUTO-ENTMCNC: 34 G/DL (ref 30–36.5)
MCV RBC AUTO: 83.6 FL (ref 80–99)
METHADONE UR QL: NEGATIVE
NITRITE UR QL STRIP.AUTO: NEGATIVE
NRBC # BLD: 0 K/UL (ref 0–0.01)
NRBC BLD-RTO: 0 PER 100 WBC
OPIATES UR QL: NEGATIVE
PCP UR QL: NEGATIVE
PH UR STRIP: 7.5 (ref 5–8)
PHOSPHATE SERPL-MCNC: 3 MG/DL (ref 2.6–4.7)
PLATELET # BLD AUTO: 206 K/UL (ref 150–400)
PMV BLD AUTO: 9.7 FL (ref 8.9–12.9)
POTASSIUM SERPL-SCNC: 3.6 MMOL/L (ref 3.5–5.1)
PROT UR STRIP-MCNC: 30 MG/DL
RBC # BLD AUTO: 5.06 M/UL (ref 4.1–5.7)
RBC #/AREA URNS HPF: ABNORMAL /HPF (ref 0–5)
RPR SER QL: NONREACTIVE
SODIUM SERPL-SCNC: 139 MMOL/L (ref 136–145)
SP GR UR REFRACTOMETRY: 1.02 (ref 1–1.03)
SPECIMEN HOLD: NORMAL
TROPONIN I SERPL HS-MCNC: 7 NG/L (ref 0–76)
TSH SERPL DL<=0.05 MIU/L-ACNC: 0.87 UIU/ML (ref 0.36–3.74)
UROBILINOGEN UR QL STRIP.AUTO: 1 EU/DL (ref 0.2–1)
WBC # BLD AUTO: 5.7 K/UL (ref 4.1–11.1)
WBC URNS QL MICRO: >100 /HPF (ref 0–4)

## 2023-06-07 PROCEDURE — 2580000003 HC RX 258

## 2023-06-07 PROCEDURE — 86592 SYPHILIS TEST NON-TREP QUAL: CPT

## 2023-06-07 PROCEDURE — 93321 DOPPLER ECHO F-UP/LMTD STD: CPT

## 2023-06-07 PROCEDURE — 80048 BASIC METABOLIC PNL TOTAL CA: CPT

## 2023-06-07 PROCEDURE — 99223 1ST HOSP IP/OBS HIGH 75: CPT | Performed by: PSYCHIATRY & NEUROLOGY

## 2023-06-07 PROCEDURE — 84484 ASSAY OF TROPONIN QUANT: CPT

## 2023-06-07 PROCEDURE — 6360000002 HC RX W HCPCS

## 2023-06-07 PROCEDURE — 36415 COLL VENOUS BLD VENIPUNCTURE: CPT

## 2023-06-07 PROCEDURE — 92610 EVALUATE SWALLOWING FUNCTION: CPT

## 2023-06-07 PROCEDURE — 97116 GAIT TRAINING THERAPY: CPT

## 2023-06-07 PROCEDURE — 85027 COMPLETE CBC AUTOMATED: CPT

## 2023-06-07 PROCEDURE — 83735 ASSAY OF MAGNESIUM: CPT

## 2023-06-07 PROCEDURE — 93308 TTE F-UP OR LMTD: CPT | Performed by: INTERNAL MEDICINE

## 2023-06-07 PROCEDURE — 82077 ASSAY SPEC XCP UR&BREATH IA: CPT

## 2023-06-07 PROCEDURE — 84100 ASSAY OF PHOSPHORUS: CPT

## 2023-06-07 PROCEDURE — 84443 ASSAY THYROID STIM HORMONE: CPT

## 2023-06-07 PROCEDURE — 97165 OT EVAL LOW COMPLEX 30 MIN: CPT

## 2023-06-07 PROCEDURE — 97535 SELF CARE MNGMENT TRAINING: CPT

## 2023-06-07 PROCEDURE — 93880 EXTRACRANIAL BILAT STUDY: CPT

## 2023-06-07 PROCEDURE — 97161 PT EVAL LOW COMPLEX 20 MIN: CPT

## 2023-06-07 PROCEDURE — 93321 DOPPLER ECHO F-UP/LMTD STD: CPT | Performed by: INTERNAL MEDICINE

## 2023-06-07 PROCEDURE — 6370000000 HC RX 637 (ALT 250 FOR IP): Performed by: STUDENT IN AN ORGANIZED HEALTH CARE EDUCATION/TRAINING PROGRAM

## 2023-06-07 PROCEDURE — 94761 N-INVAS EAR/PLS OXIMETRY MLT: CPT

## 2023-06-07 PROCEDURE — 87086 URINE CULTURE/COLONY COUNT: CPT

## 2023-06-07 PROCEDURE — 93325 DOPPLER ECHO COLOR FLOW MAPG: CPT | Performed by: INTERNAL MEDICINE

## 2023-06-07 RX ORDER — ASPIRIN 81 MG/1
81 TABLET ORAL DAILY
Qty: 30 TABLET | Refills: 0 | Status: SHIPPED
Start: 2023-06-07

## 2023-06-07 RX ORDER — ATORVASTATIN CALCIUM 20 MG/1
80 TABLET, FILM COATED ORAL EVERY MORNING
Qty: 30 TABLET | Refills: 0 | Status: SHIPPED | OUTPATIENT
Start: 2023-06-07 | End: 2023-06-07 | Stop reason: HOSPADM

## 2023-06-07 RX ORDER — CYANOCOBALAMIN (VITAMIN B-12) 500 MCG
300 TABLET ORAL 2 TIMES DAILY
COMMUNITY

## 2023-06-07 RX ORDER — ATORVASTATIN CALCIUM 80 MG/1
80 TABLET, FILM COATED ORAL
Qty: 30 TABLET | Refills: 0 | Status: SHIPPED | OUTPATIENT
Start: 2023-06-07

## 2023-06-07 RX ORDER — CEFDINIR 300 MG/1
300 CAPSULE ORAL 2 TIMES DAILY
Qty: 20 CAPSULE | Refills: 0 | Status: SHIPPED | OUTPATIENT
Start: 2023-06-07 | End: 2023-06-07 | Stop reason: SDUPTHER

## 2023-06-07 RX ORDER — ATORVASTATIN CALCIUM 20 MG/1
20 TABLET, FILM COATED ORAL
Status: ON HOLD | COMMUNITY
End: 2023-06-07 | Stop reason: SDUPTHER

## 2023-06-07 RX ORDER — CEFDINIR 300 MG/1
300 CAPSULE ORAL 2 TIMES DAILY
Qty: 18 CAPSULE | Refills: 0 | Status: SHIPPED | OUTPATIENT
Start: 2023-06-08 | End: 2023-06-17

## 2023-06-07 RX ORDER — ASPIRIN 81 MG/1
81 TABLET, CHEWABLE ORAL DAILY
Status: ON HOLD | COMMUNITY
End: 2023-06-07 | Stop reason: HOSPADM

## 2023-06-07 RX ADMIN — TROSPIUM CHLORIDE 20 MG: 20 TABLET, FILM COATED ORAL at 09:26

## 2023-06-07 RX ADMIN — CLOPIDOGREL BISULFATE 75 MG: 75 TABLET ORAL at 08:02

## 2023-06-07 RX ADMIN — PANTOPRAZOLE SODIUM 40 MG: 40 TABLET, DELAYED RELEASE ORAL at 06:24

## 2023-06-07 RX ADMIN — CEFTRIAXONE 1000 MG: 1 INJECTION, POWDER, FOR SOLUTION INTRAMUSCULAR; INTRAVENOUS at 09:33

## 2023-06-07 RX ADMIN — ASPIRIN 81 MG: 81 TABLET, COATED ORAL at 08:02

## 2023-06-07 ASSESSMENT — ENCOUNTER SYMPTOMS
ABDOMINAL PAIN: 0
SHORTNESS OF BREATH: 0
COLOR CHANGE: 0
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
## 2023-06-07 NOTE — CARE COORDINATION
Family able to assist with home care needs: Yes   Would you like for me to discuss the discharge plan with any other family members/significant others, and if so, who?  No   Financial Resources None   [employer] None     [employer], [address]

## 2023-06-07 NOTE — DISCHARGE SUMMARY
acute bleed, normal perfusion scan, and 60-70% stenosis of the proximal R internal carotid. MRI brain NAP. 60-70% R ICA stenosis on CTA but carotid duplex within normal limits. ECHO w/ EF 55-60  - Continue ASA and statin daily     Hypertension:   - Continue home lisinopril 20, amlodipine 10, metoprolol 100mg     DMII: A1c 6.5 on 3/3/23 with repeat on 6/6/23 of 6.3. On home metformin 500mg.  - Continue home medications     Hyperlipidemia: On home lipitor 20mg. Repeat lipid panel 6/6/23 with LDL of 63  - Start Lipitor 80 mg daily. GERD:   - Continue home Protonix 40mg daily. Seasonal allergies:   - home cetirizine 10 mg daily. BPH, urinary incontinence:   - Continue home Flomax 0.4mg daily and myrbetriq    Physical exam at discharge:    Vitals Reviewed. Vitals:    06/07/23 1515   BP: (!) 129/90   Pulse: 79   Resp: 19   Temp: 97.8 °F (36.6 °C)   SpO2: 95%        General Oriented to person, place, and time and well-developed. Appears well-nourished, no distress. Not diaphoretic. HENT Head Normocephalic and atraumatic. Eyes Conjunctivae are normal, no discharge. No scleral icterus. Nose Nose normal, clear turbinates. Oral Oropharynx is clear and moist. No oropharyngeal exudate. Neck No thyromegaly present. No cervical adenopathy. Cardio Normal rate, regular rhythm. Exam reveals no gallop and no friction rub. No murmur heard. Pulmonary Effort normal and breath sounds normal. No respiratory distress. No wheezes, no rales. Abdominal Soft. Bowel sounds normal. No distension. No tenderness. Extremities No edema of lower extremities. No tenderness. Distal pulses intact. Neurological Alert and oriented to person, place, and time. Dermatology Skin is warm and dry. No rash noted. No erythema or pallor.     Psychiatric Affect and judgment normal.        Condition at discharge: Stable    Labs  Recent Labs     06/06/23  1559 06/07/23  0238   WBC 7.6 5.7   HGB 14.6 14.4   HCT 44.2

## 2023-06-07 NOTE — PLAN OF CARE
step independently and hold 30 seconds  14. Standing on One Leg: Tries to lift leg unable to hold 3 seconds but remains standing independently  Hopkins Balance Score: 41         56=Maximum possible score;   0-20=High fall risk  21-40=Moderate fall risk   41-56=Low fall risk                                                                                                                                                                                                                                     Pain Ratin/10  Pain Intervention(s):        Activity Tolerance:   Good and tolerates ADLS without rest breaks    After treatment:   Patient left in no apparent distress sitting up in chair and Call bell within reach    COMMUNICATION/EDUCATION:   The patient's plan of care was discussed with: occupational therapist, registered nurse, and physician    Patient Education  Education Given To: Patient  Education Provided: Role of Therapy;Transfer Training;Plan of Care;Orientation  Education Method: Demonstration;Verbal  Barriers to Learning: None  Education Outcome: Verbalized understanding    Thank you for this referral.  Agustina Stahl, PT, DPT  Minutes: 16

## 2023-06-07 NOTE — ED NOTES
Handed over to [facility], [address] [place] [address]  06/07/23 8729

## 2023-06-07 NOTE — ED NOTES
Stroke Education provided to patient and the following topics were discussed    1. Patients personal risk factors for stroke are diabetes mellitus, hyperlipidemia, and hypertension    2. Warning signs of Stroke:        * Sudden numbness or weakness of the face, arm or leg, especially on one side of          The body            * Sudden confusion, trouble speaking or understanding        * Sudden trouble seeing in one or both eyes        * Sudden trouble walking, dizziness, loss of balance or coordination        * Sudden severe headache with no known cause      3. Importance of activation Emergency Medical Services ( 9-1-1 ) immediately if experience any warning signs of stroke. 4. Be sure and schedule a follow-up appointment with your primary care doctor or any specialists as instructed. 5. You must take medicine every day to treat your risk factors for stroke. Be sure to take your medicines exactly as your doctor tells you: no more, no less. Know what your medicines are for , what they do. Anti-thrombotics /anticoagulants can help prevent strokes. You are taking the following medicine(s)  Metoprolol, lisinopril     6. Smoking and second-hand smoke greatly increase your risk of stroke, cardiovascular disease and death. Smoking history never    7. Information provided was Stroke Handouts or Verbal Education    8.  Documentation of teaching completed in Patient Education Activity and on Care Plan with teaching response noted?  yes      [facility], RN  06/07/23 1983

## 2023-06-07 NOTE — CONSULTS
Ar Automatic Reconciliation   metFORMIN (GLUCOPHAGE-XR) 500 MG extended release tablet TAKE 1 TABLET EVERY DAY WITH DINNER 2/27/23   Ar Automatic Reconciliation   metoprolol succinate (TOPROL XL) 100 MG extended release tablet Take 1 tablet by mouth daily 1/31/23   Ar Automatic Reconciliation   mirabegron (MYRBETRIQ) 50 MG TB24 Take 50 mg by mouth daily    Ar Automatic Reconciliation   nitroGLYCERIN (NITROSTAT) 0.4 MG SL tablet Place 1 tablet under the tongue    Ar Automatic Reconciliation   pantoprazole (PROTONIX) 40 MG tablet Take 1 tablet by mouth daily 2/27/23   Ar Automatic Reconciliation   tamsulosin (FLOMAX) 0.4 MG capsule Take 1 capsule by mouth daily    Ar Automatic Reconciliation     No Known Allergies     Review of Systems   Constitutional:  Negative for fatigue and fever. Respiratory:  Negative for shortness of breath. Cardiovascular:  Negative for chest pain and leg swelling. Gastrointestinal:  Negative for abdominal pain. Skin:  Negative for color change and wound. Neurological:  Negative for dizziness and speech difficulty.      Objective:     Patient Vitals for the past 24 hrs:   BP Temp Temp src Pulse Resp SpO2 Height Weight   06/07/23 1221 -- -- -- 86 -- -- -- --   06/07/23 1213 129/81 97 °F (36.1 °C) Axillary 84 18 99 % -- --   06/07/23 1152 (!) 136/106 -- -- 69 -- -- 1.803 m (5' 11\") 97.5 kg (215 lb)   06/07/23 0744 133/70 97.5 °F (36.4 °C) Oral 66 15 96 % -- --   06/07/23 0700 -- -- -- 64 -- -- -- --   06/07/23 0400 116/60 -- -- 65 18 91 % -- --   06/06/23 2326 -- -- -- 68 -- -- -- --   06/06/23 2014 -- -- -- 79 -- -- -- --   06/06/23 2009 (!) 148/74 98 °F (36.7 °C) Oral 74 18 98 % 1.803 m (5' 11\") 97.5 kg (215 lb)   06/06/23 1845 119/66 -- -- 80 -- 91 % -- --   06/06/23 1815 137/78 98.4 °F (36.9 °C) Oral 76 19 94 % -- --   06/06/23 1745 (!) 140/78 -- -- 76 18 -- -- --   06/06/23 1724 -- -- -- 80 18 92 % -- --   06/06/23 1709 -- -- -- 77 20 93 % -- --   06/06/23 1654 -- -- -- 73 20
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
## 2023-06-08 LAB
BACTERIA SPEC CULT: NORMAL
ECHO BSA: 2.21 M2
EKG ATRIAL RATE: 72 BPM
EKG DIAGNOSIS: NORMAL
EKG P AXIS: 57 DEGREES
EKG P-R INTERVAL: 286 MS
EKG Q-T INTERVAL: 436 MS
EKG QRS DURATION: 152 MS
EKG QTC CALCULATION (BAZETT): 477 MS
EKG R AXIS: -70 DEGREES
EKG T AXIS: 28 DEGREES
EKG VENTRICULAR RATE: 72 BPM
SERVICE CMNT-IMP: NORMAL
VAS LEFT CCA DIST EDV: 11.3 CM/S
VAS LEFT CCA DIST PSV: 65.3 CM/S
VAS LEFT CCA PROX EDV: 8.9 CM/S
VAS LEFT CCA PROX PSV: 65.9 CM/S
VAS LEFT ECA EDV: 0 CM/S
VAS LEFT ECA PSV: 69.7 CM/S
VAS LEFT ICA DIST EDV: 11.2 CM/S
VAS LEFT ICA DIST PSV: 61.7 CM/S
VAS LEFT ICA MID EDV: 15.6 CM/S
VAS LEFT ICA MID PSV: 67.1 CM/S
VAS LEFT ICA PROX EDV: 11.3 CM/S
VAS LEFT ICA PROX PSV: 55.7 CM/S
VAS LEFT ICA/CCA PSV: 1.03 NO UNITS
VAS LEFT SUBCLAVIAN PROX EDV: 0 CM/S
VAS LEFT SUBCLAVIAN PROX PSV: 100.8 CM/S
VAS LEFT VERTEBRAL EDV: 10.03 CM/S
VAS LEFT VERTEBRAL PSV: 35.8 CM/S
VAS RIGHT CCA DIST EDV: 8.1 CM/S
VAS RIGHT CCA DIST PSV: 52.2 CM/S
VAS RIGHT CCA PROX EDV: 5.4 CM/S
VAS RIGHT CCA PROX PSV: 35 CM/S
VAS RIGHT ECA EDV: 4.27 CM/S
VAS RIGHT ECA PSV: 65.3 CM/S
VAS RIGHT ICA DIST EDV: 12.1 CM/S
VAS RIGHT ICA DIST PSV: 51.4 CM/S
VAS RIGHT ICA MID EDV: 11.3 CM/S
VAS RIGHT ICA MID PSV: 50.5 CM/S
VAS RIGHT ICA PROX EDV: 13.9 CM/S
VAS RIGHT ICA PROX PSV: 73.2 CM/S
VAS RIGHT ICA/CCA PSV: 1.4 NO UNITS
VAS RIGHT SUBCLAVIAN PROX EDV: 0 CM/S
VAS RIGHT SUBCLAVIAN PROX PSV: 62.5 CM/S
VAS RIGHT VERTEBRAL EDV: 0 CM/S
VAS RIGHT VERTEBRAL PSV: 32.3 CM/S

## 2023-06-08 PROCEDURE — 93010 ELECTROCARDIOGRAM REPORT: CPT | Performed by: STUDENT IN AN ORGANIZED HEALTH CARE EDUCATION/TRAINING PROGRAM

## 2023-06-12 PROBLEM — N30.01 ACUTE CYSTITIS WITH HEMATURIA: Status: ACTIVE | Noted: 2023-06-12

## 2023-06-12 PROBLEM — E11.9 DIABETES MELLITUS (HCC): Status: ACTIVE | Noted: 2021-09-01

## 2023-06-12 PROBLEM — I63.81 LACUNAR INFARCTION (HCC): Status: ACTIVE | Noted: 2023-06-12

## 2023-06-12 PROBLEM — I67.1 INTRACRANIAL ANEURYSM: Status: ACTIVE | Noted: 2023-06-12

## 2023-06-14 ENCOUNTER — OFFICE VISIT (OUTPATIENT)
Age: 88
End: 2023-06-14

## 2023-06-14 VITALS
OXYGEN SATURATION: 96 % | SYSTOLIC BLOOD PRESSURE: 144 MMHG | TEMPERATURE: 97.7 F | BODY MASS INDEX: 30.09 KG/M2 | DIASTOLIC BLOOD PRESSURE: 82 MMHG | HEIGHT: 71 IN | WEIGHT: 214.9 LBS | HEART RATE: 65 BPM

## 2023-06-14 DIAGNOSIS — G45.9 TIA (TRANSIENT ISCHEMIC ATTACK): Primary | ICD-10-CM

## 2023-06-14 DIAGNOSIS — E78.5 DYSLIPIDEMIA: ICD-10-CM

## 2023-06-14 DIAGNOSIS — I10 PRIMARY HYPERTENSION: ICD-10-CM

## 2023-06-14 DIAGNOSIS — Z09 HOSPITAL DISCHARGE FOLLOW-UP: ICD-10-CM

## 2023-06-14 DIAGNOSIS — Z02.89 ENCOUNTER FOR COMPLETION OF FORM WITH PATIENT: ICD-10-CM

## 2023-06-14 DIAGNOSIS — I25.10 CORONARY ARTERY DISEASE INVOLVING NATIVE CORONARY ARTERY OF NATIVE HEART WITHOUT ANGINA PECTORIS: ICD-10-CM

## 2023-06-14 SDOH — ECONOMIC STABILITY: FOOD INSECURITY: WITHIN THE PAST 12 MONTHS, THE FOOD YOU BOUGHT JUST DIDN'T LAST AND YOU DIDN'T HAVE MONEY TO GET MORE.: NEVER TRUE

## 2023-06-14 SDOH — ECONOMIC STABILITY: FOOD INSECURITY: WITHIN THE PAST 12 MONTHS, YOU WORRIED THAT YOUR FOOD WOULD RUN OUT BEFORE YOU GOT MONEY TO BUY MORE.: NEVER TRUE

## 2023-06-14 SDOH — ECONOMIC STABILITY: INCOME INSECURITY: HOW HARD IS IT FOR YOU TO PAY FOR THE VERY BASICS LIKE FOOD, HOUSING, MEDICAL CARE, AND HEATING?: NOT HARD AT ALL

## 2023-06-14 ASSESSMENT — PATIENT HEALTH QUESTIONNAIRE - PHQ9
SUM OF ALL RESPONSES TO PHQ QUESTIONS 1-9: 0
2. FEELING DOWN, DEPRESSED OR HOPELESS: 0
1. LITTLE INTEREST OR PLEASURE IN DOING THINGS: 0
SUM OF ALL RESPONSES TO PHQ QUESTIONS 1-9: 0
SUM OF ALL RESPONSES TO PHQ9 QUESTIONS 1 & 2: 0
SUM OF ALL RESPONSES TO PHQ QUESTIONS 1-9: 0
SUM OF ALL RESPONSES TO PHQ QUESTIONS 1-9: 0

## 2023-07-01 RX ORDER — ATORVASTATIN CALCIUM 80 MG/1
TABLET, FILM COATED ORAL
Qty: 90 TABLET | Refills: 1 | Status: SHIPPED | OUTPATIENT
Start: 2023-07-01

## 2023-07-10 RX ORDER — ATORVASTATIN CALCIUM 80 MG/1
80 TABLET, FILM COATED ORAL NIGHTLY
Qty: 90 TABLET | Refills: 1 | Status: SHIPPED | OUTPATIENT
Start: 2023-07-10

## 2023-07-24 RX ORDER — LISINOPRIL 20 MG/1
TABLET ORAL
Qty: 90 TABLET | Refills: 1 | Status: SHIPPED | OUTPATIENT
Start: 2023-07-24

## 2023-07-26 ENCOUNTER — OFFICE VISIT (OUTPATIENT)
Age: 88
End: 2023-07-26
Payer: MEDICARE

## 2023-07-26 ENCOUNTER — TELEPHONE (OUTPATIENT)
Age: 88
End: 2023-07-26

## 2023-07-26 VITALS
HEART RATE: 60 BPM | SYSTOLIC BLOOD PRESSURE: 130 MMHG | WEIGHT: 213 LBS | BODY MASS INDEX: 29.82 KG/M2 | OXYGEN SATURATION: 95 % | DIASTOLIC BLOOD PRESSURE: 70 MMHG | HEIGHT: 71 IN

## 2023-07-26 DIAGNOSIS — I10 PRIMARY HYPERTENSION: ICD-10-CM

## 2023-07-26 DIAGNOSIS — E78.2 MIXED HYPERLIPIDEMIA: ICD-10-CM

## 2023-07-26 DIAGNOSIS — I25.118 CORONARY ARTERY DISEASE OF NATIVE ARTERY OF NATIVE HEART WITH STABLE ANGINA PECTORIS (HCC): Primary | ICD-10-CM

## 2023-07-26 DIAGNOSIS — G45.9 TIA (TRANSIENT ISCHEMIC ATTACK): ICD-10-CM

## 2023-07-26 PROCEDURE — 1036F TOBACCO NON-USER: CPT | Performed by: SPECIALIST

## 2023-07-26 PROCEDURE — G8427 DOCREV CUR MEDS BY ELIG CLIN: HCPCS | Performed by: SPECIALIST

## 2023-07-26 PROCEDURE — 1123F ACP DISCUSS/DSCN MKR DOCD: CPT | Performed by: SPECIALIST

## 2023-07-26 PROCEDURE — G8419 CALC BMI OUT NRM PARAM NOF/U: HCPCS | Performed by: SPECIALIST

## 2023-07-26 PROCEDURE — 99215 OFFICE O/P EST HI 40 MIN: CPT | Performed by: SPECIALIST
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
## 2023-07-26 NOTE — TELEPHONE ENCOUNTER
----- Message from Levi Aaron MD sent at 2023  9:29 AM EDT -----  Regardin week event montior  Can you please send cristian herrera 2 week event monitor for TIA     Thanks  SK

## 2023-11-15 RX ORDER — METOPROLOL SUCCINATE 100 MG/1
100 TABLET, EXTENDED RELEASE ORAL DAILY
Qty: 90 TABLET | Refills: 1 | Status: SHIPPED | OUTPATIENT
Start: 2023-11-15

## 2023-11-15 NOTE — TELEPHONE ENCOUNTER
Refill per VO of Dr. Gokul Howard  Last appt: 7/26/2023    Future Appointments   Date Time Provider [address] [place] [address]   1/30/2024  9:20 AM MD LAYNE Brown       Requested Prescriptions     Pending Prescriptions Disp Refills    metoprolol succinate (TOPROL XL) 100 MG extended release tablet [Pharmacy Med Name: METOPROLOL SUCCINATE  MG Tablet Extended Release 24 Hour] 90 tablet 10     Sig: TAKE 1 TABLET EVERY DAY   90 day supply sent to mail order pharmacy.

## 2024-01-29 RX ORDER — ATORVASTATIN CALCIUM 80 MG/1
80 TABLET, FILM COATED ORAL NIGHTLY
Qty: 90 TABLET | Refills: 0 | Status: SHIPPED | OUTPATIENT
Start: 2024-01-29

## 2024-01-30 ENCOUNTER — OFFICE VISIT (OUTPATIENT)
Age: 89
End: 2024-01-30
Payer: MEDICARE

## 2024-01-30 ENCOUNTER — TELEPHONE (OUTPATIENT)
Age: 89
End: 2024-01-30

## 2024-01-30 VITALS
SYSTOLIC BLOOD PRESSURE: 128 MMHG | HEIGHT: 71 IN | BODY MASS INDEX: 30.1 KG/M2 | HEART RATE: 67 BPM | OXYGEN SATURATION: 96 % | DIASTOLIC BLOOD PRESSURE: 74 MMHG | WEIGHT: 215 LBS

## 2024-01-30 DIAGNOSIS — I25.118 CORONARY ARTERY DISEASE OF NATIVE ARTERY OF NATIVE HEART WITH STABLE ANGINA PECTORIS (HCC): Primary | ICD-10-CM

## 2024-01-30 DIAGNOSIS — G45.9 TIA (TRANSIENT ISCHEMIC ATTACK): ICD-10-CM

## 2024-01-30 DIAGNOSIS — E11.9 TYPE 2 DIABETES MELLITUS WITHOUT COMPLICATION, WITHOUT LONG-TERM CURRENT USE OF INSULIN (HCC): ICD-10-CM

## 2024-01-30 PROCEDURE — G8484 FLU IMMUNIZE NO ADMIN: HCPCS | Performed by: SPECIALIST

## 2024-01-30 PROCEDURE — G8419 CALC BMI OUT NRM PARAM NOF/U: HCPCS | Performed by: SPECIALIST

## 2024-01-30 PROCEDURE — 99214 OFFICE O/P EST MOD 30 MIN: CPT | Performed by: SPECIALIST

## 2024-01-30 PROCEDURE — G8427 DOCREV CUR MEDS BY ELIG CLIN: HCPCS | Performed by: SPECIALIST

## 2024-01-30 PROCEDURE — 1036F TOBACCO NON-USER: CPT | Performed by: SPECIALIST

## 2024-01-30 PROCEDURE — 1123F ACP DISCUSS/DSCN MKR DOCD: CPT | Performed by: SPECIALIST
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
## 2024-01-30 NOTE — TELEPHONE ENCOUNTER
Enrolled with [employer] - Ordered and being shipped to patient's home address on file.  ETA within 5-7 business days.        2 week event montior  Received: Today  Bailey Jian MD Baker, Jami  Can you please send him a 2 week event monitor for TIA.      We ordered one in July 2023 but he says he never got it ?    Thanks  SK

## 2024-01-30 NOTE — PROGRESS NOTES
Chief Complaint   Patient presents with    Follow-up     6 months    Coronary Artery Disease    Hypertension    Other     TIA     Vitals:    01/30/24 0927   BP: 128/74   Site: Left Upper Arm   Position: Sitting   Cuff Size: Large Adult   Pulse: 67   SpO2: 96%   Weight: 97.5 kg (215 lb)   Height: 1.803 m (5' 11\")       Chest pain NO     ER, urgent care, or hospitalized outside of [facility] since your last visit?  NO     Refills nitro

## 2024-02-26 RX ORDER — LISINOPRIL 20 MG/1
TABLET ORAL
Qty: 90 TABLET | Refills: 3 | OUTPATIENT
Start: 2024-02-26

## 2024-04-09 ENCOUNTER — HOSPITAL ENCOUNTER (OUTPATIENT)
Facility: HOSPITAL | Age: 89
Discharge: HOME | End: 2024-04-12
Payer: MEDICARE

## 2024-04-09 ENCOUNTER — OFFICE VISIT (OUTPATIENT)
Age: 89
End: 2024-04-09
Payer: MEDICARE

## 2024-04-09 VITALS
HEART RATE: 59 BPM | BODY MASS INDEX: 30.94 KG/M2 | TEMPERATURE: 97.9 F | WEIGHT: 221 LBS | RESPIRATION RATE: 20 BRPM | HEIGHT: 71 IN | SYSTOLIC BLOOD PRESSURE: 159 MMHG | OXYGEN SATURATION: 98 % | DIASTOLIC BLOOD PRESSURE: 67 MMHG

## 2024-04-09 DIAGNOSIS — M25.522 LEFT ELBOW PAIN: Primary | ICD-10-CM

## 2024-04-09 DIAGNOSIS — M25.522 LEFT ELBOW PAIN: ICD-10-CM

## 2024-04-09 PROCEDURE — G8417 CALC BMI ABV UP PARAM F/U: HCPCS | Performed by: PHYSICIAN ASSISTANT

## 2024-04-09 PROCEDURE — 99213 OFFICE O/P EST LOW 20 MIN: CPT | Performed by: PHYSICIAN ASSISTANT

## 2024-04-09 PROCEDURE — 1123F ACP DISCUSS/DSCN MKR DOCD: CPT | Performed by: PHYSICIAN ASSISTANT

## 2024-04-09 PROCEDURE — 1036F TOBACCO NON-USER: CPT | Performed by: PHYSICIAN ASSISTANT

## 2024-04-09 PROCEDURE — 73080 X-RAY EXAM OF ELBOW: CPT

## 2024-04-09 PROCEDURE — G8427 DOCREV CUR MEDS BY ELIG CLIN: HCPCS | Performed by: PHYSICIAN ASSISTANT

## 2024-04-09 ASSESSMENT — PATIENT HEALTH QUESTIONNAIRE - PHQ9
SUM OF ALL RESPONSES TO PHQ9 QUESTIONS 1 & 2: 0
1. LITTLE INTEREST OR PLEASURE IN DOING THINGS: NOT AT ALL
2. FEELING DOWN, DEPRESSED OR HOPELESS: NOT AT ALL
SUM OF ALL RESPONSES TO PHQ QUESTIONS 1-9: 0

## 2024-04-09 NOTE — PROGRESS NOTES
Gregory Pablo is a 88 y.o. male , id x 2(name and ). Reviewed record, history, and  medications.      Chief Complaint   Patient presents with    Arm Pain     Patient c/o loss of use of the left arm. Last pain in left arm about 1 week ago, patient states a twinge in the elbow at times, the arm is good through the night, but unable to use the left arm during the day. Patient has been unable to do normal daily tasks d/t left arm.         Vitals:    24 1116   Weight: 100.2 kg (221 lb)   Height: 1.803 m (5' 11\")               2024    11:21 AM   PHQ-9    Little interest or pleasure in doing things 0   Feeling down, depressed, or hopeless 0   PHQ-2 Score 0   PHQ-9 Total Score 0            No data to display                Social Determinants of Health     Tobacco Use: Low Risk  (2024)    Patient History     Smoking Tobacco Use: Never     Smokeless Tobacco Use: Never     Passive Exposure: Past   Alcohol Use: Not At Risk (2023)    AUDIT-C     Frequency of Alcohol Consumption: Never     Average Number of Drinks: Patient does not drink     Frequency of Binge Drinking: Never   Financial Resource Strain: Low Risk  (2023)    Overall Financial Resource Strain (CARDIA)     Difficulty of Paying Living Expenses: Not hard at all   Food Insecurity: Not on file (2023)   Transportation Needs: Unknown (2023)    PRAPARE - Transportation     Lack of Transportation (Medical): Not on file     Lack of Transportation (Non-Medical): No   Physical Activity: Not on file   Stress: Not on file   Social Connections: Not on file   Intimate Partner Violence: Not on file   Depression: Not at risk (2023)    PHQ-2     PHQ-2 Score: 0   Housing Stability: Unknown (2023)    Housing Stability Vital Sign     Unable to Pay for Housing in the Last Year: Not on file     Number of Places Lived in the Last Year: Not on file     Unstable Housing in the Last Year: No   Interpersonal Safety: Not At Risk (2023)    
reviewed and or requested: Yes  Patient Past Records were reviewed and or requested  Yes    Aide Webster PA-C                        Click Here for Release of Records Request

## 2024-04-11 DIAGNOSIS — E78.5 DYSLIPIDEMIA: ICD-10-CM

## 2024-04-11 DIAGNOSIS — I10 PRIMARY HYPERTENSION: Primary | ICD-10-CM

## 2024-04-11 DIAGNOSIS — K21.9 GASTROESOPHAGEAL REFLUX DISEASE, UNSPECIFIED WHETHER ESOPHAGITIS PRESENT: ICD-10-CM

## 2024-04-11 RX ORDER — METOPROLOL SUCCINATE 100 MG/1
100 TABLET, EXTENDED RELEASE ORAL DAILY
Qty: 90 TABLET | Refills: 3 | Status: SHIPPED | OUTPATIENT
Start: 2024-04-11

## 2024-04-11 NOTE — TELEPHONE ENCOUNTER
Refill per VO of Dr. Jain  Last appt: 1/30/2024    Future Appointments   Date Time Provider Department Center   7/30/2024  9:40 AM Bailey Jain MD [facility]       Requested Prescriptions     Signed Prescriptions Disp Refills    metoprolol succinate (TOPROL XL) 100 MG extended release tablet 90 tablet 3     Sig: TAKE 1 TABLET EVERY DAY     Authorizing Provider: BAILEY JAIN     Ordering User: ANAHI SCRUGGS

## 2024-04-12 RX ORDER — ATORVASTATIN CALCIUM 80 MG/1
TABLET, FILM COATED ORAL
Qty: 90 TABLET | Refills: 3 | OUTPATIENT
Start: 2024-04-12

## 2024-04-12 RX ORDER — PANTOPRAZOLE SODIUM 40 MG/1
40 TABLET, DELAYED RELEASE ORAL DAILY
Qty: 90 TABLET | Refills: 3 | OUTPATIENT
Start: 2024-04-12

## 2024-04-12 RX ORDER — AMLODIPINE BESYLATE 10 MG/1
10 TABLET ORAL DAILY
Qty: 90 TABLET | Refills: 3 | OUTPATIENT
Start: 2024-04-12

## 2024-04-14 RX ORDER — ATORVASTATIN CALCIUM 80 MG/1
80 TABLET, FILM COATED ORAL NIGHTLY
Qty: 60 TABLET | Refills: 0 | Status: SHIPPED | OUTPATIENT
Start: 2024-04-14

## 2024-04-14 RX ORDER — AMLODIPINE BESYLATE 10 MG/1
10 TABLET ORAL DAILY
Qty: 60 TABLET | Refills: 0 | Status: SHIPPED | OUTPATIENT
Start: 2024-04-14

## 2024-04-14 RX ORDER — PANTOPRAZOLE SODIUM 40 MG/1
40 TABLET, DELAYED RELEASE ORAL DAILY
Qty: 60 TABLET | Refills: 0 | Status: SHIPPED | OUTPATIENT
Start: 2024-04-14

## 2024-05-28 ENCOUNTER — TELEMEDICINE (OUTPATIENT)
Age: 89
End: 2024-05-28
Payer: MEDICARE

## 2024-05-28 DIAGNOSIS — I10 PRIMARY HYPERTENSION: ICD-10-CM

## 2024-05-28 DIAGNOSIS — K21.9 GASTROESOPHAGEAL REFLUX DISEASE, UNSPECIFIED WHETHER ESOPHAGITIS PRESENT: ICD-10-CM

## 2024-05-28 DIAGNOSIS — M25.522 LEFT ELBOW PAIN: ICD-10-CM

## 2024-05-28 DIAGNOSIS — E78.5 DYSLIPIDEMIA: ICD-10-CM

## 2024-05-28 DIAGNOSIS — E11.69 TYPE 2 DIABETES MELLITUS WITH OTHER SPECIFIED COMPLICATION, WITHOUT LONG-TERM CURRENT USE OF INSULIN (HCC): Primary | ICD-10-CM

## 2024-05-28 PROCEDURE — 99214 OFFICE O/P EST MOD 30 MIN: CPT | Performed by: STUDENT IN AN ORGANIZED HEALTH CARE EDUCATION/TRAINING PROGRAM

## 2024-05-28 PROCEDURE — 1123F ACP DISCUSS/DSCN MKR DOCD: CPT | Performed by: STUDENT IN AN ORGANIZED HEALTH CARE EDUCATION/TRAINING PROGRAM

## 2024-05-28 PROCEDURE — G8427 DOCREV CUR MEDS BY ELIG CLIN: HCPCS | Performed by: STUDENT IN AN ORGANIZED HEALTH CARE EDUCATION/TRAINING PROGRAM

## 2024-05-28 RX ORDER — LISINOPRIL 20 MG/1
20 TABLET ORAL DAILY
Qty: 90 TABLET | Refills: 1 | Status: SHIPPED | OUTPATIENT
Start: 2024-05-28

## 2024-05-28 RX ORDER — METFORMIN HYDROCHLORIDE 500 MG/1
TABLET, EXTENDED RELEASE ORAL
Qty: 90 TABLET | Refills: 1 | Status: SHIPPED | OUTPATIENT
Start: 2024-05-28

## 2024-05-28 RX ORDER — ATORVASTATIN CALCIUM 80 MG/1
80 TABLET, FILM COATED ORAL NIGHTLY
Qty: 90 TABLET | Refills: 1 | Status: SHIPPED | OUTPATIENT
Start: 2024-05-28

## 2024-05-28 RX ORDER — AMLODIPINE BESYLATE 10 MG/1
10 TABLET ORAL DAILY
Qty: 90 TABLET | Refills: 1 | Status: SHIPPED | OUTPATIENT
Start: 2024-05-28

## 2024-05-28 RX ORDER — PANTOPRAZOLE SODIUM 40 MG/1
40 TABLET, DELAYED RELEASE ORAL DAILY
Qty: 90 TABLET | Refills: 3 | Status: SHIPPED | OUTPATIENT
Start: 2024-05-28

## 2024-05-28 ASSESSMENT — PATIENT HEALTH QUESTIONNAIRE - PHQ9
SUM OF ALL RESPONSES TO PHQ QUESTIONS 1-9: 0
SUM OF ALL RESPONSES TO PHQ QUESTIONS 1-9: 0
SUM OF ALL RESPONSES TO PHQ9 QUESTIONS 1 & 2: 0
1. LITTLE INTEREST OR PLEASURE IN DOING THINGS: NOT AT ALL
2. FEELING DOWN, DEPRESSED OR HOPELESS: NOT AT ALL
SUM OF ALL RESPONSES TO PHQ QUESTIONS 1-9: 0
SUM OF ALL RESPONSES TO PHQ QUESTIONS 1-9: 0

## 2024-05-28 NOTE — PATIENT INSTRUCTIONS
Call to schedule with the hand specialist:  Referred To: Susu Neil McDermott  [address]  [address]  [place] [facility] [place] [facility] [address]   Phone: [phone]   Fax: [phone]     Topical over the counter medications:  - lidocaine - numbing  - diclofenac/voltaren - anti-inflammatory  - menthol - cooling  - capsacin - hot component of peppers  - other: emu oil, two old goats, CBD  - ice and heat

## 2024-05-28 NOTE — PROGRESS NOTES
Chief Complaint   Patient presents with    Medication Refill       Patient gave verbal consent today for PRIYANK.      \"Have you been to the ER, urgent care clinic since your last visit?  Hospitalized since your last visit?\"    NO    “Have you seen or consulted any other health care providers outside of [facility] since your last visit?”    NO            Click Here for Release of Records Request

## 2024-05-28 NOTE — PROGRESS NOTES
Progress Note    he is a 89 y.o. year old male who presents for evaluation Medication Refill        Assessment/ Plan:     Assessment & Plan  1. Diabetes Mellitus.  The patient's metformin prescription has been renewed. Additionally, a lab order will be faxed to [facility], which he is advised to complete within the next 1 to 2 days.    2. Pain in the left elbow.  A referral to an orthopedic specialist at [facility] will be made. In the interim, a list of over-the-counter topical medications will be provided.       1. Type 2 diabetes mellitus with other specified complication, without long-term current use of insulin (HCC)  -     metFORMIN (GLUCOPHAGE-XR) 500 MG extended release tablet; TAKE 1 TABLET EVERY DAY WITH DINNER, Disp-90 tablet, R-1Normal  -     Hemoglobin A1C; Future  2. Primary hypertension  -     amLODIPine (NORVASC) 10 MG tablet; Take 1 tablet by mouth daily, Disp-90 tablet, R-1Normal  -     lisinopril (PRINIVIL;ZESTRIL) 20 MG tablet; Take 1 tablet by mouth daily, Disp-90 tablet, R-1Normal  3. Dyslipidemia  -     atorvastatin (LIPITOR) 80 MG tablet; Take 1 tablet by mouth nightly, Disp-90 tablet, R-1Normal  -     CBC; Future  -     Comprehensive Metabolic Panel; Future  -     Lipid Panel; Future  -     TSH; Future  4. Gastroesophageal reflux disease, unspecified whether esophagitis present  -     pantoprazole (PROTONIX) 40 MG tablet; Take 1 tablet by mouth daily, Disp-90 tablet, R-3Normal  5. Left elbow pain  -     [facility] - Addison Mohamud MD, Orthopedic Surgery (elbow, hand, wrist), [place]         Return in about 6 months (around 11/28/2024) for follow-up .      I have discussed the diagnosis with the patient and the intended plan as seen in the above orders.    Medication Side Effects and Warnings were discussed with patient  The patient was instructed to access after-visit summary via [facility] and questions were answered concerning future plans.    Patient conveyed understanding of

## 2024-05-29 LAB
ALBUMIN SERPL-MCNC: 4.5 G/DL (ref 3.7–4.7)
ALBUMIN/GLOB SERPL: 2.1 {RATIO} (ref 1.2–2.2)
ALP SERPL-CCNC: 100 IU/L (ref 44–121)
ALT SERPL-CCNC: 20 IU/L (ref 0–44)
AST SERPL-CCNC: 23 IU/L (ref 0–40)
BILIRUB SERPL-MCNC: 0.7 MG/DL (ref 0–1.2)
BUN SERPL-MCNC: 21 MG/DL (ref 8–27)
BUN/CREAT SERPL: 27 (ref 10–24)
CALCIUM SERPL-MCNC: 9.2 MG/DL (ref 8.6–10.2)
CHLORIDE SERPL-SCNC: 100 MMOL/L (ref 96–106)
CHOLEST SERPL-MCNC: 110 MG/DL (ref 100–199)
CO2 SERPL-SCNC: 23 MMOL/L (ref 20–29)
CREAT SERPL-MCNC: 0.77 MG/DL (ref 0.76–1.27)
EGFRCR SERPLBLD CKD-EPI 2021: 86 ML/MIN/1.73
ERYTHROCYTE [DISTWIDTH] IN BLOOD BY AUTOMATED COUNT: 12.9 % (ref 11.6–15.4)
GLOBULIN SER CALC-MCNC: 2.1 G/DL (ref 1.5–4.5)
GLUCOSE SERPL-MCNC: 170 MG/DL (ref 70–99)
HBA1C MFR BLD: 7 % (ref 4.8–5.6)
HCT VFR BLD AUTO: 45.8 % (ref 37.5–51)
HDLC SERPL-MCNC: 44 MG/DL
HGB BLD-MCNC: 14.7 G/DL (ref 13–17.7)
IMP & REVIEW OF LAB RESULTS: NORMAL
LDLC SERPL CALC-MCNC: 44 MG/DL (ref 0–99)
Lab: NORMAL
MCH RBC QN AUTO: 28.4 PG (ref 26.6–33)
MCHC RBC AUTO-ENTMCNC: 32.1 G/DL (ref 31.5–35.7)
MCV RBC AUTO: 88 FL (ref 79–97)
PLATELET # BLD AUTO: 173 X10E3/UL (ref 150–450)
POTASSIUM SERPL-SCNC: 4.4 MMOL/L (ref 3.5–5.2)
PROT SERPL-MCNC: 6.6 G/DL (ref 6–8.5)
RBC # BLD AUTO: 5.18 X10E6/UL (ref 4.14–5.8)
SODIUM SERPL-SCNC: 140 MMOL/L (ref 134–144)
TRIGL SERPL-MCNC: 123 MG/DL (ref 0–149)
TSH SERPL DL<=0.005 MIU/L-ACNC: 0.64 UIU/ML (ref 0.45–4.5)
VLDLC SERPL CALC-MCNC: 22 MG/DL (ref 5–40)
WBC # BLD AUTO: 5 X10E3/UL (ref 3.4–10.8)

## 2024-05-30 ENCOUNTER — PATIENT MESSAGE (OUTPATIENT)
Age: 89
End: 2024-05-30

## 2024-05-30 DIAGNOSIS — E11.69 TYPE 2 DIABETES MELLITUS WITH OTHER SPECIFIED COMPLICATION, WITHOUT LONG-TERM CURRENT USE OF INSULIN (HCC): ICD-10-CM

## 2024-06-03 RX ORDER — METFORMIN HYDROCHLORIDE 500 MG/1
1000 TABLET, EXTENDED RELEASE ORAL DAILY
Qty: 180 TABLET | Refills: 1 | Status: SHIPPED | OUTPATIENT
Start: 2024-06-03

## 2024-06-07 DIAGNOSIS — I10 PRIMARY HYPERTENSION: ICD-10-CM

## 2024-06-07 DIAGNOSIS — E78.5 DYSLIPIDEMIA: ICD-10-CM

## 2024-06-07 RX ORDER — AMLODIPINE BESYLATE 10 MG/1
TABLET ORAL
Qty: 60 TABLET | Refills: 5 | Status: SHIPPED | OUTPATIENT
Start: 2024-06-07

## 2024-06-07 RX ORDER — ATORVASTATIN CALCIUM 80 MG/1
TABLET, FILM COATED ORAL
Qty: 60 TABLET | Refills: 5 | Status: SHIPPED | OUTPATIENT
Start: 2024-06-07

## 2024-06-17 DIAGNOSIS — E11.69 TYPE 2 DIABETES MELLITUS WITH OTHER SPECIFIED COMPLICATION, WITHOUT LONG-TERM CURRENT USE OF INSULIN (HCC): ICD-10-CM

## 2024-06-17 RX ORDER — METFORMIN HYDROCHLORIDE 500 MG/1
1000 TABLET, EXTENDED RELEASE ORAL DAILY
Qty: 180 TABLET | Refills: 1 | Status: SHIPPED | OUTPATIENT
Start: 2024-06-17

## 2024-07-30 ENCOUNTER — OFFICE VISIT (OUTPATIENT)
Age: 89
End: 2024-07-30
Payer: MEDICARE

## 2024-07-30 VITALS
WEIGHT: 215.2 LBS | HEART RATE: 72 BPM | BODY MASS INDEX: 30.13 KG/M2 | HEIGHT: 71 IN | DIASTOLIC BLOOD PRESSURE: 80 MMHG | OXYGEN SATURATION: 96 % | SYSTOLIC BLOOD PRESSURE: 128 MMHG

## 2024-07-30 DIAGNOSIS — E11.69 TYPE 2 DIABETES MELLITUS WITH OTHER SPECIFIED COMPLICATION, WITHOUT LONG-TERM CURRENT USE OF INSULIN (HCC): ICD-10-CM

## 2024-07-30 DIAGNOSIS — G45.9 TIA (TRANSIENT ISCHEMIC ATTACK): ICD-10-CM

## 2024-07-30 DIAGNOSIS — E78.2 MIXED HYPERLIPIDEMIA: ICD-10-CM

## 2024-07-30 DIAGNOSIS — I10 PRIMARY HYPERTENSION: Primary | ICD-10-CM

## 2024-07-30 DIAGNOSIS — I25.118 CORONARY ARTERY DISEASE OF NATIVE ARTERY OF NATIVE HEART WITH STABLE ANGINA PECTORIS (HCC): ICD-10-CM

## 2024-07-30 PROCEDURE — 99214 OFFICE O/P EST MOD 30 MIN: CPT | Performed by: SPECIALIST

## 2024-07-30 PROCEDURE — G8417 CALC BMI ABV UP PARAM F/U: HCPCS | Performed by: SPECIALIST

## 2024-07-30 PROCEDURE — 93005 ELECTROCARDIOGRAM TRACING: CPT | Performed by: SPECIALIST

## 2024-07-30 PROCEDURE — 93010 ELECTROCARDIOGRAM REPORT: CPT | Performed by: SPECIALIST

## 2024-07-30 PROCEDURE — 1123F ACP DISCUSS/DSCN MKR DOCD: CPT | Performed by: SPECIALIST

## 2024-07-30 PROCEDURE — 1036F TOBACCO NON-USER: CPT | Performed by: SPECIALIST

## 2024-07-30 PROCEDURE — G8427 DOCREV CUR MEDS BY ELIG CLIN: HCPCS | Performed by: SPECIALIST

## 2024-07-30 PROCEDURE — 3051F HG A1C>EQUAL 7.0%<8.0%: CPT | Performed by: SPECIALIST

## 2024-07-30 NOTE — PROGRESS NOTES
Chief Complaint   Patient presents with    Coronary Artery Disease    Transient Ischemic Attack     Vitals:    07/30/24 0938   BP: 128/80   Site: Left Upper Arm   Position: Sitting   Pulse: 72   SpO2: 96%   Weight: 97.6 kg (215 lb 3.2 oz)   Height: 1.803 m (5' 11\")         Chest pain: DENIED     Recent hospital stays: DENIED     Refills: DENIED   
neuro  - Continue lipitor potent dose   - doing well since then     7) BREANN on CPAP     8) See me back in 6 months (his preference)      Wife passed in May 2021 (ESRD).  Retired builder.  Moved back to [place] in 2021 to be near family.  Built new house in 2023 - lives with daughter.       Bailey Jain MD, [facility] [place]  [facility]  [address], [address]  [address]  [place], [place]  [id number] [place] [id number]  Ph: [phone]   Ph [phone]

## 2024-10-17 DIAGNOSIS — I10 PRIMARY HYPERTENSION: ICD-10-CM

## 2024-10-18 RX ORDER — LISINOPRIL 20 MG/1
20 TABLET ORAL DAILY
Qty: 90 TABLET | Refills: 0 | Status: SHIPPED | OUTPATIENT
Start: 2024-10-18

## 2024-12-26 DIAGNOSIS — I10 PRIMARY HYPERTENSION: ICD-10-CM

## 2024-12-26 RX ORDER — LISINOPRIL 20 MG/1
20 TABLET ORAL DAILY
Qty: 90 TABLET | Refills: 0 | Status: SHIPPED | OUTPATIENT
Start: 2024-12-26

## 2024-12-26 NOTE — TELEPHONE ENCOUNTER
Refill sent.  Please contact patient to schedule follow-up, next routine available with any provider.

## 2025-02-03 RX ORDER — METOPROLOL SUCCINATE 100 MG/1
100 TABLET, EXTENDED RELEASE ORAL DAILY
Qty: 90 TABLET | Refills: 1 | Status: SHIPPED | OUTPATIENT
Start: 2025-02-03

## 2025-02-12 ENCOUNTER — OFFICE VISIT (OUTPATIENT)
Facility: CLINIC | Age: 89
End: 2025-02-12
Payer: MEDICARE

## 2025-02-12 VITALS
BODY MASS INDEX: 31.08 KG/M2 | OXYGEN SATURATION: 99 % | HEART RATE: 62 BPM | DIASTOLIC BLOOD PRESSURE: 66 MMHG | HEIGHT: 71 IN | SYSTOLIC BLOOD PRESSURE: 120 MMHG | WEIGHT: 222 LBS | RESPIRATION RATE: 18 BRPM

## 2025-02-12 DIAGNOSIS — I10 PRIMARY HYPERTENSION: ICD-10-CM

## 2025-02-12 DIAGNOSIS — K21.9 GASTROESOPHAGEAL REFLUX DISEASE, UNSPECIFIED WHETHER ESOPHAGITIS PRESENT: ICD-10-CM

## 2025-02-12 DIAGNOSIS — E11.69 TYPE 2 DIABETES MELLITUS WITH OTHER SPECIFIED COMPLICATION, WITHOUT LONG-TERM CURRENT USE OF INSULIN (HCC): Primary | ICD-10-CM

## 2025-02-12 DIAGNOSIS — E78.5 DYSLIPIDEMIA: ICD-10-CM

## 2025-02-12 DIAGNOSIS — I25.118 CORONARY ARTERY DISEASE OF NATIVE ARTERY OF NATIVE HEART WITH STABLE ANGINA PECTORIS: ICD-10-CM

## 2025-02-12 PROCEDURE — G8417 CALC BMI ABV UP PARAM F/U: HCPCS | Performed by: STUDENT IN AN ORGANIZED HEALTH CARE EDUCATION/TRAINING PROGRAM

## 2025-02-12 PROCEDURE — 99214 OFFICE O/P EST MOD 30 MIN: CPT | Performed by: STUDENT IN AN ORGANIZED HEALTH CARE EDUCATION/TRAINING PROGRAM

## 2025-02-12 PROCEDURE — 1123F ACP DISCUSS/DSCN MKR DOCD: CPT | Performed by: STUDENT IN AN ORGANIZED HEALTH CARE EDUCATION/TRAINING PROGRAM

## 2025-02-12 PROCEDURE — 1036F TOBACCO NON-USER: CPT | Performed by: STUDENT IN AN ORGANIZED HEALTH CARE EDUCATION/TRAINING PROGRAM

## 2025-02-12 PROCEDURE — 1159F MED LIST DOCD IN RCRD: CPT | Performed by: STUDENT IN AN ORGANIZED HEALTH CARE EDUCATION/TRAINING PROGRAM

## 2025-02-12 PROCEDURE — 1126F AMNT PAIN NOTED NONE PRSNT: CPT | Performed by: STUDENT IN AN ORGANIZED HEALTH CARE EDUCATION/TRAINING PROGRAM

## 2025-02-12 PROCEDURE — G8427 DOCREV CUR MEDS BY ELIG CLIN: HCPCS | Performed by: STUDENT IN AN ORGANIZED HEALTH CARE EDUCATION/TRAINING PROGRAM

## 2025-02-12 RX ORDER — NITROGLYCERIN 0.4 MG/1
0.4 TABLET SUBLINGUAL AS NEEDED
Qty: 25 TABLET | Refills: 2 | Status: SHIPPED | OUTPATIENT
Start: 2025-02-12

## 2025-02-12 RX ORDER — PANTOPRAZOLE SODIUM 40 MG/1
40 TABLET, DELAYED RELEASE ORAL DAILY
Qty: 90 TABLET | Refills: 3 | Status: SHIPPED | OUTPATIENT
Start: 2025-02-12

## 2025-02-12 RX ORDER — ATORVASTATIN CALCIUM 80 MG/1
80 TABLET, FILM COATED ORAL DAILY
Qty: 90 TABLET | Refills: 1 | Status: SHIPPED | OUTPATIENT
Start: 2025-02-12

## 2025-02-12 RX ORDER — METFORMIN HYDROCHLORIDE 500 MG/1
TABLET, EXTENDED RELEASE ORAL
Qty: 270 TABLET | Refills: 1 | Status: SHIPPED | OUTPATIENT
Start: 2025-02-12

## 2025-02-12 RX ORDER — AMLODIPINE BESYLATE 10 MG/1
10 TABLET ORAL DAILY
Qty: 90 TABLET | Refills: 1 | Status: SHIPPED | OUTPATIENT
Start: 2025-02-12

## 2025-02-12 RX ORDER — METOPROLOL SUCCINATE 100 MG/1
100 TABLET, EXTENDED RELEASE ORAL DAILY
Qty: 90 TABLET | Refills: 1 | Status: SHIPPED | OUTPATIENT
Start: 2025-02-12

## 2025-02-12 RX ORDER — LISINOPRIL 20 MG/1
20 TABLET ORAL DAILY
Qty: 90 TABLET | Refills: 1 | Status: SHIPPED | OUTPATIENT
Start: 2025-02-12

## 2025-02-12 SDOH — ECONOMIC STABILITY: FOOD INSECURITY: WITHIN THE PAST 12 MONTHS, YOU WORRIED THAT YOUR FOOD WOULD RUN OUT BEFORE YOU GOT MONEY TO BUY MORE.: NEVER TRUE

## 2025-02-12 SDOH — ECONOMIC STABILITY: FOOD INSECURITY: WITHIN THE PAST 12 MONTHS, THE FOOD YOU BOUGHT JUST DIDN'T LAST AND YOU DIDN'T HAVE MONEY TO GET MORE.: NEVER TRUE

## 2025-02-12 ASSESSMENT — PATIENT HEALTH QUESTIONNAIRE - PHQ9
SUM OF ALL RESPONSES TO PHQ QUESTIONS 1-9: 0
2. FEELING DOWN, DEPRESSED OR HOPELESS: NOT AT ALL
1. LITTLE INTEREST OR PLEASURE IN DOING THINGS: NOT AT ALL
SUM OF ALL RESPONSES TO PHQ9 QUESTIONS 1 & 2: 0
SUM OF ALL RESPONSES TO PHQ QUESTIONS 1-9: 0

## 2025-02-12 NOTE — PATIENT INSTRUCTIONS
You should check your blood pressure at home.    Check first thing in the morning.    You should be relaxed, seated with back supported, feet flat on the floor, arm with cuff resting at heart height.    You should check your blood pressure 1-3 times.    Please write down your blood pressures and bring this record and your blood pressure cuff/machine to your follow-up visit.     I would like for your blood pressure to be less than 130 for the top number and less than 90 for the bottom number.   Please follow-up sooner for consistently high blood pressure readings at home.

## 2025-02-12 NOTE — PROGRESS NOTES
The patient (or guardian, if applicable) and other individuals in attendance with the patient were advised that Artificial Intelligence will be utilized during this visit to record, process the conversation to generate a clinical note, and support improvement of the AI technology. The patient (or guardian, if applicable) and other individuals in attendance at the appointment consented to the use of AI, including the recording.        Gregory Pablo is a 89 y.o. male , id x 2(name and ). Reviewed record, history, and  medications.    Chief Complaint   Patient presents with    Medication Check     He would like to go over them with you.     1. Immunizations were updated today.   2. All medications were updated today.     Health Maintenance Due   Topic    DTaP/Tdap/Td vaccine (1 - Tdap)    Prostate Specific Antigen (PSA) Screening or Monitoring     Respiratory Syncytial Virus (RSV) Pregnant or age 60 yrs+ (1 - 1-dose 75+ series)    COVID-19 Vaccine ( season)    Annual Wellness Visit (Medicare Advantage)        Wt Readings from Last 1 Encounters:   25 100.7 kg (222 lb)     BP Readings from Last 3 Encounters:   25 120/66   24 128/80   24 (!) 159/67     Pulse Readings from Last 1 Encounters:   25 62         Patient is currently accompanied by self & I have received verbal consent from Gregory Pablo to discuss any/all medical information while he/she is present in the room.    Home BP cuff present today:  no    Home medication list or bottles present today: no  - All medications were reviewed and updated with the patient today in office.    Forms for completion: no    Chest pain/SOB no    Surgery within the next month:  no      Depression Screening:  :     Depression: Not at risk (2025)    PHQ-2     PHQ-2 Score: 0          Coordination of Care Questionnaire:  :     \"Have you been to the ER, urgent care clinic since your last visit?  Hospitalized since your last

## 2025-04-15 DIAGNOSIS — E11.69 TYPE 2 DIABETES MELLITUS WITH OTHER SPECIFIED COMPLICATION, WITHOUT LONG-TERM CURRENT USE OF INSULIN: ICD-10-CM

## 2025-04-15 RX ORDER — METFORMIN HYDROCHLORIDE 500 MG/1
1000 TABLET, EXTENDED RELEASE ORAL DAILY
Qty: 180 TABLET | Refills: 1 | Status: SHIPPED | OUTPATIENT
Start: 2025-04-15

## 2025-05-02 ENCOUNTER — TELEPHONE (OUTPATIENT)
Facility: CLINIC | Age: 89
End: 2025-05-02

## 2025-05-02 DIAGNOSIS — E11.69 TYPE 2 DIABETES MELLITUS WITH OTHER SPECIFIED COMPLICATION, WITHOUT LONG-TERM CURRENT USE OF INSULIN (HCC): ICD-10-CM

## 2025-05-02 NOTE — TELEPHONE ENCOUNTER
We received a fax refill request for Gregory Pablo.  Please escribe Metformin 500mg tab to their pharmacy.  The pharmacy is correct in the chart and they are requesting a ? day supply.

## 2025-05-04 RX ORDER — METFORMIN HYDROCHLORIDE 500 MG/1
1000 TABLET, EXTENDED RELEASE ORAL DAILY
Qty: 180 TABLET | Refills: 1 | Status: SHIPPED | OUTPATIENT
Start: 2025-05-04

## 2025-07-21 ENCOUNTER — TELEPHONE (OUTPATIENT)
Facility: CLINIC | Age: 89
End: 2025-07-21

## 2025-07-21 NOTE — TELEPHONE ENCOUNTER
Pt needs his metFORMIN (GLUCOPHAGE-XR) 500 MG extended release tablet. Pt said that Samira changed the 2 to 3 times a day. He needs you to call pharmacy for 3 times a day and a 90 day supply

## 2025-07-29 ENCOUNTER — OFFICE VISIT (OUTPATIENT)
Age: 89
End: 2025-07-29
Payer: MEDICARE

## 2025-07-29 VITALS
DIASTOLIC BLOOD PRESSURE: 60 MMHG | OXYGEN SATURATION: 98 % | HEART RATE: 65 BPM | HEIGHT: 71 IN | WEIGHT: 213 LBS | SYSTOLIC BLOOD PRESSURE: 120 MMHG | BODY MASS INDEX: 29.82 KG/M2

## 2025-07-29 DIAGNOSIS — I25.118 CORONARY ARTERY DISEASE OF NATIVE ARTERY OF NATIVE HEART WITH STABLE ANGINA PECTORIS: Primary | ICD-10-CM

## 2025-07-29 DIAGNOSIS — G45.9 TIA (TRANSIENT ISCHEMIC ATTACK): ICD-10-CM

## 2025-07-29 DIAGNOSIS — I10 PRIMARY HYPERTENSION: ICD-10-CM

## 2025-07-29 DIAGNOSIS — E78.2 MIXED HYPERLIPIDEMIA: ICD-10-CM

## 2025-07-29 PROCEDURE — 93005 ELECTROCARDIOGRAM TRACING: CPT | Performed by: SPECIALIST

## 2025-07-29 PROCEDURE — 1126F AMNT PAIN NOTED NONE PRSNT: CPT | Performed by: SPECIALIST

## 2025-07-29 PROCEDURE — 1160F RVW MEDS BY RX/DR IN RCRD: CPT | Performed by: SPECIALIST

## 2025-07-29 PROCEDURE — 99214 OFFICE O/P EST MOD 30 MIN: CPT | Performed by: SPECIALIST

## 2025-07-29 PROCEDURE — 1123F ACP DISCUSS/DSCN MKR DOCD: CPT | Performed by: SPECIALIST

## 2025-07-29 PROCEDURE — 1036F TOBACCO NON-USER: CPT | Performed by: SPECIALIST

## 2025-07-29 PROCEDURE — 1159F MED LIST DOCD IN RCRD: CPT | Performed by: SPECIALIST

## 2025-07-29 PROCEDURE — G8427 DOCREV CUR MEDS BY ELIG CLIN: HCPCS | Performed by: SPECIALIST

## 2025-07-29 PROCEDURE — G8417 CALC BMI ABV UP PARAM F/U: HCPCS | Performed by: SPECIALIST

## 2025-07-29 PROCEDURE — 93010 ELECTROCARDIOGRAM REPORT: CPT | Performed by: SPECIALIST

## 2025-07-29 RX ORDER — DONEPEZIL HYDROCHLORIDE 5 MG/1
5 TABLET, FILM COATED ORAL NIGHTLY
COMMUNITY

## 2025-07-29 RX ORDER — MIRABEGRON 50 MG/1
50 TABLET, FILM COATED, EXTENDED RELEASE ORAL DAILY
COMMUNITY

## 2025-07-29 NOTE — PATIENT INSTRUCTIONS
Patient Education        Learning About the Mediterranean Diet  What is the Mediterranean diet?     The Mediterranean diet is a style of eating rather than a diet plan. It features foods eaten in [place], [place], southern [place] and [place], and other countries along the [place] Sea. It emphasizes eating foods like fish, fruits, vegetables, beans, high-fiber breads and whole grains, nuts, and olive oil. This style of eating includes limited red meat, cheese, and sweets.  Why choose the Mediterranean diet?  A Mediterranean-style diet may improve heart health. It contains more fat than other heart-healthy diets. But the fats are mainly from nuts, unsaturated oils (such as fish oils and olive oil), and certain nut or seed oils (such as canola, soybean, or flaxseed oil). These fats may help protect the heart and blood vessels.  How can you get started on the Mediterranean diet?  Here are some things you can do to switch to a more Mediterranean way of eating.  What to eat  Eat a variety of fruits and vegetables each day, such as grapes, blueberries, tomatoes, broccoli, peppers, figs, olives, spinach, eggplant, beans, lentils, and chickpeas.  Eat a variety of whole-grain foods each day, such as oats, brown rice, and whole wheat bread, pasta, and couscous.  Eat fish at least 2 times a week. Try tuna, salmon, mackerel, lake trout, herring, or sardines.  Eat moderate amounts of low-fat dairy products, such as milk, cheese, or yogurt.  Eat moderate amounts of poultry and eggs.  Choose healthy (unsaturated) fats, such as nuts, olive oil, and certain nut or seed oils like canola, soybean, and flaxseed.  Limit unhealthy (saturated) fats, such as butter, palm oil, and coconut oil. And limit fats found in animal products, such as meat and dairy products made with whole milk. Try to eat red meat only a few times a month in very small amounts.  Limit sweets and desserts to only a few times a week. This includes sugar-sweetened
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
## 2025-07-29 NOTE — PROGRESS NOTES
Chief Complaint   Patient presents with    Hypertension    Hyperlipidemia    Coronary Artery Disease     Vitals:    07/29/25 0842   BP: 120/60   BP Site: Left Upper Arm   Patient Position: Sitting   BP Cuff Size: Medium Adult   Pulse: 65   SpO2: 98%   Weight: 96.6 kg (213 lb)   Height: 1.803 m (5' 11\")       Chest pain NO     ER, urgent care, or hospitalized outside of [facility] since your last visit?  NO     Refills NO

## 2025-08-01 DIAGNOSIS — I10 PRIMARY HYPERTENSION: ICD-10-CM

## 2025-08-01 RX ORDER — AMLODIPINE BESYLATE 10 MG/1
10 TABLET ORAL DAILY
Qty: 90 TABLET | Refills: 1 | Status: SHIPPED | OUTPATIENT
Start: 2025-08-01

## 2025-08-01 RX ORDER — LISINOPRIL 20 MG/1
20 TABLET ORAL DAILY
Qty: 90 TABLET | Refills: 1 | Status: SHIPPED | OUTPATIENT
Start: 2025-08-01

## (undated) DEVICE — HEART CATH-SFMC: Brand: MEDLINE INDUSTRIES, INC.

## (undated) DEVICE — SYRINGE MED 10ML RED POLYCARB BRL FIX M LUER CONN FLAT GRP

## (undated) DEVICE — Device: Brand: OMNIWIRE PRESSURE GUIDE WIRE

## (undated) DEVICE — GUIDEWIRE VASC L260CM DIA0.035IN TIP L3MM STD EXCHG PTFE J

## (undated) DEVICE — TUBING PRSS MON L6IN PVC M FEM CONN

## (undated) DEVICE — CATH GUID COR JR4.0 6FR 100CM -- LAUNCHER

## (undated) DEVICE — GLIDESHEATH SLENDER ACCESS KIT: Brand: GLIDESHEATH SLENDER

## (undated) DEVICE — COPILOT BLEEDBACK CONTROL VALVE: Brand: COPILOT

## (undated) DEVICE — SUPPORT WRST AD W3.5XL9IN DIA14.5IN ART SFT ADJ HK AND LOOP

## (undated) DEVICE — CATHETER KIT JL4 JR4 5FR 100CM 145 PGTL DXTERITY

## (undated) DEVICE — TR BAND RADIAL ARTERY COMPRESSION DEVICE: Brand: TR BAND